# Patient Record
Sex: FEMALE | Race: WHITE | Employment: UNEMPLOYED | URBAN - METROPOLITAN AREA
[De-identification: names, ages, dates, MRNs, and addresses within clinical notes are randomized per-mention and may not be internally consistent; named-entity substitution may affect disease eponyms.]

---

## 2018-06-01 ENCOUNTER — HOSPITAL ENCOUNTER (OUTPATIENT)
Dept: LAB | Age: 68
Discharge: HOME | End: 2018-06-01
Payer: MEDICARE

## 2018-06-01 DIAGNOSIS — D75.1 POLYCYTHEMIA: ICD-10-CM

## 2018-06-01 LAB
ALBUMIN SERPL-MCNC: 4.2 G/DL (ref 3.2–4.6)
ALBUMIN/GLOB SERPL: 1.1 {RATIO} (ref 1.2–3.5)
ALP SERPL-CCNC: 89 U/L (ref 50–136)
ALT SERPL-CCNC: 17 U/L (ref 12–65)
ANION GAP SERPL CALC-SCNC: 4 MMOL/L (ref 7–16)
APPEARANCE UR: CLEAR
AST SERPL-CCNC: 12 U/L (ref 15–37)
BACTERIA URNS QL MICRO: NORMAL /HPF
BASOPHILS # BLD: 0.1 K/UL (ref 0–0.2)
BASOPHILS NFR BLD: 1 % (ref 0–2)
BILIRUB SERPL-MCNC: 0.4 MG/DL (ref 0.2–1.1)
BILIRUB UR QL: NEGATIVE
BUN SERPL-MCNC: 9 MG/DL (ref 8–23)
CALCIUM SERPL-MCNC: 9.5 MG/DL (ref 8.3–10.4)
CASTS URNS QL MICRO: 0 /LPF
CHLORIDE SERPL-SCNC: 96 MMOL/L (ref 98–107)
CO2 SERPL-SCNC: 32 MMOL/L (ref 21–32)
COLOR UR: YELLOW
CREAT SERPL-MCNC: 0.8 MG/DL (ref 0.6–1)
CRYSTALS URNS QL MICRO: 0 /LPF
DIFFERENTIAL METHOD BLD: ABNORMAL
EOSINOPHIL # BLD: 0.1 K/UL (ref 0–0.8)
EOSINOPHIL NFR BLD: 1 % (ref 0.5–7.8)
EPI CELLS #/AREA URNS HPF: NORMAL /HPF
ERYTHROCYTE [DISTWIDTH] IN BLOOD BY AUTOMATED COUNT: 13.7 % (ref 11.9–14.6)
GLOBULIN SER CALC-MCNC: 3.8 G/DL (ref 2.3–3.5)
GLUCOSE SERPL-MCNC: 102 MG/DL (ref 65–100)
GLUCOSE UR STRIP.AUTO-MCNC: NEGATIVE MG/DL
HCT VFR BLD AUTO: 50.2 % (ref 35.8–46.3)
HGB BLD-MCNC: 16.9 G/DL (ref 11.7–15.4)
HGB UR QL STRIP: ABNORMAL
KETONES UR QL STRIP.AUTO: NEGATIVE MG/DL
LEUKOCYTE ESTERASE UR QL STRIP.AUTO: NEGATIVE
LYMPHOCYTES # BLD: 2 K/UL (ref 0.5–4.6)
LYMPHOCYTES NFR BLD: 26 % (ref 13–44)
Lab: NORMAL
MCH RBC QN AUTO: 29.9 PG (ref 26.1–32.9)
MCHC RBC AUTO-ENTMCNC: 33.7 G/DL (ref 31.4–35)
MCV RBC AUTO: 88.7 FL (ref 79.6–97.8)
MONOCYTES # BLD: 0.8 K/UL (ref 0.1–1.3)
MONOCYTES NFR BLD: 10 % (ref 4–12)
MUCOUS THREADS URNS QL MICRO: 0 /LPF
NEUTS SEG # BLD: 4.8 K/UL (ref 1.7–8.2)
NEUTS SEG NFR BLD: 63 % (ref 43–78)
NITRITE UR QL STRIP.AUTO: NEGATIVE
NRBC # BLD: 0 K/UL (ref 0–0.2)
NRBC BLD-RTO: 0 PER 100 WBC (ref 0–2)
PH UR STRIP: 6 [PH] (ref 5–9)
PLATELET # BLD AUTO: 282 K/UL (ref 150–450)
PMV BLD AUTO: 8.8 FL (ref 10.8–14.1)
POTASSIUM SERPL-SCNC: 3.4 MMOL/L (ref 3.5–5.1)
PROT SERPL-MCNC: 8 G/DL (ref 6.3–8.2)
PROT UR STRIP-MCNC: NEGATIVE MG/DL
RBC # BLD AUTO: 5.66 M/UL (ref 4.05–5.25)
RBC #/AREA URNS HPF: NORMAL /HPF
REFERENCE LAB,REFLB: NORMAL
SODIUM SERPL-SCNC: 132 MMOL/L (ref 136–145)
SP GR UR REFRACTOMETRY: 1.01 (ref 1–1.02)
TEST DESCRIPTION:,ATST: NORMAL
UROBILINOGEN UR QL STRIP.AUTO: 0.2 EU/DL (ref 0.2–1)
WBC # BLD AUTO: 7.6 K/UL (ref 4.3–11.1)
WBC URNS QL MICRO: NORMAL /HPF

## 2018-06-01 PROCEDURE — 82375 ASSAY CARBOXYHB QUANT: CPT | Performed by: INTERNAL MEDICINE

## 2018-06-01 PROCEDURE — 81003 URINALYSIS AUTO W/O SCOPE: CPT | Performed by: INTERNAL MEDICINE

## 2018-06-01 PROCEDURE — 82668 ASSAY OF ERYTHROPOIETIN: CPT | Performed by: INTERNAL MEDICINE

## 2018-06-01 PROCEDURE — 80053 COMPREHEN METABOLIC PANEL: CPT | Performed by: INTERNAL MEDICINE

## 2018-06-01 PROCEDURE — 85025 COMPLETE CBC W/AUTO DIFF WBC: CPT | Performed by: INTERNAL MEDICINE

## 2018-06-01 PROCEDURE — 36415 COLL VENOUS BLD VENIPUNCTURE: CPT | Performed by: INTERNAL MEDICINE

## 2018-06-01 PROCEDURE — 81015 MICROSCOPIC EXAM OF URINE: CPT | Performed by: INTERNAL MEDICINE

## 2018-06-02 LAB — EPO SERPL-ACNC: 4.3 MIU/ML (ref 2.6–18.5)

## 2018-06-04 LAB — COHGB MFR BLD: 10.5 % (ref 0–3.6)

## 2018-06-06 LAB — PATH REV BLD -IMP: NORMAL

## 2018-06-10 PROBLEM — I10 ESSENTIAL HYPERTENSION: Status: ACTIVE | Noted: 2018-06-10

## 2018-06-10 PROBLEM — R53.82 CHRONIC FATIGUE: Status: ACTIVE | Noted: 2018-06-10

## 2018-06-10 PROBLEM — F17.200 CURRENT SMOKER: Status: ACTIVE | Noted: 2018-06-10

## 2018-06-10 PROBLEM — K21.9 GERD (GASTROESOPHAGEAL REFLUX DISEASE): Status: ACTIVE | Noted: 2018-06-10

## 2018-06-10 PROBLEM — J44.9 COPD (CHRONIC OBSTRUCTIVE PULMONARY DISEASE) WITH CHRONIC BRONCHITIS (HCC): Status: ACTIVE | Noted: 2018-06-10

## 2018-06-10 PROBLEM — D75.1 POLYCYTHEMIA: Status: ACTIVE | Noted: 2018-06-10

## 2018-06-10 PROBLEM — F32.9 MAJOR DEPRESSIVE DISORDER: Status: ACTIVE | Noted: 2018-06-10

## 2018-06-10 PROBLEM — E66.09 CLASS 1 OBESITY DUE TO EXCESS CALORIES IN ADULT: Status: ACTIVE | Noted: 2018-06-10

## 2018-06-10 PROBLEM — Z80.3 FAMILY HISTORY OF BREAST CANCER: Status: ACTIVE | Noted: 2018-06-10

## 2018-07-02 ENCOUNTER — HOSPITAL ENCOUNTER (OUTPATIENT)
Dept: LAB | Age: 68
Discharge: HOME | End: 2018-07-02
Payer: MEDICARE

## 2018-07-02 DIAGNOSIS — D75.1 POLYCYTHEMIA: ICD-10-CM

## 2018-07-02 LAB
ALBUMIN SERPL-MCNC: 3.9 G/DL (ref 3.2–4.6)
ALBUMIN/GLOB SERPL: 1.1 {RATIO} (ref 1.2–3.5)
ALP SERPL-CCNC: 71 U/L (ref 50–136)
ALT SERPL-CCNC: 16 U/L (ref 12–65)
ANION GAP SERPL CALC-SCNC: 6 MMOL/L (ref 7–16)
AST SERPL-CCNC: 13 U/L (ref 15–37)
BASOPHILS # BLD: 0 K/UL (ref 0–0.2)
BASOPHILS NFR BLD: 1 % (ref 0–2)
BILIRUB SERPL-MCNC: 0.3 MG/DL (ref 0.2–1.1)
BUN SERPL-MCNC: 8 MG/DL (ref 8–23)
CALCIUM SERPL-MCNC: 9.4 MG/DL (ref 8.3–10.4)
CHLORIDE SERPL-SCNC: 103 MMOL/L (ref 98–107)
CO2 SERPL-SCNC: 27 MMOL/L (ref 21–32)
CREAT SERPL-MCNC: 0.8 MG/DL (ref 0.6–1)
DIFFERENTIAL METHOD BLD: ABNORMAL
EOSINOPHIL # BLD: 0.1 K/UL (ref 0–0.8)
EOSINOPHIL NFR BLD: 1 % (ref 0.5–7.8)
ERYTHROCYTE [DISTWIDTH] IN BLOOD BY AUTOMATED COUNT: 13.3 % (ref 11.9–14.6)
GLOBULIN SER CALC-MCNC: 3.4 G/DL (ref 2.3–3.5)
GLUCOSE SERPL-MCNC: 95 MG/DL (ref 65–100)
HCT VFR BLD AUTO: 47 % (ref 35.8–46.3)
HGB BLD-MCNC: 15.9 G/DL (ref 11.7–15.4)
LYMPHOCYTES # BLD: 1.7 K/UL (ref 0.5–4.6)
LYMPHOCYTES NFR BLD: 27 % (ref 13–44)
MCH RBC QN AUTO: 29.9 PG (ref 26.1–32.9)
MCHC RBC AUTO-ENTMCNC: 33.8 G/DL (ref 31.4–35)
MCV RBC AUTO: 88.3 FL (ref 79.6–97.8)
MONOCYTES # BLD: 0.8 K/UL (ref 0.1–1.3)
MONOCYTES NFR BLD: 13 % (ref 4–12)
NEUTS SEG # BLD: 3.7 K/UL (ref 1.7–8.2)
NEUTS SEG NFR BLD: 58 % (ref 43–78)
NRBC # BLD: 0 K/UL (ref 0–0.2)
PLATELET # BLD AUTO: 270 K/UL (ref 150–450)
PMV BLD AUTO: 8.7 FL (ref 10.8–14.1)
POTASSIUM SERPL-SCNC: 4.1 MMOL/L (ref 3.5–5.1)
PROT SERPL-MCNC: 7.3 G/DL (ref 6.3–8.2)
RBC # BLD AUTO: 5.32 M/UL (ref 4.05–5.25)
SODIUM SERPL-SCNC: 136 MMOL/L (ref 136–145)
WBC # BLD AUTO: 6.3 K/UL (ref 4.3–11.1)

## 2018-07-02 PROCEDURE — 36415 COLL VENOUS BLD VENIPUNCTURE: CPT | Performed by: INTERNAL MEDICINE

## 2018-07-02 PROCEDURE — 80053 COMPREHEN METABOLIC PANEL: CPT | Performed by: INTERNAL MEDICINE

## 2018-07-02 PROCEDURE — 85025 COMPLETE CBC W/AUTO DIFF WBC: CPT | Performed by: INTERNAL MEDICINE

## 2018-08-06 ENCOUNTER — HOSPITAL ENCOUNTER (OUTPATIENT)
Dept: LAB | Age: 68
Discharge: HOME | End: 2018-08-06
Payer: MEDICARE

## 2018-08-06 DIAGNOSIS — D75.1 POLYCYTHEMIA: ICD-10-CM

## 2018-08-06 LAB
ALBUMIN SERPL-MCNC: 3.9 G/DL (ref 3.2–4.6)
ALBUMIN/GLOB SERPL: 1.2 {RATIO} (ref 1.2–3.5)
ALP SERPL-CCNC: 74 U/L (ref 50–136)
ALT SERPL-CCNC: 18 U/L (ref 12–65)
ANION GAP SERPL CALC-SCNC: 6 MMOL/L (ref 7–16)
AST SERPL-CCNC: 9 U/L (ref 15–37)
BASOPHILS # BLD: 0 K/UL (ref 0–0.2)
BASOPHILS NFR BLD: 0 % (ref 0–2)
BILIRUB SERPL-MCNC: 0.7 MG/DL (ref 0.2–1.1)
BUN SERPL-MCNC: 10 MG/DL (ref 8–23)
CALCIUM SERPL-MCNC: 9.1 MG/DL (ref 8.3–10.4)
CHLORIDE SERPL-SCNC: 101 MMOL/L (ref 98–107)
CO2 SERPL-SCNC: 28 MMOL/L (ref 21–32)
CREAT SERPL-MCNC: 0.86 MG/DL (ref 0.6–1)
DIFFERENTIAL METHOD BLD: ABNORMAL
EOSINOPHIL # BLD: 0.1 K/UL (ref 0–0.8)
EOSINOPHIL NFR BLD: 1 % (ref 0.5–7.8)
ERYTHROCYTE [DISTWIDTH] IN BLOOD BY AUTOMATED COUNT: 13.6 % (ref 11.9–14.6)
GLOBULIN SER CALC-MCNC: 3.3 G/DL (ref 2.3–3.5)
GLUCOSE SERPL-MCNC: 91 MG/DL (ref 65–100)
HCT VFR BLD AUTO: 46.8 % (ref 35.8–46.3)
HGB BLD-MCNC: 15.5 G/DL (ref 11.7–15.4)
LYMPHOCYTES # BLD: 1.8 K/UL (ref 0.5–4.6)
LYMPHOCYTES NFR BLD: 27 % (ref 13–44)
MCH RBC QN AUTO: 29 PG (ref 26.1–32.9)
MCHC RBC AUTO-ENTMCNC: 33.1 G/DL (ref 31.4–35)
MCV RBC AUTO: 87.6 FL (ref 79.6–97.8)
MONOCYTES # BLD: 0.9 K/UL (ref 0.1–1.3)
MONOCYTES NFR BLD: 13 % (ref 4–12)
NEUTS SEG # BLD: 4.1 K/UL (ref 1.7–8.2)
NEUTS SEG NFR BLD: 59 % (ref 43–78)
NRBC # BLD: 0 K/UL (ref 0–0.2)
PLATELET # BLD AUTO: 278 K/UL (ref 150–450)
PMV BLD AUTO: 8.8 FL (ref 9.4–12.3)
POTASSIUM SERPL-SCNC: 4.2 MMOL/L (ref 3.5–5.1)
PROT SERPL-MCNC: 7.2 G/DL (ref 6.3–8.2)
RBC # BLD AUTO: 5.34 M/UL (ref 4.05–5.25)
SODIUM SERPL-SCNC: 135 MMOL/L (ref 136–145)
WBC # BLD AUTO: 6.9 K/UL (ref 4.3–11.1)

## 2018-08-06 PROCEDURE — 80053 COMPREHEN METABOLIC PANEL: CPT

## 2018-08-06 PROCEDURE — 36415 COLL VENOUS BLD VENIPUNCTURE: CPT

## 2018-08-06 PROCEDURE — 85025 COMPLETE CBC W/AUTO DIFF WBC: CPT

## 2018-09-10 ENCOUNTER — HOSPITAL ENCOUNTER (OUTPATIENT)
Dept: LAB | Age: 68
Discharge: HOME | End: 2018-09-10
Payer: MEDICARE

## 2018-09-10 DIAGNOSIS — D75.1 POLYCYTHEMIA: ICD-10-CM

## 2018-09-10 LAB
ALBUMIN SERPL-MCNC: 3.8 G/DL (ref 3.2–4.6)
ALBUMIN/GLOB SERPL: 1 {RATIO} (ref 1.2–3.5)
ALP SERPL-CCNC: 77 U/L (ref 50–136)
ALT SERPL-CCNC: 18 U/L (ref 12–65)
ANION GAP SERPL CALC-SCNC: 5 MMOL/L (ref 7–16)
AST SERPL-CCNC: 13 U/L (ref 15–37)
BASOPHILS # BLD: 0 K/UL (ref 0–0.2)
BASOPHILS NFR BLD: 1 % (ref 0–2)
BILIRUB SERPL-MCNC: 0.4 MG/DL (ref 0.2–1.1)
BUN SERPL-MCNC: 11 MG/DL (ref 8–23)
CALCIUM SERPL-MCNC: 9.4 MG/DL (ref 8.3–10.4)
CHLORIDE SERPL-SCNC: 102 MMOL/L (ref 98–107)
CO2 SERPL-SCNC: 31 MMOL/L (ref 21–32)
CREAT SERPL-MCNC: 0.78 MG/DL (ref 0.6–1)
DIFFERENTIAL METHOD BLD: ABNORMAL
EOSINOPHIL # BLD: 0.1 K/UL (ref 0–0.8)
EOSINOPHIL NFR BLD: 1 % (ref 0.5–7.8)
ERYTHROCYTE [DISTWIDTH] IN BLOOD BY AUTOMATED COUNT: 13.8 % (ref 11.9–14.6)
GLOBULIN SER CALC-MCNC: 3.9 G/DL (ref 2.3–3.5)
GLUCOSE SERPL-MCNC: 81 MG/DL (ref 65–100)
HCT VFR BLD AUTO: 48.9 % (ref 35.8–46.3)
HGB BLD-MCNC: 15.9 G/DL (ref 11.7–15.4)
IMM GRANULOCYTES # BLD: 0 K/UL (ref 0–0.5)
IMM GRANULOCYTES NFR BLD AUTO: 0 % (ref 0–5)
LYMPHOCYTES # BLD: 1.9 K/UL (ref 0.5–4.6)
LYMPHOCYTES NFR BLD: 27 % (ref 13–44)
MCH RBC QN AUTO: 28.8 PG (ref 26.1–32.9)
MCHC RBC AUTO-ENTMCNC: 32.5 G/DL (ref 31.4–35)
MCV RBC AUTO: 88.6 FL (ref 79.6–97.8)
MONOCYTES # BLD: 0.9 K/UL (ref 0.1–1.3)
MONOCYTES NFR BLD: 12 % (ref 4–12)
NEUTS SEG # BLD: 4.2 K/UL (ref 1.7–8.2)
NEUTS SEG NFR BLD: 59 % (ref 43–78)
NRBC # BLD: 0 K/UL (ref 0–0.2)
PLATELET # BLD AUTO: 309 K/UL (ref 150–450)
PMV BLD AUTO: 8.7 FL (ref 9.4–12.3)
POTASSIUM SERPL-SCNC: 4.1 MMOL/L (ref 3.5–5.1)
PROT SERPL-MCNC: 7.7 G/DL (ref 6.3–8.2)
RBC # BLD AUTO: 5.52 M/UL (ref 4.05–5.25)
SODIUM SERPL-SCNC: 138 MMOL/L (ref 136–145)
WBC # BLD AUTO: 7.2 K/UL (ref 4.3–11.1)

## 2018-09-10 PROCEDURE — 85025 COMPLETE CBC W/AUTO DIFF WBC: CPT

## 2018-09-10 PROCEDURE — 80053 COMPREHEN METABOLIC PANEL: CPT

## 2018-09-10 PROCEDURE — 36415 COLL VENOUS BLD VENIPUNCTURE: CPT

## 2018-11-26 ENCOUNTER — HOSPITAL ENCOUNTER (OUTPATIENT)
Dept: LAB | Age: 68
Discharge: HOME | End: 2018-11-26
Payer: MEDICARE

## 2018-11-26 DIAGNOSIS — D58.2 ELEVATED HEMOGLOBIN (HCC): ICD-10-CM

## 2018-11-26 LAB
ALBUMIN SERPL-MCNC: 4 G/DL (ref 3.2–4.6)
ALBUMIN/GLOB SERPL: 1 {RATIO} (ref 1.2–3.5)
ALP SERPL-CCNC: 76 U/L (ref 50–136)
ALT SERPL-CCNC: 23 U/L (ref 12–65)
ANION GAP SERPL CALC-SCNC: 3 MMOL/L (ref 7–16)
AST SERPL-CCNC: 17 U/L (ref 15–37)
BASOPHILS # BLD: 0 K/UL (ref 0–0.2)
BASOPHILS NFR BLD: 1 % (ref 0–2)
BILIRUB SERPL-MCNC: 0.3 MG/DL (ref 0.2–1.1)
BUN SERPL-MCNC: 11 MG/DL (ref 8–23)
CALCIUM SERPL-MCNC: 9.5 MG/DL (ref 8.3–10.4)
CHLORIDE SERPL-SCNC: 102 MMOL/L (ref 98–107)
CO2 SERPL-SCNC: 32 MMOL/L (ref 21–32)
CREAT SERPL-MCNC: 0.91 MG/DL (ref 0.6–1)
DIFFERENTIAL METHOD BLD: ABNORMAL
EOSINOPHIL # BLD: 0.1 K/UL (ref 0–0.8)
EOSINOPHIL NFR BLD: 2 % (ref 0.5–7.8)
ERYTHROCYTE [DISTWIDTH] IN BLOOD BY AUTOMATED COUNT: 13.4 % (ref 11.9–14.6)
GLOBULIN SER CALC-MCNC: 3.9 G/DL (ref 2.3–3.5)
GLUCOSE SERPL-MCNC: 98 MG/DL (ref 65–100)
HCT VFR BLD AUTO: 49.4 % (ref 35.8–46.3)
HGB BLD-MCNC: 15.8 G/DL (ref 11.7–15.4)
IMM GRANULOCYTES # BLD: 0 K/UL (ref 0–0.5)
IMM GRANULOCYTES NFR BLD AUTO: 0 % (ref 0–5)
LYMPHOCYTES # BLD: 2 K/UL (ref 0.5–4.6)
LYMPHOCYTES NFR BLD: 32 % (ref 13–44)
MCH RBC QN AUTO: 28.8 PG (ref 26.1–32.9)
MCHC RBC AUTO-ENTMCNC: 32 G/DL (ref 31.4–35)
MCV RBC AUTO: 90 FL (ref 79.6–97.8)
MONOCYTES # BLD: 0.7 K/UL (ref 0.1–1.3)
MONOCYTES NFR BLD: 11 % (ref 4–12)
NEUTS SEG # BLD: 3.4 K/UL (ref 1.7–8.2)
NEUTS SEG NFR BLD: 55 % (ref 43–78)
NRBC # BLD: 0 K/UL (ref 0–0.2)
PLATELET # BLD AUTO: 303 K/UL (ref 150–450)
PMV BLD AUTO: 8.7 FL (ref 9.4–12.3)
POTASSIUM SERPL-SCNC: 4.5 MMOL/L (ref 3.5–5.1)
PROT SERPL-MCNC: 7.9 G/DL (ref 6.3–8.2)
RBC # BLD AUTO: 5.49 M/UL (ref 4.05–5.25)
SODIUM SERPL-SCNC: 137 MMOL/L (ref 136–145)
WBC # BLD AUTO: 6.3 K/UL (ref 4.3–11.1)

## 2018-11-26 PROCEDURE — 36415 COLL VENOUS BLD VENIPUNCTURE: CPT

## 2018-11-26 PROCEDURE — 80053 COMPREHEN METABOLIC PANEL: CPT

## 2018-11-26 PROCEDURE — 85025 COMPLETE CBC W/AUTO DIFF WBC: CPT

## 2019-03-04 ENCOUNTER — HOSPITAL ENCOUNTER (OUTPATIENT)
Dept: LAB | Age: 69
Discharge: HOME | End: 2019-03-04
Payer: MEDICARE

## 2019-03-04 DIAGNOSIS — D75.1 POLYCYTHEMIA: ICD-10-CM

## 2019-03-04 LAB
ALBUMIN SERPL-MCNC: 3.8 G/DL (ref 3.2–4.6)
ALBUMIN/GLOB SERPL: 1 {RATIO} (ref 1.2–3.5)
ALP SERPL-CCNC: 80 U/L (ref 50–136)
ALT SERPL-CCNC: 20 U/L (ref 12–65)
ANION GAP SERPL CALC-SCNC: 2 MMOL/L (ref 7–16)
AST SERPL-CCNC: 12 U/L (ref 15–37)
BASOPHILS # BLD: 0 K/UL (ref 0–0.2)
BASOPHILS NFR BLD: 1 % (ref 0–2)
BILIRUB SERPL-MCNC: 0.3 MG/DL (ref 0.2–1.1)
BUN SERPL-MCNC: 13 MG/DL (ref 8–23)
CALCIUM SERPL-MCNC: 9.1 MG/DL (ref 8.3–10.4)
CHLORIDE SERPL-SCNC: 102 MMOL/L (ref 98–107)
CO2 SERPL-SCNC: 33 MMOL/L (ref 21–32)
CREAT SERPL-MCNC: 1 MG/DL (ref 0.6–1)
DIFFERENTIAL METHOD BLD: ABNORMAL
EOSINOPHIL # BLD: 0.1 K/UL (ref 0–0.8)
EOSINOPHIL NFR BLD: 2 % (ref 0.5–7.8)
ERYTHROCYTE [DISTWIDTH] IN BLOOD BY AUTOMATED COUNT: 13.3 % (ref 11.9–14.6)
GLOBULIN SER CALC-MCNC: 4 G/DL (ref 2.3–3.5)
GLUCOSE SERPL-MCNC: 94 MG/DL (ref 65–100)
HCT VFR BLD AUTO: 45.2 % (ref 35.8–46.3)
HGB BLD-MCNC: 14.9 G/DL (ref 11.7–15.4)
IMM GRANULOCYTES # BLD AUTO: 0.1 K/UL (ref 0–0.5)
IMM GRANULOCYTES NFR BLD AUTO: 1 % (ref 0–5)
LYMPHOCYTES # BLD: 1.8 K/UL (ref 0.5–4.6)
LYMPHOCYTES NFR BLD: 23 % (ref 13–44)
MCH RBC QN AUTO: 29.2 PG (ref 26.1–32.9)
MCHC RBC AUTO-ENTMCNC: 33 G/DL (ref 31.4–35)
MCV RBC AUTO: 88.5 FL (ref 79.6–97.8)
MONOCYTES # BLD: 0.9 K/UL (ref 0.1–1.3)
MONOCYTES NFR BLD: 12 % (ref 4–12)
NEUTS SEG # BLD: 4.6 K/UL (ref 1.7–8.2)
NEUTS SEG NFR BLD: 62 % (ref 43–78)
NRBC # BLD: 0 K/UL (ref 0–0.2)
PLATELET # BLD AUTO: 295 K/UL (ref 150–450)
PMV BLD AUTO: 9 FL (ref 9.4–12.3)
POTASSIUM SERPL-SCNC: 3.8 MMOL/L (ref 3.5–5.1)
PROT SERPL-MCNC: 7.8 G/DL (ref 6.3–8.2)
RBC # BLD AUTO: 5.11 M/UL (ref 4.05–5.25)
SODIUM SERPL-SCNC: 137 MMOL/L (ref 136–145)
WBC # BLD AUTO: 7.5 K/UL (ref 4.3–11.1)

## 2019-03-04 PROCEDURE — 80053 COMPREHEN METABOLIC PANEL: CPT

## 2019-03-04 PROCEDURE — 36415 COLL VENOUS BLD VENIPUNCTURE: CPT

## 2019-03-04 PROCEDURE — 85025 COMPLETE CBC W/AUTO DIFF WBC: CPT

## 2019-04-09 ENCOUNTER — HOSPITAL ENCOUNTER (OUTPATIENT)
Dept: ULTRASOUND IMAGING | Age: 69
Discharge: HOME | End: 2019-04-09
Attending: NURSE PRACTITIONER
Payer: MEDICARE

## 2019-04-09 DIAGNOSIS — R10.9 LEFT SIDED ABDOMINAL PAIN OF UNKNOWN CAUSE: ICD-10-CM

## 2019-04-09 PROCEDURE — 76705 ECHO EXAM OF ABDOMEN: CPT

## 2019-05-28 ENCOUNTER — HOSPITAL ENCOUNTER (OUTPATIENT)
Dept: LAB | Age: 69
Discharge: HOME | End: 2019-05-28

## 2019-05-28 PROCEDURE — 88312 SPECIAL STAINS GROUP 1: CPT

## 2019-05-28 PROCEDURE — 88305 TISSUE EXAM BY PATHOLOGIST: CPT

## 2019-06-30 ENCOUNTER — HOSPITAL ENCOUNTER (INPATIENT)
Age: 69
LOS: 3 days | Discharge: HOME | DRG: 189 | End: 2019-07-03
Attending: EMERGENCY MEDICINE | Admitting: INTERNAL MEDICINE
Payer: MEDICARE

## 2019-06-30 ENCOUNTER — APPOINTMENT (OUTPATIENT)
Dept: GENERAL RADIOLOGY | Age: 69
DRG: 189 | End: 2019-06-30
Attending: EMERGENCY MEDICINE
Payer: MEDICARE

## 2019-06-30 DIAGNOSIS — J96.01 ACUTE RESPIRATORY FAILURE WITH HYPOXIA (HCC): ICD-10-CM

## 2019-06-30 DIAGNOSIS — D75.1 POLYCYTHEMIA: ICD-10-CM

## 2019-06-30 DIAGNOSIS — E66.9 OBESITY, UNSPECIFIED CLASSIFICATION, UNSPECIFIED OBESITY TYPE, UNSPECIFIED WHETHER SERIOUS COMORBIDITY PRESENT: Chronic | ICD-10-CM

## 2019-06-30 DIAGNOSIS — J44.1 COPD EXACERBATION (HCC): ICD-10-CM

## 2019-06-30 DIAGNOSIS — Z72.0 TOBACCO USE: Chronic | ICD-10-CM

## 2019-06-30 LAB
ALBUMIN SERPL-MCNC: 3.6 G/DL (ref 3.2–4.6)
ALBUMIN/GLOB SERPL: 1 {RATIO} (ref 1.2–3.5)
ALP SERPL-CCNC: 64 U/L (ref 50–136)
ALT SERPL-CCNC: 20 U/L (ref 12–65)
ANION GAP SERPL CALC-SCNC: 6 MMOL/L (ref 7–16)
ARTERIAL PATENCY WRIST A: YES
AST SERPL-CCNC: 14 U/L (ref 15–37)
ATRIAL RATE: 83 BPM
BASE EXCESS BLD CALC-SCNC: 0 MMOL/L
BASOPHILS # BLD: 0 K/UL (ref 0–0.2)
BASOPHILS NFR BLD: 0 % (ref 0–2)
BDY SITE: ABNORMAL
BILIRUB SERPL-MCNC: 0.4 MG/DL (ref 0.2–1.1)
BODY TEMPERATURE: 98.6
BUN SERPL-MCNC: 6 MG/DL (ref 8–23)
CALCIUM SERPL-MCNC: 9.1 MG/DL (ref 8.3–10.4)
CALCULATED P AXIS, ECG09: 84 DEGREES
CALCULATED R AXIS, ECG10: 89 DEGREES
CALCULATED T AXIS, ECG11: 70 DEGREES
CHLORIDE SERPL-SCNC: 102 MMOL/L (ref 98–107)
CO2 BLD-SCNC: 28 MMOL/L
CO2 SERPL-SCNC: 30 MMOL/L (ref 21–32)
COLLECT TIME,HTIME: 1132
CREAT SERPL-MCNC: 0.71 MG/DL (ref 0.6–1)
DIAGNOSIS, 93000: NORMAL
DIFFERENTIAL METHOD BLD: ABNORMAL
EOSINOPHIL # BLD: 0 K/UL (ref 0–0.8)
EOSINOPHIL NFR BLD: 0 % (ref 0.5–7.8)
ERYTHROCYTE [DISTWIDTH] IN BLOOD BY AUTOMATED COUNT: 13.2 % (ref 11.9–14.6)
FLOW RATE ISTAT,IFRATE: 4 L/MIN
GAS FLOW.O2 O2 DELIVERY SYS: ABNORMAL L/MIN
GLOBULIN SER CALC-MCNC: 3.6 G/DL (ref 2.3–3.5)
GLUCOSE SERPL-MCNC: 104 MG/DL (ref 65–100)
HCO3 BLD-SCNC: 26.9 MMOL/L (ref 22–26)
HCT VFR BLD AUTO: 45 % (ref 35.8–46.3)
HGB BLD-MCNC: 14.5 G/DL (ref 11.7–15.4)
IMM GRANULOCYTES # BLD AUTO: 0 K/UL (ref 0–0.5)
IMM GRANULOCYTES NFR BLD AUTO: 0 % (ref 0–5)
LIPASE SERPL-CCNC: 139 U/L (ref 73–393)
LYMPHOCYTES # BLD: 1.1 K/UL (ref 0.5–4.6)
LYMPHOCYTES NFR BLD: 18 % (ref 13–44)
MCH RBC QN AUTO: 28.9 PG (ref 26.1–32.9)
MCHC RBC AUTO-ENTMCNC: 32.2 G/DL (ref 31.4–35)
MCV RBC AUTO: 89.6 FL (ref 79.6–97.8)
MONOCYTES # BLD: 0.7 K/UL (ref 0.1–1.3)
MONOCYTES NFR BLD: 11 % (ref 4–12)
NEUTS SEG # BLD: 4.4 K/UL (ref 1.7–8.2)
NEUTS SEG NFR BLD: 71 % (ref 43–78)
NRBC # BLD: 0 K/UL (ref 0–0.2)
P-R INTERVAL, ECG05: 164 MS
PCO2 BLD: 48.7 MMHG (ref 35–45)
PH BLD: 7.35 [PH] (ref 7.35–7.45)
PLATELET # BLD AUTO: 246 K/UL (ref 150–450)
PMV BLD AUTO: 9.1 FL (ref 9.4–12.3)
PO2 BLD: 100 MMHG (ref 75–100)
POTASSIUM SERPL-SCNC: 4.2 MMOL/L (ref 3.5–5.1)
PROT SERPL-MCNC: 7.2 G/DL (ref 6.3–8.2)
Q-T INTERVAL, ECG07: 352 MS
QRS DURATION, ECG06: 60 MS
QTC CALCULATION (BEZET), ECG08: 413 MS
RBC # BLD AUTO: 5.02 M/UL (ref 4.05–5.2)
SAO2 % BLD: 97 % (ref 95–98)
SERVICE CMNT-IMP: ABNORMAL
SODIUM SERPL-SCNC: 138 MMOL/L (ref 136–145)
SPECIMEN TYPE: ABNORMAL
VENTRICULAR RATE, ECG03: 83 BPM
WBC # BLD AUTO: 6.2 K/UL (ref 4.3–11.1)

## 2019-06-30 PROCEDURE — 74011250636 HC RX REV CODE- 250/636: Performed by: INTERNAL MEDICINE

## 2019-06-30 PROCEDURE — 71046 X-RAY EXAM CHEST 2 VIEWS: CPT

## 2019-06-30 PROCEDURE — 36600 WITHDRAWAL OF ARTERIAL BLOOD: CPT

## 2019-06-30 PROCEDURE — 74011250636 HC RX REV CODE- 250/636: Performed by: EMERGENCY MEDICINE

## 2019-06-30 PROCEDURE — 77010033678 HC OXYGEN DAILY

## 2019-06-30 PROCEDURE — 96375 TX/PRO/DX INJ NEW DRUG ADDON: CPT | Performed by: EMERGENCY MEDICINE

## 2019-06-30 PROCEDURE — 94760 N-INVAS EAR/PLS OXIMETRY 1: CPT

## 2019-06-30 PROCEDURE — 96374 THER/PROPH/DIAG INJ IV PUSH: CPT | Performed by: EMERGENCY MEDICINE

## 2019-06-30 PROCEDURE — 77030013140 HC MSK NEB VYRM -A

## 2019-06-30 PROCEDURE — 99285 EMERGENCY DEPT VISIT HI MDM: CPT | Performed by: EMERGENCY MEDICINE

## 2019-06-30 PROCEDURE — 74011250637 HC RX REV CODE- 250/637: Performed by: INTERNAL MEDICINE

## 2019-06-30 PROCEDURE — 80053 COMPREHEN METABOLIC PANEL: CPT

## 2019-06-30 PROCEDURE — 65270000029 HC RM PRIVATE

## 2019-06-30 PROCEDURE — 82803 BLOOD GASES ANY COMBINATION: CPT

## 2019-06-30 PROCEDURE — 83690 ASSAY OF LIPASE: CPT

## 2019-06-30 PROCEDURE — 94640 AIRWAY INHALATION TREATMENT: CPT

## 2019-06-30 PROCEDURE — 93005 ELECTROCARDIOGRAM TRACING: CPT | Performed by: EMERGENCY MEDICINE

## 2019-06-30 PROCEDURE — 85025 COMPLETE CBC W/AUTO DIFF WBC: CPT

## 2019-06-30 PROCEDURE — 74011000250 HC RX REV CODE- 250: Performed by: INTERNAL MEDICINE

## 2019-06-30 PROCEDURE — 74011000250 HC RX REV CODE- 250: Performed by: EMERGENCY MEDICINE

## 2019-06-30 PROCEDURE — 94644 CONT INHLJ TX 1ST HOUR: CPT

## 2019-06-30 RX ORDER — LAMOTRIGINE 100 MG/1
100 TABLET ORAL 2 TIMES DAILY
Status: DISCONTINUED | OUTPATIENT
Start: 2019-06-30 | End: 2019-06-30

## 2019-06-30 RX ORDER — CITALOPRAM 20 MG/1
10 TABLET, FILM COATED ORAL DAILY
Status: DISCONTINUED | OUTPATIENT
Start: 2019-06-30 | End: 2019-07-01

## 2019-06-30 RX ORDER — HEPARIN SODIUM 5000 [USP'U]/ML
5000 INJECTION, SOLUTION INTRAVENOUS; SUBCUTANEOUS EVERY 8 HOURS
Status: DISCONTINUED | OUTPATIENT
Start: 2019-06-30 | End: 2019-07-03 | Stop reason: HOSPADM

## 2019-06-30 RX ORDER — ACETAMINOPHEN 325 MG/1
650 TABLET ORAL
Status: DISCONTINUED | OUTPATIENT
Start: 2019-06-30 | End: 2019-07-03 | Stop reason: HOSPADM

## 2019-06-30 RX ORDER — BUDESONIDE 0.5 MG/2ML
500 INHALANT ORAL
Status: DISCONTINUED | OUTPATIENT
Start: 2019-06-30 | End: 2019-07-03 | Stop reason: HOSPADM

## 2019-06-30 RX ORDER — ALBUTEROL SULFATE 0.83 MG/ML
10 SOLUTION RESPIRATORY (INHALATION)
Status: COMPLETED | OUTPATIENT
Start: 2019-06-30 | End: 2019-06-30

## 2019-06-30 RX ORDER — FAMOTIDINE 20 MG/1
20 TABLET, FILM COATED ORAL 2 TIMES DAILY
Status: DISCONTINUED | OUTPATIENT
Start: 2019-06-30 | End: 2019-07-03 | Stop reason: HOSPADM

## 2019-06-30 RX ORDER — BUPROPION HYDROCHLORIDE 150 MG/1
150 TABLET, EXTENDED RELEASE ORAL 2 TIMES DAILY
Status: DISCONTINUED | OUTPATIENT
Start: 2019-06-30 | End: 2019-06-30

## 2019-06-30 RX ORDER — SODIUM CHLORIDE 0.9 % (FLUSH) 0.9 %
5-40 SYRINGE (ML) INJECTION AS NEEDED
Status: DISCONTINUED | OUTPATIENT
Start: 2019-06-30 | End: 2019-07-03 | Stop reason: HOSPADM

## 2019-06-30 RX ORDER — SODIUM CHLORIDE 0.9 % (FLUSH) 0.9 %
5-40 SYRINGE (ML) INJECTION EVERY 8 HOURS
Status: DISCONTINUED | OUTPATIENT
Start: 2019-06-30 | End: 2019-07-03 | Stop reason: HOSPADM

## 2019-06-30 RX ORDER — HYDROCHLOROTHIAZIDE 25 MG/1
25 TABLET ORAL DAILY
Status: DISCONTINUED | OUTPATIENT
Start: 2019-07-01 | End: 2019-06-30

## 2019-06-30 RX ORDER — ALBUTEROL SULFATE 0.83 MG/ML
2.5 SOLUTION RESPIRATORY (INHALATION)
Status: DISCONTINUED | OUTPATIENT
Start: 2019-06-30 | End: 2019-07-01

## 2019-06-30 RX ORDER — ASPIRIN 81 MG/1
81 TABLET ORAL DAILY
Status: DISCONTINUED | OUTPATIENT
Start: 2019-07-01 | End: 2019-07-03 | Stop reason: HOSPADM

## 2019-06-30 RX ORDER — ONDANSETRON 2 MG/ML
4 INJECTION INTRAMUSCULAR; INTRAVENOUS
Status: DISCONTINUED | OUTPATIENT
Start: 2019-06-30 | End: 2019-07-03 | Stop reason: HOSPADM

## 2019-06-30 RX ORDER — HYDROCODONE BITARTRATE AND ACETAMINOPHEN 5; 325 MG/1; MG/1
1 TABLET ORAL
Status: DISCONTINUED | OUTPATIENT
Start: 2019-06-30 | End: 2019-07-03 | Stop reason: HOSPADM

## 2019-06-30 RX ORDER — CITALOPRAM 20 MG/1
10 TABLET, FILM COATED ORAL DAILY
Status: DISCONTINUED | OUTPATIENT
Start: 2019-07-01 | End: 2019-06-30 | Stop reason: SDUPTHER

## 2019-06-30 RX ORDER — ONDANSETRON 2 MG/ML
4 INJECTION INTRAMUSCULAR; INTRAVENOUS
Status: COMPLETED | OUTPATIENT
Start: 2019-06-30 | End: 2019-06-30

## 2019-06-30 RX ADMIN — METHYLPREDNISOLONE SODIUM SUCCINATE 60 MG: 125 INJECTION, POWDER, FOR SOLUTION INTRAMUSCULAR; INTRAVENOUS at 17:33

## 2019-06-30 RX ADMIN — Medication 10 ML: at 15:33

## 2019-06-30 RX ADMIN — BUDESONIDE 500 MCG: 0.5 INHALANT RESPIRATORY (INHALATION) at 16:25

## 2019-06-30 RX ADMIN — HEPARIN SODIUM 5000 UNITS: 5000 INJECTION INTRAVENOUS; SUBCUTANEOUS at 15:42

## 2019-06-30 RX ADMIN — HEPARIN SODIUM 5000 UNITS: 5000 INJECTION INTRAVENOUS; SUBCUTANEOUS at 22:22

## 2019-06-30 RX ADMIN — BUDESONIDE 500 MCG: 0.5 INHALANT RESPIRATORY (INHALATION) at 19:06

## 2019-06-30 RX ADMIN — FAMOTIDINE 20 MG: 20 TABLET ORAL at 17:33

## 2019-06-30 RX ADMIN — ALBUTEROL SULFATE 2.5 MG: 2.5 SOLUTION RESPIRATORY (INHALATION) at 16:25

## 2019-06-30 RX ADMIN — CITALOPRAM HYDROBROMIDE 10 MG: 20 TABLET ORAL at 23:12

## 2019-06-30 RX ADMIN — METHYLPREDNISOLONE SODIUM SUCCINATE 60 MG: 125 INJECTION, POWDER, FOR SOLUTION INTRAMUSCULAR; INTRAVENOUS at 23:13

## 2019-06-30 RX ADMIN — ALBUTEROL SULFATE 2.5 MG: 2.5 SOLUTION RESPIRATORY (INHALATION) at 19:06

## 2019-06-30 RX ADMIN — ALBUTEROL SULFATE 10 MG: 2.5 SOLUTION RESPIRATORY (INHALATION) at 11:25

## 2019-06-30 RX ADMIN — Medication 5 ML: at 22:22

## 2019-06-30 RX ADMIN — METHYLPREDNISOLONE SODIUM SUCCINATE 125 MG: 125 INJECTION, POWDER, FOR SOLUTION INTRAMUSCULAR; INTRAVENOUS at 11:31

## 2019-06-30 RX ADMIN — ONDANSETRON 4 MG: 2 INJECTION INTRAMUSCULAR; INTRAVENOUS at 11:30

## 2019-06-30 NOTE — ED PROVIDER NOTES
28-year-old lady presents with concerns about wheezing and shortness of breath that progressed over about 3 weeks. She said about one month ago she was given some steroids and antibiotics for presumed respiratory infection. She says that she has not gotten any better. She has tried some breathing treatments at home this morning without any improvement. She is not normally on oxygen. She denies any chest pressure but says that it is tight when she tries to breathe. She also says that she has had some nausea and she is concerned because she was diagnosed with some gallstones. Elements of this note were created using speech recognition software. As such, errors of speech recognition may be present. Past Medical History:   Diagnosis Date    COPD     Emphysema     Essential hypertension 6/10/2018    GERD (gastroesophageal reflux disease) 6/10/2018    Psychiatric disorder     anxiety, depression       History reviewed. No pertinent surgical history.       Family History:   Problem Relation Age of Onset    Breast Cancer Mother 61    Breast Cancer Sister [facility]    Breast Cancer Maternal Aunt        Social History     Socioeconomic History    Marital status:      Spouse name: Not on file    Number of children: Not on file    Years of education: Not on file    Highest education level: Not on file   Occupational History    Not on file   Social Needs    Financial resource strain: Not on file    Food insecurity:     Worry: Not on file     Inability: Not on file    Transportation needs:     Medical: Not on file     Non-medical: Not on file   Tobacco Use    Smoking status: Former Smoker     Packs/day: 0.25    Smokeless tobacco: Never Used   Substance and Sexual Activity    Alcohol use: No    Drug use: No    Sexual activity: Not on file   Lifestyle    Physical activity:     Days per week: Not on file     Minutes per session: Not on file    Stress: Not on file   Relationships    Social connections:     Talks on phone: Not on file     Gets together: Not on file     Attends Islam service: Not on file     Active member of club or organization: Not on file     Attends meetings of clubs or organizations: Not on file     Relationship status: Not on file    Intimate partner violence:     Fear of current or ex partner: Not on file     Emotionally abused: Not on file     Physically abused: Not on file     Forced sexual activity: Not on file   Other Topics Concern    Not on file   Social History Narrative    Not on file         ALLERGIES: Aspirin; Codeine; Iodine; Nuts [tree nut]; Sodium benzoate; and Sulfa (sulfonamide antibiotics)    Review of Systems   Constitutional: Negative for chills, diaphoresis and fever. HENT: Negative for congestion, rhinorrhea and sore throat. Eyes: Negative for redness and visual disturbance. Respiratory: Positive for cough and shortness of breath. Negative for chest tightness and wheezing. Cardiovascular: Negative for chest pain and palpitations. Gastrointestinal: Positive for nausea. Negative for abdominal pain, blood in stool, diarrhea and vomiting. Endocrine: Negative for polydipsia and polyuria. Genitourinary: Negative for dysuria and hematuria. Musculoskeletal: Negative for arthralgias, myalgias and neck stiffness. Skin: Negative for rash. Allergic/Immunologic: Negative for environmental allergies and food allergies. Neurological: Negative for dizziness, weakness and headaches. Hematological: Negative for adenopathy. Does not bruise/bleed easily. Psychiatric/Behavioral: Negative for confusion and sleep disturbance. The patient is not nervous/anxious. Vitals:    06/30/19 1034   BP: 134/65   Pulse: 96   Resp: 22   Temp: 98.4 °F (36.9 °C)   SpO2: (!) 82%   Weight: 92.5 kg (204 lb)   Height: 5' 4\" (1.626 m)            Physical Exam   Constitutional: She is oriented to person, place, and time.  She appears well-developed and well-nourished. HENT:   Head: Normocephalic and atraumatic. Eyes: Pupils are equal, round, and reactive to light. Conjunctivae are normal.   Neck: Normal range of motion. Neck supple. Cardiovascular: Normal rate, regular rhythm and normal heart sounds. Pulmonary/Chest: No respiratory distress. She has wheezes. She exhibits no tenderness. Tachypnea    Fairly severe wheezing bilaterally with poor air movement   Abdominal: Soft. Bowel sounds are normal.   Musculoskeletal: Normal range of motion. Neurological: She is alert and oriented to person, place, and time. Skin: Skin is warm and dry. Nursing note and vitals reviewed. MDM  Number of Diagnoses or Management Options  Diagnosis management comments: Patient's room air sat was 82. I will get a chest x-ray and give her an hour-long albuterol and steroids. Given her nausea and her gallbladder concerns I will also check a CMP and lipase. 12:05 PM  Patient with slight improvement of her air movement after the breathing treatment. Still with fairly severe diffuse wheezing though. Her ABG had a PO2 of only 100 despite being on 4 L of oxygen. 12:31 PM  After her treatment patient's oxygen level dropped down to 87% on room air. Therefore I will discuss the case with the hospitalist for admission. 12:35 PM  I spoke with the hospitalist who kindly agreed to see the patient.          Procedures

## 2019-06-30 NOTE — PROGRESS NOTES
TRANSFER - IN REPORT:    Verbal report received from SANTIAGO Sandoval on [facility]  being received from ED for routine progression of care      Report consisted of patients Situation, Background, Assessment and   Recommendations(SBAR). Information from the following report(s) SBAR, Kardex, ED Summary, Intake/Output, MAR, Accordion, Recent Results and Med Rec Status was reviewed with the receiving nurse. Opportunity for questions and clarification was provided. Assessment completed upon patients arrival to unit and care assumed.

## 2019-06-30 NOTE — H&P
Hospitalist H&P Note     Admit Date:  2019 10:30 AM   Name:  Joanna Rodriguez   Age:  81 y.o.  :  1950   MRN:  [id number]   PCP:  Ayala Davila MD  Treatment Team: Attending Provider: Hattie Guan MD; Primary Nurse: Eleanor Segura RN    HPI:     CC: shortness of breath       Ms. Roman Pallas is a 82 yo female with PMH of COPD, tobacco use, polycythemia, obesity, depression who is evaluated with refractory dyspnea,cough, wheezing. CXR no acute issues, she failed to improve with nebulizers/steroids and is hypoxic 81 RA. She does not follow with pulmonary but has home nebulizers. 10 systems reviewed and negative except as noted in HPI. - has anorexia, arthritis, skin changes, sweats and paresthesia         Past Medical History:   Diagnosis Date    COPD     Emphysema     Essential hypertension 6/10/2018    GERD (gastroesophageal reflux disease) 6/10/2018    Psychiatric disorder     anxiety, depression      History reviewed. No pertinent surgical history. Allergies   Allergen Reactions    Aspirin Itching    Codeine Nausea Only    Iodine Hives    Nuts [Tree Nut] Swelling    Sodium Benzoate Itching    Sulfa (Sulfonamide Antibiotics) Itching      Social History     Tobacco Use    Smoking status: Former Smoker     Packs/day: 0.25    Smokeless tobacco: Never Used   Substance Use Topics    Alcohol use: No      Family History   Problem Relation Age of Onset    Breast Cancer Mother 61    Breast Cancer Sister 39    Breast Cancer Maternal Aunt         There is no immunization history on file for this patient. PTA Medications:  Prior to Admission Medications   Prescriptions Last Dose Informant Patient Reported? Taking? MULTIVITAMINS W-MINERALS/LUT (CENTRUM SILVER PO)   Yes No   Sig: Take  by mouth.    albuterol (VENTOLIN HFA) 90 mcg/actuation inhaler   Yes No   Sig: Ventolin HFA 90 mcg/actuation aerosol inhaler   aspirin delayed-release 81 mg tablet   Yes No   Sig: Take  by mouth daily.   buPROPion SR (WELLBUTRIN SR) 150 mg SR tablet   Yes No   Sig: Take 150 mg by mouth two (2) times a day. citalopram (CELEXA) 10 mg tablet   Yes No   Sig: Take  by mouth daily. fluticasone-salmeterol (ADVAIR DISKUS) 250-50 mcg/dose diskus inhaler   Yes No   Sig: Take 1 Puff by inhalation every twelve (12) hours. hydrochlorothiazide (HYDRODIURIL) 25 mg tablet   Yes No   Sig: Take 25 mg by mouth daily. lamoTRIgine (LAMICTAL) 100 mg tablet   Yes No   Sig: Take  by mouth two (2) times a day. raNITIdine (ZANTAC 75) 75 mg tablet   Yes No   Sig: Take 75 mg by mouth two (2) times a day. Facility-Administered Medications: None       Objective:     Patient Vitals for the past 24 hrs:   Temp Pulse Resp BP SpO2   06/30/19 1235  (!) 103 30  93 %   06/30/19 1234  94 27  93 %   06/30/19 1231  (!) 103 22  (!) 87 %   06/30/19 1229  96 21  (!) 88 %   06/30/19 1226  98 23  93 %   06/30/19 1223  95 25  93 %   06/30/19 1216  (!) 102 27  91 %   06/30/19 1205  98 25  95 %   06/30/19 1154  87 25  97 %   06/30/19 1141  82 23  97 %   06/30/19 1139  88  112/62 96 %   06/30/19 1125     95 %   06/30/19 1037  88   92 %   06/30/19 1035  92   (!) 84 %   06/30/19 1034 98.4 °F (36.9 °C) 96 22 134/65 (!) 81 %     Oxygen Therapy  O2 Sat (%): 93 % (06/30/19 1235)  Pulse via Oximetry: 102 beats per minute (06/30/19 1235)  O2 Device: Room air (06/30/19 1034)  No intake or output data in the 24 hours ending 06/30/19 1306    Physical Exam:  General:    Alert. Seems short of breath, speaks in short sentences, diffuse wheezing  Eyes:   Normal sclera. Extraocular movements intact. PERRLA  ENT:  Normocephalic, atraumatic. Dry  mucous membranes  CV:   RRR. No m/r/g. No edema  Lungs:  Diffuse expiratory wheezing  Abdomen: Soft, nontender, nondistended. Present BS, obese  Extremities: Warm and dry. .  Neurologic:  grossly intact. Skin:     No rashes or jaundice.   Normal coloration  Psych:  Normal mood and affect. I reviewed the labs, imaging, EKGs, telemetry, and other studies done this admission. Data Review:   Recent Results (from the past 24 hour(s))   CBC WITH AUTOMATED DIFF    Collection Time: 06/30/19 10:42 AM   Result Value Ref Range    WBC 6.2 4.3 - 11.1 K/uL    RBC 5.02 4.05 - 5.2 M/uL    HGB 14.5 11.7 - 15.4 g/dL    HCT 45.0 35.8 - 46.3 %    MCV 89.6 79.6 - 97.8 FL    MCH 28.9 26.1 - 32.9 PG    MCHC 32.2 31.4 - 35.0 g/dL    RDW 13.2 11.9 - 14.6 %    PLATELET 019 497 - 016 K/uL    MPV 9.1 (L) 9.4 - 12.3 FL    ABSOLUTE NRBC 0.00 0.0 - 0.2 K/uL    DF AUTOMATED      NEUTROPHILS 71 43 - 78 %    LYMPHOCYTES 18 13 - 44 %    MONOCYTES 11 4.0 - 12.0 %    EOSINOPHILS 0 (L) 0.5 - 7.8 %    BASOPHILS 0 0.0 - 2.0 %    IMMATURE GRANULOCYTES 0 0.0 - 5.0 %    ABS. NEUTROPHILS 4.4 1.7 - 8.2 K/UL    ABS. LYMPHOCYTES 1.1 0.5 - 4.6 K/UL    ABS. MONOCYTES 0.7 0.1 - 1.3 K/UL    ABS. EOSINOPHILS 0.0 0.0 - 0.8 K/UL    ABS. BASOPHILS 0.0 0.0 - 0.2 K/UL    ABS. IMM. GRANS. 0.0 0.0 - 0.5 K/UL   METABOLIC PANEL, COMPREHENSIVE    Collection Time: 06/30/19 10:42 AM   Result Value Ref Range    Sodium 138 136 - 145 mmol/L    Potassium 4.2 3.5 - 5.1 mmol/L    Chloride 102 98 - 107 mmol/L    CO2 30 21 - 32 mmol/L    Anion gap 6 (L) 7 - 16 mmol/L    Glucose 104 (H) 65 - 100 mg/dL    BUN 6 (L) 8 - 23 MG/DL    Creatinine 0.71 0.6 - 1.0 MG/DL    GFR est AA >60 >60 ml/min/1.73m2    GFR est non-AA >60 >60 ml/min/1.73m2    Calcium 9.1 8.3 - 10.4 MG/DL    Bilirubin, total 0.4 0.2 - 1.1 MG/DL    ALT (SGPT) 20 12 - 65 U/L    AST (SGOT) 14 (L) 15 - 37 U/L    Alk.  phosphatase 64 50 - 136 U/L    Protein, total 7.2 6.3 - 8.2 g/dL    Albumin 3.6 3.2 - 4.6 g/dL    Globulin 3.6 (H) 2.3 - 3.5 g/dL    A-G Ratio 1.0 (L) 1.2 - 3.5     LIPASE    Collection Time: 06/30/19 10:42 AM   Result Value Ref Range    Lipase 139 73 - 393 U/L   POC G3    Collection Time: 06/30/19 11:36 AM   Result Value Ref Range    Device: NASAL CANNULA      pH (POC) 7.350 7.35 - 7.45      pCO2 (POC) 48.7 (H) 35 - 45 MMHG    pO2 (POC) 100 75 - 100 MMHG    HCO3 (POC) 26.9 (H) 22 - 26 MMOL/L    sO2 (POC) 97 95 - 98 %    Base excess (POC) 0 mmol/L    Allens test (POC) YES      Site LEFT RADIAL      Patient temp. 98.6      Specimen type (POC) ARTERIAL      Performed by Jarod     CO2, POC 28 MMOL/L    Flow rate (POC) 4.000 L/min    COLLECT TIME 1,132         All Micro Results     None          Other Studies:  Xr Chest Pa Lat    Result Date: 6/30/2019  CHEST X-RAY, 2 views. HISTORY:  Cough. TECHNIQUE: PA and lateral views. COMPARISON: None. FINDINGS: The lungs are hyperinflated and clear. The heart size is normal. The costophrenic angles are sharp. The pulmonary vasculature is unremarkable. Included portion of the upper abdomen is unremarkable. Mild degenerative changes thoracic spine. IMPRESSION: Negative for acute abnormality.        Assessment and Plan:     Hospital Problems as of 6/30/2019 Date Reviewed: 3/6/2019          Codes Class Noted - Resolved POA    * (Principal) Acute respiratory failure with hypoxia ([facility] ICD-10-CM: J96.01  ICD-9-CM: 518.81  6/30/2019 - Present Yes        Tobacco use (Chronic) ICD-10-CM: Z72.0  ICD-9-CM: 305.1  6/30/2019 - Present Yes        COPD exacerbation ([facility] ICD-10-CM: J44.1  ICD-9-CM: 491.21  6/30/2019 - Present Yes        Obesity (Chronic) ICD-10-CM: E66.9  ICD-9-CM: 278.00  6/30/2019 - Present Yes        Major depressive disorder ICD-10-CM: F32.9  ICD-9-CM: 296.20  6/10/2018 - Present Yes        Polycythemia ICD-10-CM: D75.1  ICD-9-CM: 238.4  6/10/2018 - Present Yes              · Acute hypoxic respiratory failure with COPD exacerbation: admit to medical bed, schedule pulmicort/ albuterol O2 to wean as tolerant, add scheduled solumedrol, will need to have pulmonary outpatient for longterm followup, has stopped smoking 6 months ago  · Polycythemia: resolved, stable CBC   · Depression: continue home meds     Discharge planning:  PPD, pending   DVT ppx: heparin  Code status:  Full  Estimated LOS:  Greater than 2 midnights  Risk:  high  Care plan: sister Victorino Reed [phone]  Signed:  Maria Del Carmen Tate MD

## 2019-06-30 NOTE — ROUTINE PROCESS
TRANSFER - OUT REPORT: 
 
Verbal report given to Isha Crawford on [facility]  being transferred to room [phone] for routine progression of care Report consisted of patients Situation, Background, Assessment and  
Recommendations(SBAR). Information from the following report(s) ED Summary was reviewed with the receiving nurse. Lines:  
Peripheral IV 06/30/19 Right Forearm (Active) Site Assessment Clean, dry, & intact 6/30/2019 11:51 AM  
Phlebitis Assessment 0 6/30/2019 11:51 AM  
Infiltration Assessment 0 6/30/2019 11:51 AM  
Dressing Status Clean, dry, & intact 6/30/2019 11:51 AM  
Dressing Type Transparent;Tape 6/30/2019 11:51 AM  
Hub Color/Line Status Pink;Patent; Flushed 6/30/2019 11:51 AM  
  
 
Opportunity for questions and clarification was provided. Patient transported with: 
 O2 @ 4 liters

## 2019-06-30 NOTE — PROGRESS NOTES
Dual skin assessment completed with Tammi Baeza RN indicated the following: generalized areas of dry flaky skin. Generalized ecchymosis.

## 2019-06-30 NOTE — ED TRIAGE NOTES
Reports SOB and cough x 3 weeks.  has already taken a round of prednisone and antibiotics with no relief.  has been using her nebulizer at home with no relief.

## 2019-06-30 NOTE — PROGRESS NOTES
Patient arrived to room 801, via stretcher; accompanied by transport. Assessment completed and documented, see docflow. Patient sitting upright in stretcher, A/Ox3. Ambulatory to bed, with standby assist. NAD noted at present. Respirations even and non labored on 4LNC. VSS. Encouraged patient to call for needs. Call light within reach. Will continue to monitor.

## 2019-06-30 NOTE — PROGRESS NOTES
Problem: Falls - Risk of  Goal: *Absence of Falls  Description  Document Lew Tapia Fall Risk and appropriate interventions in the flowsheet.   Outcome: Progressing Towards Goal     Problem: Patient Education: Go to Patient Education Activity  Goal: Patient/Family Education  Outcome: Progressing Towards Goal     Problem: Breathing Pattern - Ineffective  Goal: *Absence of hypoxia  Outcome: Progressing Towards Goal  Goal: *Use of effective breathing techniques  Outcome: Progressing Towards Goal  Goal: *PALLIATIVE CARE:  Alleviation of Dyspnea  Outcome: Progressing Towards Goal

## 2019-07-01 LAB
ANION GAP SERPL CALC-SCNC: 8 MMOL/L (ref 7–16)
BASOPHILS # BLD: 0 K/UL (ref 0–0.2)
BASOPHILS NFR BLD: 0 % (ref 0–2)
BUN SERPL-MCNC: 11 MG/DL (ref 8–23)
CALCIUM SERPL-MCNC: 8.9 MG/DL (ref 8.3–10.4)
CHLORIDE SERPL-SCNC: 97 MMOL/L (ref 98–107)
CO2 SERPL-SCNC: 29 MMOL/L (ref 21–32)
CREAT SERPL-MCNC: 0.84 MG/DL (ref 0.6–1)
DIFFERENTIAL METHOD BLD: ABNORMAL
EOSINOPHIL # BLD: 0 K/UL (ref 0–0.8)
EOSINOPHIL NFR BLD: 0 % (ref 0.5–7.8)
ERYTHROCYTE [DISTWIDTH] IN BLOOD BY AUTOMATED COUNT: 12.7 % (ref 11.9–14.6)
GLUCOSE SERPL-MCNC: 164 MG/DL (ref 65–100)
HCT VFR BLD AUTO: 41.5 % (ref 35.8–46.3)
HGB BLD-MCNC: 13.4 G/DL (ref 11.7–15.4)
IMM GRANULOCYTES # BLD AUTO: 0 K/UL (ref 0–0.5)
IMM GRANULOCYTES NFR BLD AUTO: 1 % (ref 0–5)
LYMPHOCYTES # BLD: 0.8 K/UL (ref 0.5–4.6)
LYMPHOCYTES NFR BLD: 18 % (ref 13–44)
MCH RBC QN AUTO: 28.3 PG (ref 26.1–32.9)
MCHC RBC AUTO-ENTMCNC: 32.3 G/DL (ref 31.4–35)
MCV RBC AUTO: 87.7 FL (ref 79.6–97.8)
MONOCYTES # BLD: 0.1 K/UL (ref 0.1–1.3)
MONOCYTES NFR BLD: 3 % (ref 4–12)
NEUTS SEG # BLD: 3.7 K/UL (ref 1.7–8.2)
NEUTS SEG NFR BLD: 79 % (ref 43–78)
NRBC # BLD: 0 K/UL (ref 0–0.2)
PLATELET # BLD AUTO: 238 K/UL (ref 150–450)
PMV BLD AUTO: 9.3 FL (ref 9.4–12.3)
POTASSIUM SERPL-SCNC: 4.1 MMOL/L (ref 3.5–5.1)
RBC # BLD AUTO: 4.73 M/UL (ref 4.05–5.2)
SODIUM SERPL-SCNC: 134 MMOL/L (ref 136–145)
WBC # BLD AUTO: 4.6 K/UL (ref 4.3–11.1)

## 2019-07-01 PROCEDURE — 74011000250 HC RX REV CODE- 250: Performed by: INTERNAL MEDICINE

## 2019-07-01 PROCEDURE — 74011250636 HC RX REV CODE- 250/636: Performed by: INTERNAL MEDICINE

## 2019-07-01 PROCEDURE — 94760 N-INVAS EAR/PLS OXIMETRY 1: CPT

## 2019-07-01 PROCEDURE — 65270000029 HC RM PRIVATE

## 2019-07-01 PROCEDURE — 74011000250 HC RX REV CODE- 250: Performed by: HOSPITALIST

## 2019-07-01 PROCEDURE — 36415 COLL VENOUS BLD VENIPUNCTURE: CPT

## 2019-07-01 PROCEDURE — 80048 BASIC METABOLIC PNL TOTAL CA: CPT

## 2019-07-01 PROCEDURE — 74011250637 HC RX REV CODE- 250/637: Performed by: INTERNAL MEDICINE

## 2019-07-01 PROCEDURE — 77010033678 HC OXYGEN DAILY

## 2019-07-01 PROCEDURE — 94640 AIRWAY INHALATION TREATMENT: CPT

## 2019-07-01 PROCEDURE — 99223 1ST HOSP IP/OBS HIGH 75: CPT | Performed by: INTERNAL MEDICINE

## 2019-07-01 PROCEDURE — 85025 COMPLETE CBC W/AUTO DIFF WBC: CPT

## 2019-07-01 PROCEDURE — 74011250637 HC RX REV CODE- 250/637: Performed by: HOSPITALIST

## 2019-07-01 RX ORDER — CITALOPRAM 20 MG/1
20 TABLET, FILM COATED ORAL 2 TIMES DAILY
Status: DISCONTINUED | OUTPATIENT
Start: 2019-07-01 | End: 2019-07-03 | Stop reason: HOSPADM

## 2019-07-01 RX ORDER — IPRATROPIUM BROMIDE AND ALBUTEROL SULFATE 2.5; .5 MG/3ML; MG/3ML
3 SOLUTION RESPIRATORY (INHALATION)
Status: DISCONTINUED | OUTPATIENT
Start: 2019-07-01 | End: 2019-07-03 | Stop reason: HOSPADM

## 2019-07-01 RX ORDER — PREDNISONE 10 MG/1
40 TABLET ORAL
Status: DISCONTINUED | OUTPATIENT
Start: 2019-07-02 | End: 2019-07-03 | Stop reason: HOSPADM

## 2019-07-01 RX ORDER — LEVALBUTEROL INHALATION SOLUTION 0.63 MG/3ML
0.63 SOLUTION RESPIRATORY (INHALATION)
Status: DISCONTINUED | OUTPATIENT
Start: 2019-07-01 | End: 2019-07-01

## 2019-07-01 RX ORDER — GUAIFENESIN 600 MG/1
1200 TABLET, EXTENDED RELEASE ORAL EVERY 12 HOURS
Status: DISCONTINUED | OUTPATIENT
Start: 2019-07-01 | End: 2019-07-03 | Stop reason: HOSPADM

## 2019-07-01 RX ADMIN — LEVOFLOXACIN 750 MG: 500 TABLET, FILM COATED ORAL at 11:28

## 2019-07-01 RX ADMIN — ALBUTEROL SULFATE 2.5 MG: 2.5 SOLUTION RESPIRATORY (INHALATION) at 02:22

## 2019-07-01 RX ADMIN — HEPARIN SODIUM 5000 UNITS: 5000 INJECTION INTRAVENOUS; SUBCUTANEOUS at 21:25

## 2019-07-01 RX ADMIN — CITALOPRAM HYDROBROMIDE 20 MG: 20 TABLET ORAL at 17:00

## 2019-07-01 RX ADMIN — LEVALBUTEROL HYDROCHLORIDE 0.63 MG: 0.63 SOLUTION RESPIRATORY (INHALATION) at 11:09

## 2019-07-01 RX ADMIN — METHYLPREDNISOLONE SODIUM SUCCINATE 40 MG: 125 INJECTION, POWDER, FOR SOLUTION INTRAMUSCULAR; INTRAVENOUS at 21:26

## 2019-07-01 RX ADMIN — IPRATROPIUM BROMIDE AND ALBUTEROL SULFATE 3 ML: .5; 3 SOLUTION RESPIRATORY (INHALATION) at 19:00

## 2019-07-01 RX ADMIN — METHYLPREDNISOLONE SODIUM SUCCINATE 60 MG: 125 INJECTION, POWDER, FOR SOLUTION INTRAMUSCULAR; INTRAVENOUS at 05:43

## 2019-07-01 RX ADMIN — HEPARIN SODIUM 5000 UNITS: 5000 INJECTION INTRAVENOUS; SUBCUTANEOUS at 05:49

## 2019-07-01 RX ADMIN — FAMOTIDINE 20 MG: 20 TABLET ORAL at 09:04

## 2019-07-01 RX ADMIN — GUAIFENESIN 1200 MG: 600 TABLET, EXTENDED RELEASE ORAL at 09:05

## 2019-07-01 RX ADMIN — BUDESONIDE 500 MCG: 0.5 INHALANT RESPIRATORY (INHALATION) at 07:08

## 2019-07-01 RX ADMIN — ALBUTEROL SULFATE 2.5 MG: 2.5 SOLUTION RESPIRATORY (INHALATION) at 07:08

## 2019-07-01 RX ADMIN — Medication 10 ML: at 05:48

## 2019-07-01 RX ADMIN — BUDESONIDE 500 MCG: 0.5 INHALANT RESPIRATORY (INHALATION) at 19:00

## 2019-07-01 RX ADMIN — METHYLPREDNISOLONE SODIUM SUCCINATE 60 MG: 125 INJECTION, POWDER, FOR SOLUTION INTRAMUSCULAR; INTRAVENOUS at 11:28

## 2019-07-01 RX ADMIN — GUAIFENESIN 1200 MG: 600 TABLET, EXTENDED RELEASE ORAL at 21:24

## 2019-07-01 RX ADMIN — HEPARIN SODIUM 5000 UNITS: 5000 INJECTION INTRAVENOUS; SUBCUTANEOUS at 15:29

## 2019-07-01 RX ADMIN — Medication 10 ML: at 21:27

## 2019-07-01 RX ADMIN — Medication 10 ML: at 13:13

## 2019-07-01 RX ADMIN — IPRATROPIUM BROMIDE AND ALBUTEROL SULFATE 3 ML: .5; 3 SOLUTION RESPIRATORY (INHALATION) at 15:15

## 2019-07-01 RX ADMIN — FAMOTIDINE 20 MG: 20 TABLET ORAL at 17:00

## 2019-07-01 RX ADMIN — ASPIRIN 81 MG: 81 TABLET, COATED ORAL at 09:03

## 2019-07-01 NOTE — PROGRESS NOTES
Spoke with Dr. Roge Christy about pt's home dose of citalopram. MD ordered to change medication to home dosage.

## 2019-07-01 NOTE — PROGRESS NOTES
LMSW met with pt this am for initial assessment. Pt lives in her home and is normally independent with ADL's. Her Mother resides with her and she has a supportive sister. Will follow plan of care and assist as needed. Care Management Interventions  PCP Verified by CM: (Dr Svitlana Treviño)  Mode of Transport at Discharge: (family)  Transition of Care Consult (CM Consult): Discharge Planning(Pt is insured by [facility] [email or web address] with pharmacy benefits. She reports that she has applied for [facility] financial assistance for copays with the financial counselor at the [employer].)  Discharge Durable Medical Equipment: No  Physical Therapy Consult: No  Occupational Therapy Consult: No  Speech Therapy Consult: No  Current Support Network: Own Home, Family Lives Nearby(Pt lives in her home and her Mother lives with her.   Supportive sister at bedside.  )  Confirm Follow Up Transport: Family  Plan discussed with Pt/Family/Caregiver: Yes  Freedom of Choice Offered: Yes  [address] Provided?: No  Discharge Location  Discharge Placement: Home

## 2019-07-01 NOTE — PROGRESS NOTES
Bedside report received from  Payne, [facility]. Assessment completed. Bed is in low and locked position, with floor free of clutter. Respirations even and unlabored Breath sounds coarse with wheezing. Alert and Oriented x4. NC at 3 L O2. Call light within reach.

## 2019-07-01 NOTE — PROGRESS NOTES
Pt in bed resting. Awake. Respirations present. Bed low and locked. Call light within reach with safety measures in place. No s/sx of distress.

## 2019-07-01 NOTE — PROGRESS NOTES
Pt is sitting on the side of bed getting a breathing treatment. Respirations even and unlabored. No s/sx of distress. NC at 3L. . [facility]

## 2019-07-01 NOTE — CONSULTS
CONSULT NOTE    Vannessa Sears    7/1/2019    Date of Admission:  6/30/2019    The patient's chart is reviewed and the patient is discussed with the staff. Subjective:     Patient is a 81 y.o.  female seen and evaluated at the request of Dr. Mela Mcnamara. She is a 80 y/o female with history of COPD, tobacco abuse, polycythemia, obesity, depression, admitted yesterday with AECOPD and acute hypoxemic respiratory failure. In ED she had a negative CXR, failed to resolve with nebulizers, steroids and was hypoxemic to 81% on RA and was admitted by hospitalist service. She has not previously seen pulmonary anywhere for these issues. She reports improved symptoms since yesterday, but still has some wheezing, cough and shortness of breath (improving). We were consulted for these findings and lack of prior pulmonary evaluation. Reports has been sick for about a month, got steroids and antibiotics about 2 weeks ago from [facility] and felt a little better, but then worsened over the past week and couldn't breath yesterday. States has not had PFTs for years, but did see pulmonary perhapse 20 years ago and has done PFTs though [facility] COPD research program, but doesn't remember any of these results. She stopped smoking in Feb 2019 and hasn't smoked a cigarette since. She was switched to [facility] a few months ago after switching Advair to Symbicort and having upper airway irritation. She has never had difficulty with albuterol or had arrhythmias with it. She does not remember ever taking Spiriva or Incruse and doesn't know what it is. She reports normally she can walk on flat ground without significant limiting dyspnea, but is limited with walking up 1 flight of stairs for many years. She was on O2 in the distant past, but slowly weaned off and hasn't used for nearly 20 years. She thinks she has had 3-4 courses of steroids/antibiotics in 2019, perhaps 3 in 2018, but hasn't been hospitalized for many years. Polycythemia has been evaluated by hematology, felt secondary to smoking, carbonmonoxide level was 10.5% and Jak2 was negative. Polycythemia resolved during re-evaluation after stopping smoking, however she has restarted smoking. Review of Systems  A comprehensive review of systems was negative except for that written in the HPI. Patient Active Problem List   Diagnosis Code    COPD (chronic obstructive pulmonary disease) with chronic bronchitis (HCC) J44.9    Major depressive disorder F32.9    Family history of breast cancer Z80.3    Current smoker F17.200    Class 1 obesity due to excess calories in adult E66.09    Essential hypertension I10    GERD (gastroesophageal reflux disease) K21.9    Chronic fatigue R53.82    Polycythemia D75.1    Acute respiratory failure with hypoxia ([facility]) J96.01    Tobacco use Z72.0    COPD exacerbation ([facility]) J44.1    Obesity E66.9     Prior to Admission Medications   Prescriptions Last Dose Informant Patient Reported? Taking? MULTIVITAMINS W-MINERALS/LUT (CENTRUM SILVER PO) Not Taking at Unknown time  Yes No   Sig: Take  by mouth. albuterol (VENTOLIN HFA) 90 mcg/actuation inhaler 6/30/2019 at Unknown time  Yes Yes   Sig: Ventolin HFA 90 mcg/actuation aerosol inhaler   aspirin delayed-release 81 mg tablet 6/29/2019 at Unknown time  Yes Yes   Sig: Take  by mouth daily. buPROPion SR (WELLBUTRIN SR) 150 mg SR tablet Not Taking at Unknown time  Yes No   Sig: Take 150 mg by mouth two (2) times a day. citalopram (CELEXA) 10 mg tablet   Yes No   Sig: Take  by mouth daily. fluticasone-salmeterol (ADVAIR DISKUS) 250-50 mcg/dose diskus inhaler Not Taking at Unknown time  Yes No   Sig: Take 1 Puff by inhalation every twelve (12) hours. hydrochlorothiazide (HYDRODIURIL) 25 mg tablet Not Taking at Unknown time  Yes No   Sig: Take 25 mg by mouth daily. lamoTRIgine (LAMICTAL) 100 mg tablet Not Taking at Unknown time  Yes No   Sig: Take  by mouth two (2) times a day. raNITIdine (ZANTAC 75) 75 mg tablet Not Taking at Unknown time  Yes No   Sig: Take 75 mg by mouth two (2) times a day. Facility-Administered Medications: None     Past Medical History:   Diagnosis Date    COPD     Emphysema     Essential hypertension 6/10/2018    GERD (gastroesophageal reflux disease) 6/10/2018    Psychiatric disorder     anxiety, depression     History reviewed. No pertinent surgical history.   Social History     Socioeconomic History    Marital status:      Spouse name: Not on file    Number of children: Not on file    Years of education: Not on file    Highest education level: Not on file   Occupational History    Not on file   Social Needs    Financial resource strain: Not on file    Food insecurity:     Worry: Not on file     Inability: Not on file    Transportation needs:     Medical: Not on file     Non-medical: Not on file   Tobacco Use    Smoking status: Former Smoker     Packs/day: 0.25    Smokeless tobacco: Never Used   Substance and Sexual Activity    Alcohol use: No    Drug use: No    Sexual activity: Not on file   Lifestyle    Physical activity:     Days per week: Not on file     Minutes per session: Not on file    Stress: Not on file   Relationships    Social connections:     Talks on phone: Not on file     Gets together: Not on file     Attends Taoism service: Not on file     Active member of club or organization: Not on file     Attends meetings of clubs or organizations: Not on file     Relationship status: Not on file    Intimate partner violence:     Fear of current or ex partner: Not on file     Emotionally abused: Not on file     Physically abused: Not on file     Forced sexual activity: Not on file   Other Topics Concern    Not on file   Social History Narrative    Not on file     Family History   Problem Relation Age of Onset    Breast Cancer Mother 61    Breast Cancer Sister 39    Breast Cancer Maternal Aunt      Allergies Allergen Reactions    Aspirin Itching    Codeine Nausea Only    Iodine Hives    Nuts [Tree Nut] Swelling    Sodium Benzoate Itching    Sulfa (Sulfonamide Antibiotics) Itching     Current Facility-Administered Medications   Medication Dose Route Frequency    guaiFENesin ER (MUCINEX) tablet 1,200 mg  1,200 mg Oral Q12H    levalbuterol (XOPENEX) nebulizer soln 0.63 mg/3 mL  0.63 mg Nebulization Q4H RT    citalopram (CELEXA) tablet 20 mg  20 mg Oral BID    levoFLOXacin (LEVAQUIN) tablet 750 mg  750 mg Oral Q24H    aspirin delayed-release tablet 81 mg  81 mg Oral DAILY    sodium chloride (NS) flush 5-40 mL  5-40 mL IntraVENous Q8H    sodium chloride (NS) flush 5-40 mL  5-40 mL IntraVENous PRN    acetaminophen (TYLENOL) tablet 650 mg  650 mg Oral Q6H PRN    HYDROcodone-acetaminophen (NORCO) 5-325 mg per tablet 1 Tab  1 Tab Oral Q4H PRN    ondansetron (ZOFRAN) injection 4 mg  4 mg IntraVENous Q4H PRN    heparin (porcine) injection 5,000 Units  5,000 Units SubCUTAneous Q8H    methylPREDNISolone (PF) (SOLU-MEDROL) injection 60 mg  60 mg IntraVENous Q6H    budesonide (PULMICORT) 500 mcg/2 ml nebulizer suspension  500 mcg Nebulization BID RT    famotidine (PEPCID) tablet 20 mg  20 mg Oral BID     Objective:     Vitals:    07/01/19 0321 07/01/19 0708 07/01/19 0734 07/01/19 1109   BP: 101/64  109/61    Pulse: 88  82    Resp: 20  22    Temp: 98 °F (36.7 °C)  98 °F (36.7 °C)    SpO2: 90% 93% 90% 93%   Weight: 210 lb 8.6 oz (95.5 kg)      Height:         PHYSICAL EXAM     Constitutional:  the patient is well developed and in no acute distress  EENMT:  Sclera clear, pupils equal, oral mucosa moist  Respiratory: expiratory wheeze, 2L NC  Cardiovascular:  RRR without M,G,R  Gastrointestinal: soft and non-tender; with positive bowel sounds. Musculoskeletal: warm without cyanosis. There is no lower extremity edema.   Skin:  no jaundice or rashes, no wounds   Neurologic: no gross neuro deficits     Psychiatric: alert and oriented x 4    CXR:  No acute findings or infiltrate      Recent Labs     07/01/19  0554 06/30/19  1042   WBC 4.6 6.2   HGB 13.4 14.5   HCT 41.5 45.0    246     Recent Labs     07/01/19  0554 06/30/19  1042   * 138   K 4.1 4.2   CL 97* 102   * 104*   CO2 29 30   BUN 11 6*   CREA 0.84 0.71   CA 8.9 9.1   ALB  --  3.6   TBILI  --  0.4   ALT  --  20   SGOT  --  14*     Recent Labs     06/30/19  1136   PHI 7.350   PCO2I 48.7*   PO2I 100   HCO3I 26.9*     No results for input(s): LCAD, LAC in the last 72 hours.     Assessment:  (Medical Decision Making)     Hospital Problems  Date Reviewed: 3/6/2019          Codes Class Noted POA    * (Principal) Acute respiratory failure with hypoxia ([facility] ICD-10-CM: J96.01  ICD-9-CM: 518.81  6/30/2019 Yes        Tobacco use (Chronic) ICD-10-CM: Z72.0  ICD-9-CM: 305.1  6/30/2019 Yes        COPD exacerbation ([facility] ICD-10-CM: J44.1  ICD-9-CM: 491.21  6/30/2019 Yes        Obesity (Chronic) ICD-10-CM: E66.9  ICD-9-CM: 278.00  6/30/2019 Yes        Major depressive disorder ICD-10-CM: F32.9  ICD-9-CM: 296.20  6/10/2018 Yes        Polycythemia ICD-10-CM: D75.1  ICD-9-CM: 238.4  6/10/2018 Yes              Plan:  (Medical Decision Making)     --agree with evaluation and management by hospitalist service  --agree with IV steroids for now, but wean slightly given high dose and change to PO tomorrow  --agree with levaquin given mildly purulent sputum reported  --agree with pulmicort, change Xopenex to Duoneb  --when discharged would change Advair to Trelegy (add LAMA) and continue albuterol PRN  --most important intervention is smoking cessation  --she should follow up in 2-4 weeks for spirometry and outpatient evaluation of presumed COPD  --wean O2 as tolerated to RA, she was not requiring O2 prior to admission and suspect she won't at discharge    More than 50% of the time documented was spent in face-to-face contact with the patient and in the care of the patient on the floor/unit where the patient is located. Thank you very much for this referral.  We appreciate the opportunity to participate in this patient's care. Will follow along with above stated plan.     Ajith Centeno MD

## 2019-07-01 NOTE — PROGRESS NOTES
Problem: Breathing Pattern - Ineffective  Goal: *Absence of hypoxia  Outcome: Progressing Towards Goal     Problem: Chronic Obstructive Pulmonary Disease (COPD)  Goal: *Oxygen saturation during activity within specified parameters  Outcome: Progressing Towards Goal  Goal: *Able to remain out of bed as prescribed  Outcome: Progressing Towards Goal  Goal: *Absence of hypoxia  Outcome: Progressing Towards Goal  Goal: *Optimize nutritional status  Outcome: Progressing Towards Goal

## 2019-07-01 NOTE — PROGRESS NOTES
[facility]. Pt stable. Assessment complete. Pt is sitting up on side of bed. Resp even, unlabored. Pt is alert, orient X 4. Pt appears in no acute distress at this time. Pt is on 2L nasal cannula 02. Pt complains or shortness of breath and productive cough. Pt denies pain. Pt encouraged to call for assistance, call light in reach. Safety measures in place.  Will continue to monitor

## 2019-07-01 NOTE — PROGRESS NOTES
SBAR end of shift report given to oncoming [facility]NSanta. Pt voices no complaints or concerns at this time.

## 2019-07-01 NOTE — PROGRESS NOTES
Hospitalist progress note     Admit Date:  2019 10:30 AM   Name:  Cassie Zamudio   Age:  81 y.o.  :  1950   MRN:  [id number]   PCP:  Aurora Chew MD  Treatment Team: Attending Provider: Amanda Webber MD    Subjective:     Ms. Ky Hdez is a 82 yo female with PMH of COPD, tobacco use, polycythemia, obesity, depression admitted on  for acute COPD exacerbation leading to acute hypoxic respiratory failure. CXR no acute issues, she failed to improve with nebulizers/steroids and is hypoxic 81 RA. She does not follow with pulmonary but has home nebulizers. :  Pt comfortably sitting in bedside recliner, on 2 [address]  She reports feeling much better compared to yesterday  She denies chest pain. Still reports of wheezing. Cough and SOB improving. No other overnight events. 10 point ROS negative except what mentioned above.           Objective:     Patient Vitals for the past 24 hrs:   Temp Pulse Resp BP SpO2   19 0708     93 %   19 0321 98 °F (36.7 °C) 88 20 101/64 90 %   19 0222     95 %   19 2339 97.4 °F (36.3 °C) 76 20 102/64 92 %   19 1913 97.7 °F (36.5 °C) 88 20 111/66 93 %   19 1906     95 %   19 1625     94 %   19 1439 98.3 °F (36.8 °C) 87 20 115/71 91 %   19 1400  95  102/57 95 %   19 1300  93 22 109/59 94 %   19 1235  (!) 103 30  93 %   19 1234  94 27  93 %   19 1231  (!) 103 22  (!) 87 %   19 1229  96 21  (!) 88 %   19 1226  98 23  93 %   19 1223  95 25  93 %   19 1216  (!) 102 27  91 %   19 1205  98 25  95 %   19 1154  87 25  97 %   19 1141  82 23  97 %   19 1139  88  112/62 96 %   19 1125     95 %   19 1037  88   92 %   19 1035  92   (!) 84 %   19 1034 98.4 °F (36.9 °C) 96 22 134/65 (!) 81 %     Oxygen Therapy  O2 Sat (%): 93 % (19 0708)  Pulse via Oximetry: 91 beats per minute (07/01/19 0708)  O2 Device: Nasal cannula (07/01/19 0734)  O2 Flow Rate (L/min): 2 l/min (07/01/19 0734)    Intake/Output Summary (Last 24 hours) at 7/1/2019 0742  Last data filed at 7/1/2019 0530  Gross per 24 hour   Intake 840 ml   Output 1500 ml   Net -660 ml       Physical Exam:  General:    Alert, awake, NAD, elderly  Eyes:   Normal sclera. Extraocular movements intact. PERRLA  ENT:  Normocephalic, atraumatic. Dry  mucous membranes  CV:   RRR. No m/r/g. No edema  Lungs:  Bilateral exp wheezing with ronchi  Abdomen: Soft, nontender, nondistended. Present BS, obese  Extremities: Warm and dry. .  Neurologic: gcs 15, no focal deficits  Skin:     No rashes or jaundice. Normal coloration  Psych:  Normal mood and affect. I reviewed the labs, imaging, EKGs, telemetry, and other studies done this admission.           Data Review:   Recent Results (from the past 24 hour(s))   EKG, 12 LEAD, INITIAL    Collection Time: 06/30/19 10:39 AM   Result Value Ref Range    Ventricular Rate 83 BPM    Atrial Rate 83 BPM    P-R Interval 164 ms    QRS Duration 60 ms    Q-T Interval 352 ms    QTC Calculation (Bezet) 413 ms    Calculated P Axis 84 degrees    Calculated R Axis 89 degrees    Calculated T Axis 70 degrees    Diagnosis       Sinus rhythm with Premature supraventricular complexes  Low voltage QRS  Abnormal ECG  When compared with ECG of 10-FEB-2007 18:13,  Premature supraventricular complexes are now Present  Nonspecific T wave abnormality has replaced inverted T waves in Anterior   leads  Confirmed by Balbir Holloway ([id number]) on 6/30/2019 2:37:34 PM     CBC WITH AUTOMATED DIFF    Collection Time: 06/30/19 10:42 AM   Result Value Ref Range    WBC 6.2 4.3 - 11.1 K/uL    RBC 5.02 4.05 - 5.2 M/uL    HGB 14.5 11.7 - 15.4 g/dL    HCT 45.0 35.8 - 46.3 %    MCV 89.6 79.6 - 97.8 FL    MCH 28.9 26.1 - 32.9 PG    MCHC 32.2 31.4 - 35.0 g/dL    RDW 13.2 11.9 - 14.6 %    PLATELET 635 070 - 602 K/uL    MPV 9.1 (L) 9.4 - 12.3 FL    ABSOLUTE NRBC 0.00 0.0 - 0.2 K/uL    DF AUTOMATED      NEUTROPHILS 71 43 - 78 %    LYMPHOCYTES 18 13 - 44 %    MONOCYTES 11 4.0 - 12.0 %    EOSINOPHILS 0 (L) 0.5 - 7.8 %    BASOPHILS 0 0.0 - 2.0 %    IMMATURE GRANULOCYTES 0 0.0 - 5.0 %    ABS. NEUTROPHILS 4.4 1.7 - 8.2 K/UL    ABS. LYMPHOCYTES 1.1 0.5 - 4.6 K/UL    ABS. MONOCYTES 0.7 0.1 - 1.3 K/UL    ABS. EOSINOPHILS 0.0 0.0 - 0.8 K/UL    ABS. BASOPHILS 0.0 0.0 - 0.2 K/UL    ABS. IMM. GRANS. 0.0 0.0 - 0.5 K/UL   METABOLIC PANEL, COMPREHENSIVE    Collection Time: 06/30/19 10:42 AM   Result Value Ref Range    Sodium 138 136 - 145 mmol/L    Potassium 4.2 3.5 - 5.1 mmol/L    Chloride 102 98 - 107 mmol/L    CO2 30 21 - 32 mmol/L    Anion gap 6 (L) 7 - 16 mmol/L    Glucose 104 (H) 65 - 100 mg/dL    BUN 6 (L) 8 - 23 MG/DL    Creatinine 0.71 0.6 - 1.0 MG/DL    GFR est AA >60 >60 ml/min/1.73m2    GFR est non-AA >60 >60 ml/min/1.73m2    Calcium 9.1 8.3 - 10.4 MG/DL    Bilirubin, total 0.4 0.2 - 1.1 MG/DL    ALT (SGPT) 20 12 - 65 U/L    AST (SGOT) 14 (L) 15 - 37 U/L    Alk. phosphatase 64 50 - 136 U/L    Protein, total 7.2 6.3 - 8.2 g/dL    Albumin 3.6 3.2 - 4.6 g/dL    Globulin 3.6 (H) 2.3 - 3.5 g/dL    A-G Ratio 1.0 (L) 1.2 - 3.5     LIPASE    Collection Time: 06/30/19 10:42 AM   Result Value Ref Range    Lipase 139 73 - 393 U/L   POC G3    Collection Time: 06/30/19 11:36 AM   Result Value Ref Range    Device: NASAL CANNULA      pH (POC) 7.350 7.35 - 7.45      pCO2 (POC) 48.7 (H) 35 - 45 MMHG    pO2 (POC) 100 75 - 100 MMHG    HCO3 (POC) 26.9 (H) 22 - 26 MMOL/L    sO2 (POC) 97 95 - 98 %    Base excess (POC) 0 mmol/L    Allens test (POC) YES      Site LEFT RADIAL      Patient temp.  98.6      Specimen type (POC) ARTERIAL      Performed by Jarod     CO2, POC 28 MMOL/L    Flow rate (POC) 4.000 L/min    COLLECT TIME 9,383     METABOLIC PANEL, BASIC    Collection Time: 07/01/19  5:54 AM   Result Value Ref Range    Sodium 134 (L) 136 - 145 mmol/L Potassium 4.1 3.5 - 5.1 mmol/L    Chloride 97 (L) 98 - 107 mmol/L    CO2 29 21 - 32 mmol/L    Anion gap 8 7 - 16 mmol/L    Glucose 164 (H) 65 - 100 mg/dL    BUN 11 8 - 23 MG/DL    Creatinine 0.84 0.6 - 1.0 MG/DL    GFR est AA >60 >60 ml/min/1.73m2    GFR est non-AA >60 >60 ml/min/1.73m2    Calcium 8.9 8.3 - 10.4 MG/DL   CBC WITH AUTOMATED DIFF    Collection Time: 07/01/19  5:54 AM   Result Value Ref Range    WBC 4.6 4.3 - 11.1 K/uL    RBC 4.73 4.05 - 5.2 M/uL    HGB 13.4 11.7 - 15.4 g/dL    HCT 41.5 35.8 - 46.3 %    MCV 87.7 79.6 - 97.8 FL    MCH 28.3 26.1 - 32.9 PG    MCHC 32.3 31.4 - 35.0 g/dL    RDW 12.7 11.9 - 14.6 %    PLATELET 346 319 - 201 K/uL    MPV 9.3 (L) 9.4 - 12.3 FL    ABSOLUTE NRBC 0.00 0.0 - 0.2 K/uL    DF AUTOMATED      NEUTROPHILS 79 (H) 43 - 78 %    LYMPHOCYTES 18 13 - 44 %    MONOCYTES 3 (L) 4.0 - 12.0 %    EOSINOPHILS 0 (L) 0.5 - 7.8 %    BASOPHILS 0 0.0 - 2.0 %    IMMATURE GRANULOCYTES 1 0.0 - 5.0 %    ABS. NEUTROPHILS 3.7 1.7 - 8.2 K/UL    ABS. LYMPHOCYTES 0.8 0.5 - 4.6 K/UL    ABS. MONOCYTES 0.1 0.1 - 1.3 K/UL    ABS. EOSINOPHILS 0.0 0.0 - 0.8 K/UL    ABS. BASOPHILS 0.0 0.0 - 0.2 K/UL    ABS. IMM. GRANS. 0.0 0.0 - 0.5 K/UL       All Micro Results     None          Other Studies:  Xr Chest Pa Lat    Result Date: 6/30/2019  CHEST X-RAY, 2 views. HISTORY:  Cough. TECHNIQUE: PA and lateral views. COMPARISON: None. FINDINGS: The lungs are hyperinflated and clear. The heart size is normal. The costophrenic angles are sharp. The pulmonary vasculature is unremarkable. Included portion of the upper abdomen is unremarkable. Mild degenerative changes thoracic spine. IMPRESSION: Negative for acute abnormality.        Assessment and Plan:     Hospital Problems as of 7/1/2019 Date Reviewed: 3/6/2019          Codes Class Noted - Resolved POA    * (Principal) Acute respiratory failure with hypoxia ([facility] ICD-10-CM: J96.01  ICD-9-CM: 518.81  6/30/2019 - Present Yes        Tobacco use (Chronic) ICD-10-CM: Z72.0  ICD-9-CM: 305.1  6/30/2019 - Present Yes        COPD exacerbation ([facility] ICD-10-CM: J44.1  ICD-9-CM: 491.21  6/30/2019 - Present Yes        Obesity (Chronic) ICD-10-CM: E66.9  ICD-9-CM: 278.00  6/30/2019 - Present Yes        Major depressive disorder ICD-10-CM: F32.9  ICD-9-CM: 296.20  6/10/2018 - Present Yes        Polycythemia ICD-10-CM: D75.1  ICD-9-CM: 238.4  6/10/2018 - Present Yes              · Acute hypoxic respiratory failure with COPD exacerbation: clinically improving, still needing 2 ltr NC. Continue solumedrol IV q6h. Added mucinex, switched to xopenex. Continue pulmicort. Will start on oral levaquin. Pt has never been evaluated by Pulmonology. Will consult Pulm today.  EKG tomorrow AM to keep a close eye on QTc  · Polycythemia: resolved, stable CBC   · Depression: continue home meds     Discharge planning:  PPD, pending   DVT ppx: heparin  Code status:  Full  Risk:  high  Care plan: sister Gigi Ahuja [phone]  Signed:  Lennie Chaney MD

## 2019-07-02 LAB
ANION GAP SERPL CALC-SCNC: 7 MMOL/L (ref 7–16)
ATRIAL RATE: 64 BPM
BASOPHILS # BLD: 0 K/UL (ref 0–0.2)
BASOPHILS NFR BLD: 0 % (ref 0–2)
BUN SERPL-MCNC: 14 MG/DL (ref 8–23)
CALCIUM SERPL-MCNC: 9.5 MG/DL (ref 8.3–10.4)
CALCULATED P AXIS, ECG09: 75 DEGREES
CALCULATED R AXIS, ECG10: 79 DEGREES
CALCULATED T AXIS, ECG11: 67 DEGREES
CHLORIDE SERPL-SCNC: 100 MMOL/L (ref 98–107)
CO2 SERPL-SCNC: 29 MMOL/L (ref 21–32)
CREAT SERPL-MCNC: 0.84 MG/DL (ref 0.6–1)
DIAGNOSIS, 93000: NORMAL
DIFFERENTIAL METHOD BLD: ABNORMAL
EOSINOPHIL # BLD: 0 K/UL (ref 0–0.8)
EOSINOPHIL NFR BLD: 0 % (ref 0.5–7.8)
ERYTHROCYTE [DISTWIDTH] IN BLOOD BY AUTOMATED COUNT: 12.9 % (ref 11.9–14.6)
GLUCOSE SERPL-MCNC: 125 MG/DL (ref 65–100)
HCT VFR BLD AUTO: 45.8 % (ref 35.8–46.3)
HGB BLD-MCNC: 15 G/DL (ref 11.7–15.4)
IMM GRANULOCYTES # BLD AUTO: 0.1 K/UL (ref 0–0.5)
IMM GRANULOCYTES NFR BLD AUTO: 1 % (ref 0–5)
LYMPHOCYTES # BLD: 1.1 K/UL (ref 0.5–4.6)
LYMPHOCYTES NFR BLD: 7 % (ref 13–44)
MCH RBC QN AUTO: 28.8 PG (ref 26.1–32.9)
MCHC RBC AUTO-ENTMCNC: 32.8 G/DL (ref 31.4–35)
MCV RBC AUTO: 88.1 FL (ref 79.6–97.8)
MONOCYTES # BLD: 0.9 K/UL (ref 0.1–1.3)
MONOCYTES NFR BLD: 6 % (ref 4–12)
NEUTS SEG # BLD: 13.8 K/UL (ref 1.7–8.2)
NEUTS SEG NFR BLD: 86 % (ref 43–78)
NRBC # BLD: 0 K/UL (ref 0–0.2)
P-R INTERVAL, ECG05: 164 MS
PLATELET # BLD AUTO: 302 K/UL (ref 150–450)
PMV BLD AUTO: 9.5 FL (ref 9.4–12.3)
POTASSIUM SERPL-SCNC: 4.5 MMOL/L (ref 3.5–5.1)
Q-T INTERVAL, ECG07: 392 MS
QRS DURATION, ECG06: 72 MS
QTC CALCULATION (BEZET), ECG08: 404 MS
RBC # BLD AUTO: 5.2 M/UL (ref 4.05–5.2)
SODIUM SERPL-SCNC: 136 MMOL/L (ref 136–145)
VENTRICULAR RATE, ECG03: 64 BPM
WBC # BLD AUTO: 16 K/UL (ref 4.3–11.1)

## 2019-07-02 PROCEDURE — 77010033678 HC OXYGEN DAILY

## 2019-07-02 PROCEDURE — 74011250636 HC RX REV CODE- 250/636: Performed by: INTERNAL MEDICINE

## 2019-07-02 PROCEDURE — 74011250637 HC RX REV CODE- 250/637: Performed by: INTERNAL MEDICINE

## 2019-07-02 PROCEDURE — 74011000250 HC RX REV CODE- 250: Performed by: INTERNAL MEDICINE

## 2019-07-02 PROCEDURE — 74011636637 HC RX REV CODE- 636/637: Performed by: INTERNAL MEDICINE

## 2019-07-02 PROCEDURE — 99232 SBSQ HOSP IP/OBS MODERATE 35: CPT | Performed by: INTERNAL MEDICINE

## 2019-07-02 PROCEDURE — 85025 COMPLETE CBC W/AUTO DIFF WBC: CPT

## 2019-07-02 PROCEDURE — 77030012341 HC CHMB SPCR OPTC MDI VYRM -A

## 2019-07-02 PROCEDURE — 74011250637 HC RX REV CODE- 250/637: Performed by: HOSPITALIST

## 2019-07-02 PROCEDURE — 93005 ELECTROCARDIOGRAM TRACING: CPT | Performed by: HOSPITALIST

## 2019-07-02 PROCEDURE — 94760 N-INVAS EAR/PLS OXIMETRY 1: CPT

## 2019-07-02 PROCEDURE — 94761 N-INVAS EAR/PLS OXIMETRY MLT: CPT

## 2019-07-02 PROCEDURE — 94640 AIRWAY INHALATION TREATMENT: CPT

## 2019-07-02 PROCEDURE — 36415 COLL VENOUS BLD VENIPUNCTURE: CPT

## 2019-07-02 PROCEDURE — 65270000029 HC RM PRIVATE

## 2019-07-02 PROCEDURE — 80048 BASIC METABOLIC PNL TOTAL CA: CPT

## 2019-07-02 RX ADMIN — HEPARIN SODIUM 5000 UNITS: 5000 INJECTION INTRAVENOUS; SUBCUTANEOUS at 13:58

## 2019-07-02 RX ADMIN — IPRATROPIUM BROMIDE AND ALBUTEROL SULFATE 3 ML: .5; 3 SOLUTION RESPIRATORY (INHALATION) at 12:05

## 2019-07-02 RX ADMIN — IPRATROPIUM BROMIDE AND ALBUTEROL SULFATE 3 ML: .5; 3 SOLUTION RESPIRATORY (INHALATION) at 19:56

## 2019-07-02 RX ADMIN — HEPARIN SODIUM 5000 UNITS: 5000 INJECTION INTRAVENOUS; SUBCUTANEOUS at 06:47

## 2019-07-02 RX ADMIN — FAMOTIDINE 20 MG: 20 TABLET ORAL at 09:49

## 2019-07-02 RX ADMIN — CITALOPRAM HYDROBROMIDE 20 MG: 20 TABLET ORAL at 09:49

## 2019-07-02 RX ADMIN — BUDESONIDE 500 MCG: 0.5 INHALANT RESPIRATORY (INHALATION) at 08:16

## 2019-07-02 RX ADMIN — BUDESONIDE 500 MCG: 0.5 INHALANT RESPIRATORY (INHALATION) at 19:56

## 2019-07-02 RX ADMIN — CITALOPRAM HYDROBROMIDE 20 MG: 20 TABLET ORAL at 17:39

## 2019-07-02 RX ADMIN — GUAIFENESIN 1200 MG: 600 TABLET, EXTENDED RELEASE ORAL at 09:48

## 2019-07-02 RX ADMIN — Medication 10 ML: at 13:30

## 2019-07-02 RX ADMIN — LEVOFLOXACIN 750 MG: 500 TABLET, FILM COATED ORAL at 11:09

## 2019-07-02 RX ADMIN — Medication 10 ML: at 05:08

## 2019-07-02 RX ADMIN — ASPIRIN 81 MG: 81 TABLET, COATED ORAL at 09:49

## 2019-07-02 RX ADMIN — PREDNISONE 40 MG: 10 TABLET ORAL at 09:49

## 2019-07-02 RX ADMIN — IPRATROPIUM BROMIDE AND ALBUTEROL SULFATE 3 ML: .5; 3 SOLUTION RESPIRATORY (INHALATION) at 15:33

## 2019-07-02 RX ADMIN — FAMOTIDINE 20 MG: 20 TABLET ORAL at 17:39

## 2019-07-02 RX ADMIN — Medication 5 ML: at 21:46

## 2019-07-02 RX ADMIN — IPRATROPIUM BROMIDE AND ALBUTEROL SULFATE 3 ML: .5; 3 SOLUTION RESPIRATORY (INHALATION) at 08:16

## 2019-07-02 RX ADMIN — GUAIFENESIN 1200 MG: 600 TABLET, EXTENDED RELEASE ORAL at 21:45

## 2019-07-02 NOTE — PROGRESS NOTES
Oximetry with Exercise    RA Restin%  RA Ambulated:            84%  1L Ambulated:             87%  2L Ambulated:             88%  3L Ambulated:             90%    Patient required 3L while ambulated and nothing while resting to maintain a SAT at or above 90% SpO2.

## 2019-07-02 NOTE — INTERDISCIPLINARY ROUNDS
Interdisciplinary team rounds were held 7/2/2019 with the following team members:Care Management, Physical Therapy, Physician and Clinical Coordinator and the patient. Plan of care discussed. See clinical pathway and/or care plan for interventions and desired outcomes.

## 2019-07-02 NOTE — PROGRESS NOTES
Follow up with pt this afternoon about referral received for home O2. Called/faxed referral to [facility] and a portable tank has been delivered to pt in anticipation of her discharge home tomorrow. No other needs known at this time.

## 2019-07-02 NOTE — PROGRESS NOTES
Patient continues to rest quietly, eyes closed, oxygen at 2 liters, with no s/s of distress. Respirations easy and unlabored. Call light in reach.

## 2019-07-02 NOTE — PROGRESS NOTES
Patient resting quietly in bed, continues on 2 liters oxygen. No c/o discomfort, call light in reach, will monitor.

## 2019-07-02 NOTE — PROGRESS NOTES
Georgina Cervantes  Admission Date: 6/30/2019             Daily Progress Note: 7/2/2019    The patient's chart is reviewed and the patient is discussed with the staff. Patient is a 81 y.o.  female seen and evaluated at the request of Dr. Sanaz Hill. She is a 82 y/o female with history of COPD, tobacco abuse, polycythemia, obesity, depression, admitted yesterday with AECOPD and acute hypoxemic respiratory failure. In ED she had a negative CXR, failed to resolve with nebulizers, steroids and was hypoxemic to 81% on RA and was admitted by hospitalist service. She has not previously seen pulmonary anywhere for these issues. She reports improved symptoms since yesterday, but still has some wheezing, cough and shortness of breath (improving). We were consulted for these findings and lack of prior pulmonary evaluation.     Reports has been sick for about a month, got steroids and antibiotics about 2 weeks ago from [facility] and felt a little better, but then worsened over the past week and couldn't breath yesterday. States has not had PFTs for years, but did see pulmonary perhapse 20 years ago and has done PFTs though [facility] COPD research program, but doesn't remember any of these results. She stopped smoking in Feb 2019 and hasn't smoked a cigarette since. She was switched to [facility] a few months ago after switching Advair to Symbicort and having upper airway irritation. She has never had difficulty with albuterol or had arrhythmias with it. She does not remember ever taking Spiriva or Incruse and doesn't know what it is. She reports normally she can walk on flat ground without significant limiting dyspnea, but is limited with walking up 1 flight of stairs for many years. She was on O2 in the distant past, but slowly weaned off and hasn't used for nearly 20 years. She thinks she has had 3-4 courses of steroids/antibiotics in 2019, perhaps 3 in 2018, but hasn't been hospitalized for many years.    Polycythemia has been evaluated by hematology, felt secondary to smoking, carbonmonoxide level was 10.5% and Jak2 was negative. Polycythemia resolved during re-evaluation after stopping smoking, however she has restarted smoking. Subjective:     Feels better. Has been able to walk around room. Wants to go home. Feels less tight, easier to breath, coughing is improved and less productive. No fevers. Wheezing is still present, but less. Feels she can get around well enough to take care of herself and wants to get home to take care of her mother and dogs.     Current Facility-Administered Medications   Medication Dose Route Frequency    guaiFENesin ER (MUCINEX) tablet 1,200 mg  1,200 mg Oral Q12H    citalopram (CELEXA) tablet 20 mg  20 mg Oral BID    levoFLOXacin (LEVAQUIN) tablet 750 mg  750 mg Oral Q24H    albuterol-ipratropium (DUO-NEB) 2.5 MG-0.5 MG/3 ML  3 mL Nebulization QID RT    predniSONE (DELTASONE) tablet 40 mg  40 mg Oral DAILY WITH BREAKFAST    aspirin delayed-release tablet 81 mg  81 mg Oral DAILY    sodium chloride (NS) flush 5-40 mL  5-40 mL IntraVENous Q8H    sodium chloride (NS) flush 5-40 mL  5-40 mL IntraVENous PRN    acetaminophen (TYLENOL) tablet 650 mg  650 mg Oral Q6H PRN    HYDROcodone-acetaminophen (NORCO) 5-325 mg per tablet 1 Tab  1 Tab Oral Q4H PRN    ondansetron (ZOFRAN) injection 4 mg  4 mg IntraVENous Q4H PRN    heparin (porcine) injection 5,000 Units  5,000 Units SubCUTAneous Q8H    budesonide (PULMICORT) 500 mcg/2 ml nebulizer suspension  500 mcg Nebulization BID RT    famotidine (PEPCID) tablet 20 mg  20 mg Oral BID     Review of Systems  Constitutional: negative for fever, chills, sweats  Cardiovascular: negative for chest pain, palpitations, syncope, edema  Gastrointestinal:  negative for dysphagia, reflux, vomiting, diarrhea, abdominal pain, or melena  Neurologic:  negative for focal weakness, numbness, headache    Objective:     Vitals:    07/01/19 1915 07/01/19 2301 07/02/19 0312 07/02/19 0738   BP: 118/75 115/68 119/73 124/68   Pulse: 86 71 65 92   Resp: 22 20 18 18   Temp: 97.8 °F (36.6 °C) 97.4 °F (36.3 °C) 97.3 °F (36.3 °C) 97.6 °F (36.4 °C)   SpO2: 90% 92% 94% 90%   Weight:       Height:         Intake and Output:   06/30 1901 - 07/02 0700  In: 2560 [P.O.:2560]  Out: 5600 [Urine:5600]  No intake/output data recorded. Physical Exam:   Constitution:  the patient is well developed and in no acute distress  EENMT:  Sclera clear, pupils equal, oral mucosa moist  Respiratory: expiratory wheezing, but less, good air movement, speaking in full sentences  Cardiovascular:  RRR without M,G,R  Gastrointestinal: soft and non-tender; with positive bowel sounds. Musculoskeletal: warm without cyanosis. There is no lower extremity edema. Skin:  no jaundice or rashes, no wounds   Neurologic: no gross neuro deficits     Psychiatric:  alert and oriented x 4    CXR: none new    LAB  No results for input(s): GLUCPOC in the last 72 hours. No lab exists for component: [place]   Recent Labs     07/01/19  0554 06/30/19  1042   WBC 4.6 6.2   HGB 13.4 14.5   HCT 41.5 45.0    246     Recent Labs     07/01/19  0554 06/30/19  1042   * 138   K 4.1 4.2   CL 97* 102   CO2 29 30   * 104*   BUN 11 6*   CREA 0.84 0.71   CA 8.9 9.1   ALB  --  3.6   TBILI  --  0.4   ALT  --  20   SGOT  --  14*     Recent Labs     06/30/19  1136   PHI 7.350   PCO2I 48.7*   PO2I 100   HCO3I 26.9*     No results for input(s): LCAD, LAC in the last 72 hours.     Assessment:  (Medical Decision Making)     Hospital Problems  Date Reviewed: 3/6/2019          Codes Class Noted POA    * (Principal) Acute respiratory failure with hypoxia ([facility] [address] ICD-10-CM: J96.01  ICD-9-CM: 518.81  6/30/2019 Yes        Tobacco use (Chronic) ICD-10-CM: Z72.0  ICD-9-CM: 305.1  6/30/2019 Yes        COPD exacerbation ([facility] ICD-10-CM: J44.1  ICD-9-CM: 491.21  6/30/2019 Yes        Obesity (Chronic) ICD-10-CM: E66.9  ICD-9-CM: 278.00  6/30/2019 Yes        Major depressive disorder ICD-10-CM: F32.9  ICD-9-CM: 296.20  6/10/2018 Yes        Polycythemia ICD-10-CM: D75.1  ICD-9-CM: 238.4  6/10/2018 Yes              Plan:  (Medical Decision Making)     --Could discharge from my perspective if needs are met (O2)  --will request RT to ambulate and may need short term oxygen at home  --would discharge with prednisone 40mg orally x 7 days, then 20mg x 7 days  --would discharge with po levaquin (total 5 day course)  --when discharged would change Breo to Trelegy (add LAMA) and continue albuterol PRN  --she should follow up in 2-4 weeks for spirometry and outpatient evaluation of presumed COPD  --continue smoking cessation  --will evaluate with C PFTs and ambulatory oxygen re-evaluation as outpatient in 4-8 weeks after recovers from acute exacerbation    More than 50% of the time documented was spent in face-to-face contact with the patient and in the care of the patient on the floor/unit where the patient is located.     Ifrah Engle MD

## 2019-07-02 NOTE — PROGRESS NOTES
SBAR shift report received from Friday [facility]. Pt stable. Assessment complete. Pt is sitting up on side of bed. Resp even, unlabored. Pt is alert, orient X 4. Pt appears in no acute distress at this time. Pt is on 2L nasal cannula 02. Pt voices no complaints or concerns at this time. Pt still having shortness of breath with exertion and dry, hacking cough. Pt encouraged to call for assistance, call light in reach. Safety measures in place.  Will continue to monitor

## 2019-07-02 NOTE — PROGRESS NOTES
Pt sitting up on side of bed, watching tv. Pt voices no complaints or concerns at this time. Pt appears in no acute distress on 2L nasal cannula. SBAR end of shift report given to oncoming RN.

## 2019-07-02 NOTE — PROGRESS NOTES
Patient sitting on side of her bed, using cell phone. No c/o discomfort at this time, noted wheezing respirations. States she is much better today and wanting to go home. Will monitor.

## 2019-07-02 NOTE — PROGRESS NOTES
Hospitalist Progress Note    Patient: Nicole Krueger MRN: [id number]  SSN: [id number]    YOB: 1950  Age: 81 y.o. Sex: female      Admit Date: 6/30/2019    LOS: 2 days     Subjective:     81year old CF admitted for COPD exacerbation    7/2 - She feels better today. Still wheezing. Denies SOB but had some LOPEZ when she went to the rest room. Review of systems negative except stated above. Objective:     Visit Vitals  /70   Pulse 72   Temp 97.6 °F (36.4 °C)   Resp 18   Ht 5' 4\" (1.626 m)   Wt 95.5 kg (210 lb 8.6 oz)   SpO2 93%   BMI 36.14 kg/m²      Oxygen Therapy  O2 Sat (%): 93 % (07/02/19 1206)  Pulse via Oximetry: 69 beats per minute (07/02/19 1206)  O2 Device: Nasal cannula (07/02/19 1206)  O2 Flow Rate (L/min): 2 l/min (07/02/19 1206)  FIO2 (%): 24 % (07/01/19 1900)      Intake and Output:     Intake/Output Summary (Last 24 hours) at 7/2/2019 1422  Last data filed at 7/2/2019 1102  Gross per 24 hour   Intake 1480 ml   Output 3300 ml   Net -1820 ml         Physical Exam:   GENERAL: alert, cooperative, no distress, appears stated age  EYE: conjunctivae/corneas clear. PERRL. THROAT & NECK: normal and no erythema or exudates noted. LUNG: Diffuse exp wheeze  HEART: regular rate and rhythm, S1S2, no murmur, no JVD  ABDOMEN: soft, non-tender, non-distended. Bowel sounds normal.   EXTREMITIES:  No edema, 2+ pedal/radial pulses bilaterally  SKIN: no rash or abnormalities  NEUROLOGIC: A&Ox3. Cranial nerves 2-12 grossly intact.     Lab/Data Review:  Recent Results (from the past 24 hour(s))   EKG, 12 LEAD, INITIAL    Collection Time: 07/02/19  6:19 AM   Result Value Ref Range    Ventricular Rate 64 BPM    Atrial Rate 64 BPM    P-R Interval 164 ms    QRS Duration 72 ms    Q-T Interval 392 ms    QTC Calculation (Bezet) 404 ms    Calculated P Axis 75 degrees    Calculated R Axis 79 degrees    Calculated T Axis 67 degrees    Diagnosis       Sinus rhythm with marked sinus arrhythmia  Otherwise normal ECG  When compared with ECG of 30-JUN-2019 10:39,  Premature supraventricular complexes are no longer Present  Confirmed by [facility]  MD ()TRAN ([id number]) on 7/2/2019 6:65:48 AM     METABOLIC PANEL, BASIC    Collection Time: 07/02/19  8:11 AM   Result Value Ref Range    Sodium 136 136 - 145 mmol/L    Potassium 4.5 3.5 - 5.1 mmol/L    Chloride 100 98 - 107 mmol/L    CO2 29 21 - 32 mmol/L    Anion gap 7 7 - 16 mmol/L    Glucose 125 (H) 65 - 100 mg/dL    BUN 14 8 - 23 MG/DL    Creatinine 0.84 0.6 - 1.0 MG/DL    GFR est AA >60 >60 ml/min/1.73m2    GFR est non-AA >60 >60 ml/min/1.73m2    Calcium 9.5 8.3 - 10.4 MG/DL   CBC WITH AUTOMATED DIFF    Collection Time: 07/02/19  8:11 AM   Result Value Ref Range    WBC 16.0 (H) 4.3 - 11.1 K/uL    RBC 5.20 4.05 - 5.2 M/uL    HGB 15.0 11.7 - 15.4 g/dL    HCT 45.8 35.8 - 46.3 %    MCV 88.1 79.6 - 97.8 FL    MCH 28.8 26.1 - 32.9 PG    MCHC 32.8 31.4 - 35.0 g/dL    RDW 12.9 11.9 - 14.6 %    PLATELET 504 318 - 474 K/uL    MPV 9.5 9.4 - 12.3 FL    ABSOLUTE NRBC 0.00 0.0 - 0.2 K/uL    DF AUTOMATED      NEUTROPHILS 86 (H) 43 - 78 %    LYMPHOCYTES 7 (L) 13 - 44 %    MONOCYTES 6 4.0 - 12.0 %    EOSINOPHILS 0 (L) 0.5 - 7.8 %    BASOPHILS 0 0.0 - 2.0 %    IMMATURE GRANULOCYTES 1 0.0 - 5.0 %    ABS. NEUTROPHILS 13.8 (H) 1.7 - 8.2 K/UL    ABS. LYMPHOCYTES 1.1 0.5 - 4.6 K/UL    ABS. MONOCYTES 0.9 0.1 - 1.3 K/UL    ABS. EOSINOPHILS 0.0 0.0 - 0.8 K/UL    ABS. BASOPHILS 0.0 0.0 - 0.2 K/UL    ABS. IMM. GRANS. 0.1 0.0 - 0.5 K/UL       Imaging:  Xr Chest Pa Lat    Result Date: 6/30/2019  CHEST X-RAY, 2 views. HISTORY:  Cough. TECHNIQUE: PA and lateral views. COMPARISON: None. FINDINGS: The lungs are hyperinflated and clear. The heart size is normal. The costophrenic angles are sharp. The pulmonary vasculature is unremarkable. Included portion of the upper abdomen is unremarkable. Mild degenerative changes thoracic spine. IMPRESSION: Negative for acute abnormality. No results found for this visit on 06/30/19. Cultures:   All Micro Results     None          Assessment/Plan:     Principal Problem:    Acute respiratory failure with hypoxia ([address] (6/30/2019)  - Due to COPD Exacerbation  - Will need home oxygen  - Continue steroids  - Continue aerosols    Active Problems:    COPD exacerbation ([address] (6/30/2019)  - Still with wheeze, but improved  - Continue aerosols  - Continue Predinsone  - Continue Levaquin      Polycythemia (6/10/2018)  - Will need OP follow up      Essential hypertension (6/10/2018)  - Stable      Tobacco use (6/30/2019)      Major depressive disorder (6/10/2018)  - Continue Citalopram      GERD (gastroesophageal reflux disease) (6/10/2018)  - Continue Pepcid      Obesity (6/30/2019)    Dispo - Discharge tomorrow AM with oxygen    DIET CARDIAC Regular    DVT Prophylaxis: Heparin      Signed By: Nessa Dowling DO     July 2, 2019

## 2019-07-02 NOTE — PROGRESS NOTES
Patient awake, up to side of bed and coughing with harsh, dry, non productive cough. No c/o pain at this time. Ambulation steady, dyspneic on exertion. Will monitor.

## 2019-07-03 VITALS
OXYGEN SATURATION: 91 % | WEIGHT: 210.54 LBS | BODY MASS INDEX: 35.94 KG/M2 | HEIGHT: 64 IN | RESPIRATION RATE: 18 BRPM | HEART RATE: 75 BPM | SYSTOLIC BLOOD PRESSURE: 121 MMHG | DIASTOLIC BLOOD PRESSURE: 77 MMHG | TEMPERATURE: 97.5 F

## 2019-07-03 LAB
ANION GAP SERPL CALC-SCNC: 6 MMOL/L (ref 7–16)
BASOPHILS # BLD: 0 K/UL (ref 0–0.2)
BASOPHILS NFR BLD: 0 % (ref 0–2)
BUN SERPL-MCNC: 17 MG/DL (ref 8–23)
CALCIUM SERPL-MCNC: 9.4 MG/DL (ref 8.3–10.4)
CHLORIDE SERPL-SCNC: 99 MMOL/L (ref 98–107)
CO2 SERPL-SCNC: 33 MMOL/L (ref 21–32)
CREAT SERPL-MCNC: 0.9 MG/DL (ref 0.6–1)
DIFFERENTIAL METHOD BLD: ABNORMAL
EOSINOPHIL # BLD: 0 K/UL (ref 0–0.8)
EOSINOPHIL NFR BLD: 0 % (ref 0.5–7.8)
ERYTHROCYTE [DISTWIDTH] IN BLOOD BY AUTOMATED COUNT: 13.3 % (ref 11.9–14.6)
GLUCOSE SERPL-MCNC: 84 MG/DL (ref 65–100)
HCT VFR BLD AUTO: 48.4 % (ref 35.8–46.3)
HGB BLD-MCNC: 15.8 G/DL (ref 11.7–15.4)
IMM GRANULOCYTES # BLD AUTO: 0.1 K/UL (ref 0–0.5)
IMM GRANULOCYTES NFR BLD AUTO: 1 % (ref 0–5)
LYMPHOCYTES # BLD: 2.6 K/UL (ref 0.5–4.6)
LYMPHOCYTES NFR BLD: 20 % (ref 13–44)
MCH RBC QN AUTO: 28.9 PG (ref 26.1–32.9)
MCHC RBC AUTO-ENTMCNC: 32.6 G/DL (ref 31.4–35)
MCV RBC AUTO: 88.6 FL (ref 79.6–97.8)
MONOCYTES # BLD: 0.9 K/UL (ref 0.1–1.3)
MONOCYTES NFR BLD: 7 % (ref 4–12)
NEUTS SEG # BLD: 9 K/UL (ref 1.7–8.2)
NEUTS SEG NFR BLD: 71 % (ref 43–78)
NRBC # BLD: 0 K/UL (ref 0–0.2)
PLATELET # BLD AUTO: 338 K/UL (ref 150–450)
PMV BLD AUTO: 9.1 FL (ref 9.4–12.3)
POTASSIUM SERPL-SCNC: 4.2 MMOL/L (ref 3.5–5.1)
RBC # BLD AUTO: 5.46 M/UL (ref 4.05–5.2)
SODIUM SERPL-SCNC: 138 MMOL/L (ref 136–145)
WBC # BLD AUTO: 12.6 K/UL (ref 4.3–11.1)

## 2019-07-03 PROCEDURE — 80048 BASIC METABOLIC PNL TOTAL CA: CPT

## 2019-07-03 PROCEDURE — 94760 N-INVAS EAR/PLS OXIMETRY 1: CPT

## 2019-07-03 PROCEDURE — 74011250637 HC RX REV CODE- 250/637: Performed by: HOSPITALIST

## 2019-07-03 PROCEDURE — 85025 COMPLETE CBC W/AUTO DIFF WBC: CPT

## 2019-07-03 PROCEDURE — 74011636637 HC RX REV CODE- 636/637: Performed by: INTERNAL MEDICINE

## 2019-07-03 PROCEDURE — 99232 SBSQ HOSP IP/OBS MODERATE 35: CPT | Performed by: INTERNAL MEDICINE

## 2019-07-03 PROCEDURE — 74011250637 HC RX REV CODE- 250/637: Performed by: INTERNAL MEDICINE

## 2019-07-03 PROCEDURE — 77010033678 HC OXYGEN DAILY

## 2019-07-03 PROCEDURE — 36415 COLL VENOUS BLD VENIPUNCTURE: CPT

## 2019-07-03 PROCEDURE — 74011000250 HC RX REV CODE- 250: Performed by: INTERNAL MEDICINE

## 2019-07-03 PROCEDURE — 94640 AIRWAY INHALATION TREATMENT: CPT

## 2019-07-03 RX ORDER — LEVOFLOXACIN 750 MG/1
750 TABLET ORAL EVERY 24 HOURS
Qty: 4 TAB | Refills: 0 | Status: SHIPPED | OUTPATIENT
Start: 2019-07-03 | End: 2019-07-07

## 2019-07-03 RX ORDER — PREDNISONE 10 MG/1
40 TABLET ORAL
Qty: 27 TAB | Refills: 0 | Status: SHIPPED | OUTPATIENT
Start: 2019-07-03 | End: 2019-07-16

## 2019-07-03 RX ADMIN — GUAIFENESIN 1200 MG: 600 TABLET, EXTENDED RELEASE ORAL at 08:31

## 2019-07-03 RX ADMIN — ASPIRIN 81 MG: 81 TABLET, COATED ORAL at 08:31

## 2019-07-03 RX ADMIN — Medication 5 ML: at 06:24

## 2019-07-03 RX ADMIN — BUDESONIDE 500 MCG: 0.5 INHALANT RESPIRATORY (INHALATION) at 07:18

## 2019-07-03 RX ADMIN — LEVOFLOXACIN 750 MG: 500 TABLET, FILM COATED ORAL at 10:16

## 2019-07-03 RX ADMIN — IPRATROPIUM BROMIDE AND ALBUTEROL SULFATE 3 ML: .5; 3 SOLUTION RESPIRATORY (INHALATION) at 07:18

## 2019-07-03 RX ADMIN — CITALOPRAM HYDROBROMIDE 20 MG: 20 TABLET ORAL at 08:31

## 2019-07-03 RX ADMIN — PREDNISONE 40 MG: 10 TABLET ORAL at 08:31

## 2019-07-03 RX ADMIN — FAMOTIDINE 20 MG: 20 TABLET ORAL at 08:31

## 2019-07-03 NOTE — DISCHARGE INSTRUCTIONS
Patient Education        Chronic Obstructive Pulmonary Disease (COPD): Care Instructions  Your Care Instructions    Chronic obstructive pulmonary disease (COPD) is a general term for a group of lung diseases, including emphysema and chronic bronchitis. People with COPD have decreased airflow in and out of the lungs, which makes it hard to breathe. The airways also can get clogged with thick mucus. Cigarette smoking is a major cause of COPD. Although there is no cure for COPD, you can slow its progress. Following your treatment plan and taking care of yourself can help you feel better and live longer. Follow-up care is a key part of your treatment and safety. Be sure to make and go to all appointments, and call your doctor if you are having problems. It's also a good idea to know your test results and keep a list of the medicines you take. How can you care for yourself at home?   Staying healthy    · Do not smoke. This is the most important step you can take to prevent more damage to your lungs. If you need help quitting, talk to your doctor about stop-smoking programs and medicines. These can increase your chances of quitting for good.     · Avoid colds and flu. Get a pneumococcal vaccine shot. If you have had one before, ask your doctor whether you need a second dose. Get the flu vaccine every fall. If you must be around people with colds or the flu, wash your hands often.     · Avoid secondhand smoke, air pollution, and high altitudes. Also avoid cold, dry air and hot, humid air. Stay at home with your windows closed when air pollution is bad.    Medicines and oxygen therapy    · Take your medicines exactly as prescribed. Call your doctor if you think you are having a problem with your medicine.     · You may be taking medicines such as:  ? Bronchodilators. These help open your airways and make breathing easier. Bronchodilators are either short-acting (work for 6 to 9 hours) or long-acting (work for 24 hours). You inhale most bronchodilators, so they start to act quickly. Always carry your quick-relief inhaler with you in case you need it while you are away from home. ? Corticosteroids (prednisone, budesonide). These reduce airway inflammation. They come in pill or inhaled form. You must take these medicines every day for them to work well.     · A spacer may help you get more inhaled medicine to your lungs. Ask your doctor or pharmacist if a spacer is right for you. If it is, ask how to use it properly.     · Do not take any vitamins, over-the-counter medicine, or herbal products without talking to your doctor first.     · If your doctor prescribed antibiotics, take them as directed. Do not stop taking them just because you feel better. You need to take the full course of antibiotics.     · Oxygen therapy boosts the amount of oxygen in your blood and helps you breathe easier. Use the flow rate your doctor has recommended, and do not change it without talking to your doctor first.   Activity    · Get regular exercise. Walking is an easy way to get exercise. Start out slowly, and walk a little more each day.     · Pay attention to your breathing. You are exercising too hard if you cannot talk while you are exercising.     · Take short rest breaks when doing household chores and other activities.     · Learn breathing methods--such as breathing through pursed lips--to help you become less short of breath.     · If your doctor has not set you up with a pulmonary rehabilitation program, talk to him or her about whether rehab is right for you. Rehab includes exercise programs, education about your disease and how to manage it, help with diet and other changes, and emotional support. Diet    · Eat regular, healthy meals. Use bronchodilators about 1 hour before you eat to make it easier to eat. Eat several small meals instead of three large ones.  Drink beverages at the end of the meal. Avoid foods that are hard to chew.     · Eat foods that contain protein so that you do not lose muscle mass.     · Talk with your doctor if you gain too much weight or if you lose weight without trying.    Mental health    · Talk to your family, friends, or a therapist about your feelings. It is normal to feel frightened, angry, hopeless, helpless, and even guilty. Talking openly about bad feelings can help you cope. If these feelings last, talk to your doctor. When should you call for help? Call 911 anytime you think you may need emergency care. For example, call if:    · You have severe trouble breathing.    Call your doctor now or seek immediate medical care if:    · You have new or worse trouble breathing.     · You cough up blood.     · You have a fever.    Watch closely for changes in your health, and be sure to contact your doctor if:    · You cough more deeply or more often, especially if you notice more mucus or a change in the color of your mucus.     · You have new or worse swelling in your legs or belly.     · You are not getting better as expected. Where can you learn more? Go to http://phi-dev.info/. Estrada Parks in the search box to learn more about \"Chronic Obstructive Pulmonary Disease (COPD): Care Instructions. \"  Current as of: September 5, 2018  Content Version: 11.9  © 2793-7758 [employer]. Care instructions adapted under license by [employer] (which disclaims liability or warranty for this information). If you have questions about a medical condition or this instruction, always ask your healthcare professional. [place] [address] any warranty or liability for your use of this information.   Patient Education        Oxygen Therapy: Care Instructions  Your Care Instructions    Oxygen therapy helps you get more oxygen into your lungs and bloodstream. You may use it if you have a disease that makes it hard to breathe, such as COPD, pulmonary fibrosis (scarring of the lungs), or heart failure. Oxygen therapy can make it easier for you to breathe and can reduce your heart's workload. Some people need extra oxygen all the time. Others need it from time to time throughout the day or overnight. A doctor will prescribe how much oxygen you need and how often to use it. To breathe the oxygen, most people use a nasal cannula (say \"CRYS-yuh-casey\"). This is a thin tube with two prongs that fit just inside your nose. People who need a lot of oxygen may need to use a mask that fits over the nose and mouth. Follow-up care is a key part of your treatment and safety. Be sure to make and go to all appointments, and call your doctor if you are having problems. It's also a good idea to know your test results and keep a list of the medicines you take. How can you care for yourself at home? To help yourself  · Using oxygen may dry out your nose or lips. Use water-based lubricants on your lips or nostrils. Do not use an oil-based product like petroleum jelly. · If you use a nasal cannula, the tubing may rub under your nostrils and around your ears. To keep your skin from getting sore, tuck some gauze under the tubing. Use a water-based lotion on rubbed areas. · Do not use alcohol or take drugs that relax you, because they will slow your breathing rate. · Keep track of how much oxygen is in the tank, and reorder before it runs out. If a holiday is coming up or you expect bad weather, order in advance or make your regular order larger. · You may need extra oxygen when you travel to high altitudes or travel by plane. Ask your doctor about this. · If you are getting oxygen directly to your windpipe through an opening in your neck, your doctor will teach you how to care for the equipment. To make sure oxygen is flowing  · Check the flow by holding your mask or cannula up to your ear and listening for the \"hiss\" of airflow. · If you have a nasal cannula, dip the prongs in a glass of water.  If you see bubbles, oxygen is coming through. · Check your pressure gauge or contents indicator. · If you use an oxygen concentrator, make sure it is turned on and plugged in. If you use a cylinder, make sure the valve is open. · Look for kinks, blockages, or water in the tubing. Be sure the tubing is connected to the oxygen source. · Do not change your oxygen flow rate. Your doctor sets this at the correct level. Higher flow rates usually do not help and can increase the risk of harmful carbon dioxide buildup in the blood. To be safe  · Do not leave cords or tubing running across an area where you or someone else may trip on it. · Do not let oxygen containers get hot. Store them in a cool place where there is airflow. Do not leave them in a car trunk or a hot vehicle. · Keep oxygen containers upright. Make sure they do not fall over and get damaged. Try securing the tanks in a sturdy container or securing them with a rope or a chain. · Watch for signs of oxygen leaks. If you hear a loud hissing from your container or if it empties too fast, stay away from the container. Open windows right away and call the company that brought the oxygen system to your home. · Do not use oxygen around anything that could spark or easily cause a fire. ? Do not smoke or let others smoke while you are using oxygen. Put up \"no smoking\" signs in your home. ? Do not use oxygen near open flames, such as candles, fireplaces, gas stoves, or hot water heaters. Do not use it near electric razors, hair dryers, heating pads, or anything that may spark. ? Keep a working fire extinguisher in your home where it is easy to get to.  ? If a fire starts, turn off the oxygen right away and leave the house. ? If you have an oxygen concentrator, do not use it if the cord looks damaged. Do not use an extension cord to plug it in. Do not plug it into an outlet that has other appliances plugged into it.   To care for the equipment  · Follow the directions that come with the equipment for using and caring for it. · Wash your cannula or mask with a liquid soap and warm water 1 or 2 times a week. Replace them every 2 to 4 weeks. · If you have a cold, change the nasal prongs when your cold symptoms are done. · If you have an oxygen concentrator, unplug the unit and wipe down the cabinet with a damp cloth daily. Clean the air filter at least 2 times a week. Where can you learn more? Go to http://phi-dev.info/. Enter E117 in the search box to learn more about \"Oxygen Therapy: Care Instructions. \"  Current as of: September 5, 2018  Content Version: 11.9  © 9099-3965 [employer]. Care instructions adapted under license by [employer] (which disclaims liability or warranty for this information). If you have questions about a medical condition or this instruction, always ask your healthcare professional. [place] [address] any warranty or liability for your use of this information. DISCHARGE SUMMARY from Nurse    PATIENT INSTRUCTIONS:    After general anesthesia or intravenous sedation, for 24 hours or while taking prescription Narcotics:  · Limit your activities  · Do not drive and operate hazardous machinery  · Do not make important personal or business decisions  · Do  not drink alcoholic beverages  · If you have not urinated within 8 hours after discharge, please contact your surgeon on call.     Report the following to your surgeon:  · Excessive pain, swelling, redness or odor of or around the surgical area  · Temperature over 100.5  · Nausea and vomiting lasting longer than 4 hours or if unable to take medications  · Any signs of decreased circulation or nerve impairment to extremity: change in color, persistent  numbness, tingling, coldness or increase pain  · Any questions    What to do at Home:    *  Please give a list of your current medications to your Primary Care Provider. *  Please update this list whenever your medications are discontinued, doses are      changed, or new medications (including over-the-counter products) are added. *  Please carry medication information at all times in case of emergency situations. These are general instructions for a healthy lifestyle:    No smoking/ No tobacco products/ Avoid exposure to second hand smoke  Surgeon General's Warning:  Quitting smoking now greatly reduces serious risk to your health. Obesity, smoking, and sedentary lifestyle greatly increases your risk for illness    A healthy diet, regular physical exercise & weight monitoring are important for maintaining a healthy lifestyle    You may be retaining fluid if you have a history of heart failure or if you experience any of the following symptoms:  Weight gain of 3 pounds or more overnight or 5 pounds in a week, increased swelling in our hands or feet or shortness of breath while lying flat in bed. Please call your doctor as soon as you notice any of these symptoms; do not wait until your next office visit. The discharge information has been reviewed with the patient. The patient verbalized understanding. Discharge medications reviewed with the patient and appropriate educational materials and side effects teaching were provided.   ___________________________________________________________________________________________________________________________________

## 2019-07-03 NOTE — PROGRESS NOTES
[facility] Pt [facility]. Assessment complete. Pt is sitting up on side of bed. Resp even, unlabored. Pt is alert, orient X 4. Pt appears in no acute distress at this time. Pt is on 2L nasal cannula 02. Pt voices complaints of shortness of breath due to oxygen being weaned through the night. Pt encouraged to call for assistance, call light in reach. Safety measures in place.  Will continue to monitor

## 2019-07-03 NOTE — PROGRESS NOTES
Received bedside shift report from off going nurse, Denita. Patient sitting up on the side of the bed, watching tv. Respirations even and unlabored on 2 L via nasal cannula. Bed low and locked. Bedside table, personal belongings, and call light all within reach. Denies needs at this time.

## 2019-07-03 NOTE — PROGRESS NOTES
Hui Mendez  Admission Date: 6/30/2019             Daily Progress Note: 7/3/2019    The patient's chart is reviewed and the patient is discussed with the staff. Patient is a 81 y.o.  female seen and evaluated at the request of Dr. Keturah Mancia. She is a 82 y/o female with history of COPD, tobacco abuse, polycythemia, obesity, depression, admitted yesterday with AECOPD and acute hypoxemic respiratory failure. In ED she had a negative CXR, failed to resolve with nebulizers, steroids and was hypoxemic to 81% on RA and was admitted by hospitalist service. She has not previously seen pulmonary anywhere for these issues. She reports improved symptoms since yesterday, but still has some wheezing, cough and shortness of breath (improving). We were consulted for these findings and lack of prior pulmonary evaluation.     Reports has been sick for about a month, got steroids and antibiotics about 2 weeks ago from [facility] and felt a little better, but then worsened over the past week and couldn't breath yesterday. States has not had PFTs for years, but did see pulmonary perhapse 20 years ago and has done PFTs though [facility] COPD research program, but doesn't remember any of these results. She stopped smoking in Feb 2019 and hasn't smoked a cigarette since. She was switched to [facility] a few months ago after switching Advair to Symbicort and having upper airway irritation. She has never had difficulty with albuterol or had arrhythmias with it. She does not remember ever taking Spiriva or Incruse and doesn't know what it is. She reports normally she can walk on flat ground without significant limiting dyspnea, but is limited with walking up 1 flight of stairs for many years. She was on O2 in the distant past, but slowly weaned off and hasn't used for nearly 20 years. She thinks she has had 3-4 courses of steroids/antibiotics in 2019, perhaps 3 in 2018, but hasn't been hospitalized for many years.    Polycythemia has been evaluated by hematology, felt secondary to smoking, carbonmonoxide level was 10.5% and Jak2 was negative. Polycythemia resolved during re-evaluation after stopping smoking, however she has restarted smoking. Subjective:     Patient seen sitting on side of bed today, feels \"good\" and states being ready to go home. Discussed discharge plan with patient who is agreeable.       Current Facility-Administered Medications   Medication Dose Route Frequency    guaiFENesin ER (MUCINEX) tablet 1,200 mg  1,200 mg Oral Q12H    citalopram (CELEXA) tablet 20 mg  20 mg Oral BID    levoFLOXacin (LEVAQUIN) tablet 750 mg  750 mg Oral Q24H    albuterol-ipratropium (DUO-NEB) 2.5 MG-0.5 MG/3 ML  3 mL Nebulization QID RT    predniSONE (DELTASONE) tablet 40 mg  40 mg Oral DAILY WITH BREAKFAST    aspirin delayed-release tablet 81 mg  81 mg Oral DAILY    sodium chloride (NS) flush 5-40 mL  5-40 mL IntraVENous Q8H    sodium chloride (NS) flush 5-40 mL  5-40 mL IntraVENous PRN    acetaminophen (TYLENOL) tablet 650 mg  650 mg Oral Q6H PRN    HYDROcodone-acetaminophen (NORCO) 5-325 mg per tablet 1 Tab  1 Tab Oral Q4H PRN    ondansetron (ZOFRAN) injection 4 mg  4 mg IntraVENous Q4H PRN    heparin (porcine) injection 5,000 Units  5,000 Units SubCUTAneous Q8H    budesonide (PULMICORT) 500 mcg/2 ml nebulizer suspension  500 mcg Nebulization BID RT    famotidine (PEPCID) tablet 20 mg  20 mg Oral BID     Review of Systems  Constitutional: negative for fever, chills, sweats  Cardiovascular: negative for chest pain, palpitations, syncope, edema  Gastrointestinal:  negative for dysphagia, reflux, vomiting, diarrhea, abdominal pain, or melena  Neurologic:  negative for focal weakness, numbness, headache    Objective:     Vitals:    07/02/19 2255 07/03/19 0308 07/03/19 0718 07/03/19 0723   BP: 103/67 104/59  121/77   Pulse: 74 66  75   Resp: 18 18  18   Temp: 97.6 °F (36.4 °C) 97.8 °F (36.6 °C)  97.5 °F (36.4 °C) SpO2: 94% 94% 97% 91%   Weight:       Height:         Intake and Output:   07/01 1901 - 07/03 0700  In: 6895 [P.O.:1560]  Out: 4000 [Urine:4000]  No intake/output data recorded. Physical Exam:   Constitution:  the patient is well developed and in no acute distress  EENMT:  Sclera clear, pupils equal, oral mucosa moist  Respiratory: expiratory wheezing throughout  Cardiovascular:  RRR without M,G,R  Gastrointestinal: soft and non-tender; with positive bowel sounds. Musculoskeletal: warm without cyanosis. There is no lower extremity edema. Skin:  no jaundice or rashes, no wounds   Neurologic: no gross neuro deficits     Psychiatric:  alert and oriented x 4, following commands    CXR: None today    LAB  No results for input(s): GLUCPOC in the last 72 hours. No lab exists for component: [place]   Recent Labs     07/02/19  0811 07/01/19  0554 06/30/19  1042   WBC 16.0* 4.6 6.2   HGB 15.0 13.4 14.5   HCT 45.8 41.5 45.0    238 246     Recent Labs     07/02/19  0811 07/01/19  0554 06/30/19  1042    134* 138   K 4.5 4.1 4.2    97* 102   CO2 29 29 30   * 164* 104*   BUN 14 11 6*   CREA 0.84 0.84 0.71   CA 9.5 8.9 9.1   ALB  --   --  3.6   TBILI  --   --  0.4   ALT  --   --  20   SGOT  --   --  14*     Recent Labs     06/30/19  1136   PHI 7.350   PCO2I 48.7*   PO2I 100   HCO3I 26.9*     No results for input(s): LCAD, LAC in the last 72 hours.     Assessment:  (Medical Decision Making)     Hospital Problems  Date Reviewed: 3/6/2019          Codes Class Noted POA    * (Principal) Acute respiratory failure with hypoxia [facility]) ICD-10-CM: J96.01  ICD-9-CM: 518.81  6/30/2019 Yes        Tobacco use (Chronic) ICD-10-CM: Z72.0  ICD-9-CM: 305.1  6/30/2019 Yes        COPD exacerbation ([facility] [address] ICD-10-CM: J44.1  ICD-9-CM: 491.21  6/30/2019 Yes        Obesity (Chronic) ICD-10-CM: E66.9  ICD-9-CM: 278.00  6/30/2019 Yes        Major depressive disorder ICD-10-CM: F32.9  ICD-9-CM: 296.20  6/10/2018 Yes Essential hypertension ICD-10-CM: I10  ICD-9-CM: 401.9  6/10/2018 Yes        GERD (gastroesophageal reflux disease) ICD-10-CM: K21.9  ICD-9-CM: 530.81  6/10/2018 Yes        Polycythemia ICD-10-CM: D75.1  ICD-9-CM: 238.4  6/10/2018 Yes              Plan:  (Medical Decision Making)       --Plans to discharge patient home today  --Recommend discharging with prednisone 40mg orally x 7 days, then 20mg x 7 days as well as levaquin (total 5 day course)  --Also recommend changing Breo to Trelegy (add LAMA) and continue albuterol PRN  --Follow up in 2-4 weeks in our office for spirometry and outpatient evaluation of presumed COPD  --Needs to continue smoking cessation  --Discussed these recommendations with patient who is agreeable with plan    More than 50% of the time documented was spent in face-to-face contact with the patient and in the care of the patient on the floor/unit where the patient is located. Tiffanie Smith NP    Lungs:  Mild expiratory wheeze persist  Heart:  RRR with no Murmur/Rubs/Gallops    Additional Comments:  Improved AECOPD. Long steroid taper, continue no smoking, O2. Will re-eval O2 requirement and PFTs as outpatient after followup. Appears stable for discharge today. I have spoken with and examined the patient. I agree with the above assessment and plan as documented.     Rajani Little MD

## 2019-07-03 NOTE — PROGRESS NOTES
Patient resting in bed quietly. Respirations even and unlabored. Bed low and locked. Bedside table, personal belongings and call light all within reach.

## 2019-07-03 NOTE — PROGRESS NOTES
Patient will discharge home today. Home O2 has been arranged through [facility]. A portable tank has already been delivered to her room for transport home today. No other discharge planning needs identified at this time. Case Management will remain available to assist as needed. Care Management Interventions  PCP Verified by CM: (Dr Brianna Du)  Mode of Transport at Discharge: (family)  Transition of Care Consult (CM Consult): Discharge Planning(Pt is insured by [facility] [employer] with pharmacy benefits. She reports that she has applied for [facility] financial assistance for copays with the financial counselor at the [id number].)  Discharge Durable Medical Equipment: No  Physical Therapy Consult: No  Occupational Therapy Consult: No  Speech Therapy Consult: No  Current Support Network: Own Home, Family Lives Nearby(Pt lives in her home and her Mother lives with her.   Supportive sister at bedside.  )  Confirm Follow Up Transport: Family  Plan discussed with Pt/Family/Caregiver: Yes  Freedom of Choice Offered: Yes  [address] Provided?: No  Discharge Location  Discharge Placement: Home

## 2019-07-03 NOTE — PROGRESS NOTES
Pt ready to leave, sister here at this time to take her home. Pt left floor in stable condition with home O2 at 2L nasal cannula. Pt voices no complaints or concerns. Pt has prescriptions and discharge paperwork along with her belongings.

## 2019-07-03 NOTE — DISCHARGE SUMMARY
Hospitalist Discharge Summary     Patient ID:  Tamra Archibald  [id number]  63 y.o.  1950  Admit date: 6/30/2019 10:30 AM  Discharge date and time: 7/3/2019  Attending: Marshall Ortiz DO  PCP:  Mehdi Enriquez MD  Treatment Team: Attending Provider: Marshall Otriz DO; Utilization Review: Shreffler, Burleigh Cogan, RN; Care Manager: Angela Martínez LMSW; Consulting Provider: Sofi Booker MD    Principal Diagnosis Acute respiratory failure with hypoxia [facility]    Hospital Problems as of 7/3/2019 Date Reviewed: 3/6/2019          Codes Class Noted - Resolved POA    * (Principal) Acute respiratory failure with hypoxia ([facility] [address] ICD-10-CM: J96.01  ICD-9-CM: 518.81  6/30/2019 - Present Yes        COPD exacerbation ([facility] ICD-10-CM: J44.1  ICD-9-CM: 491.21  6/30/2019 - Present Yes        Polycythemia ICD-10-CM: D75.1  ICD-9-CM: 238.4  6/10/2018 - Present Yes        Tobacco use (Chronic) ICD-10-CM: Z72.0  ICD-9-CM: 305.1  6/30/2019 - Present Yes        Essential hypertension ICD-10-CM: I10  ICD-9-CM: 401.9  6/10/2018 - Present Yes        Obesity (Chronic) ICD-10-CM: E66.9  ICD-9-CM: 278.00  6/30/2019 - Present Yes        Major depressive disorder ICD-10-CM: F32.9  ICD-9-CM: 296.20  6/10/2018 - Present Yes        GERD (gastroesophageal reflux disease) ICD-10-CM: K21.9  ICD-9-CM: 530.81  6/10/2018 - Present Yes                  Hospital Course:  81year old CF with a PMH of smoking, probable COPD, HTN, and depression presented to the ER with persistent cough, SOB, and acute respiratory failure due to COPD exacerbation. She was admitted and started on IV Solumedrol, Levaquin, and nebulizers. She improved over a few days, but still required oxygen. She remained stable and was discharged home on oxygen.     Significant Diagnostic Studies:    Labs: Results:       Chemistry Recent Labs     07/02/19  0811 07/01/19  0554 06/30/19  1042   * 164* 104*    134* 138   K 4.5 4.1 4.2    97* 102   CO2 29 29 30   BUN 14 11 6*   CREA 0.84 0.84 0.71   CA 9.5 8.9 9.1   AGAP 7 8 6*   AP  --   --  64   TP  --   --  7.2   ALB  --   --  3.6   GLOB  --   --  3.6*   AGRAT  --   --  1.0*      CBC w/Diff Recent Labs     07/02/19  0811 07/01/19  0554 06/30/19  1042   WBC 16.0* 4.6 6.2   RBC 5.20 4.73 5.02   HGB 15.0 13.4 14.5   HCT 45.8 41.5 45.0    238 246   GRANS 86* 79* 71   LYMPH 7* 18 18   EOS 0* 0* 0*      Cardiac Enzymes No results for input(s): CPK, CKND1, ESTEBAN in the last 72 hours. No lab exists for component: CKRMB, TROIP   Coagulation No results for input(s): PTP, INR, APTT in the last 72 hours. No lab exists for component: INREXT    Lipid Panel No results found for: CHOL, CHOLPOCT, CHOLX, CHLST, CHOLV, 060419, HDL, LDL, LDLC, DLDLP, 844600, VLDLC, VLDL, TGLX, TRIGL, TRIGP, TGLPOCT, CHHD, CHHDX   BNP No results for input(s): BNPP in the last 72 hours. Liver Enzymes Recent Labs     06/30/19  1042   TP 7.2   ALB 3.6   AP 64   SGOT 14*      Thyroid Studies No results found for: T4, T3U, TSH, TSHEXT         Imaging:  Xr Chest Pa Lat    Result Date: 6/30/2019  IMPRESSION: Negative for acute abnormality. Microbiology/Cultures: All Micro Results     None          Discharge Exam:  Visit Vitals  /77   Pulse 75   Temp 97.5 °F (36.4 °C)   Resp 18   Ht 5' 4\" (1.626 m)   Wt 95.5 kg (210 lb 8.6 oz)   SpO2 91%   BMI 36.14 kg/m²     General appearance: alert, cooperative, no distress, appears stated age  Lungs: Diffuse coarse wheeze  Heart: regular rate and rhythm, S1, S2 normal, no murmur, click, rub or gallop  Abdomen: soft, non-tender. Bowel sounds normal. No masses,  no organomegaly  Extremities: no cyanosis or edema  Neurologic: Grossly normal    Disposition: home  Discharge Condition: stable  Patient Instructions:   Current Discharge Medication List      START taking these medications    Details   guaiFENesin ER (MUCINEX) 1,200 mg Ta12 ER tablet Take 1 Tab by mouth every twelve (12) hours.   Qty: 14 Tab, Refills: 0      levoFLOXacin (LEVAQUIN) 750 mg tablet Take 1 Tab by mouth every twenty-four (24) hours for 4 days. Qty: 4 Tab, Refills: 0      predniSONE (DELTASONE) 10 mg tablet Take 40 mg by mouth daily (with breakfast). Take 4 tabs x 3 days then 2 tabs x 5 days then 1 tab x 5 days  Qty: 27 Tab, Refills: 0         CONTINUE these medications which have NOT CHANGED    Details   aspirin delayed-release 81 mg tablet Take  by mouth daily. albuterol (VENTOLIN HFA) 90 mcg/actuation inhaler Ventolin HFA 90 mcg/actuation aerosol inhaler      raNITIdine (ZANTAC 75) 75 mg tablet Take 75 mg by mouth two (2) times a day. fluticasone-salmeterol (ADVAIR DISKUS) 250-50 mcg/dose diskus inhaler Take 1 Puff by inhalation every twelve (12) hours. lamoTRIgine (LAMICTAL) 100 mg tablet Take  by mouth two (2) times a day. MULTIVITAMINS W-MINERALS/LUT (CENTRUM SILVER PO) Take  by mouth. buPROPion SR (WELLBUTRIN SR) 150 mg SR tablet Take 150 mg by mouth two (2) times a day. citalopram (CELEXA) 10 mg tablet Take  by mouth daily. hydrochlorothiazide (HYDRODIURIL) 25 mg tablet Take 25 mg by mouth daily.              Activity: Activity as tolerated  Diet: Regular Diet  Wound Care: None needed    Follow-up  ·   Dr. Lio Lewis in one week  · [facility] in 2-3 weeks  Time spent to discharge patient 35 minutes  Signed:  Cindi Valiente DO  7/3/2019  8:08 AM

## 2019-07-16 PROBLEM — J96.01 ACUTE RESPIRATORY FAILURE WITH HYPOXIA (HCC): Status: RESOLVED | Noted: 2019-06-30 | Resolved: 2019-07-16

## 2019-07-16 PROBLEM — J44.1 COPD EXACERBATION (HCC): Status: RESOLVED | Noted: 2019-06-30 | Resolved: 2019-07-16

## 2019-07-16 PROBLEM — Z72.0 TOBACCO USE: Chronic | Status: RESOLVED | Noted: 2019-06-30 | Resolved: 2019-07-16

## 2019-07-16 PROBLEM — F17.210 CIGARETTE NICOTINE DEPENDENCE WITHOUT COMPLICATION: Status: ACTIVE | Noted: 2018-06-10

## 2019-07-17 ENCOUNTER — HOSPITAL ENCOUNTER (OUTPATIENT)
Dept: LAB | Age: 69
Discharge: HOME | End: 2019-07-17
Payer: MEDICARE

## 2019-07-17 DIAGNOSIS — D75.1 POLYCYTHEMIA: ICD-10-CM

## 2019-07-17 LAB
ALBUMIN SERPL-MCNC: 3.6 G/DL (ref 3.2–4.6)
ALBUMIN/GLOB SERPL: 1 {RATIO} (ref 1.2–3.5)
ALP SERPL-CCNC: 55 U/L (ref 50–136)
ALT SERPL-CCNC: 18 U/L (ref 12–65)
ANION GAP SERPL CALC-SCNC: 6 MMOL/L (ref 7–16)
AST SERPL-CCNC: 7 U/L (ref 15–37)
BASOPHILS # BLD: 0 K/UL (ref 0–0.2)
BASOPHILS NFR BLD: 0 % (ref 0–2)
BILIRUB SERPL-MCNC: 0.5 MG/DL (ref 0.2–1.1)
BUN SERPL-MCNC: 11 MG/DL (ref 8–23)
CALCIUM SERPL-MCNC: 9.3 MG/DL (ref 8.3–10.4)
CHLORIDE SERPL-SCNC: 99 MMOL/L (ref 98–107)
CO2 SERPL-SCNC: 31 MMOL/L (ref 21–32)
CREAT SERPL-MCNC: 0.89 MG/DL (ref 0.6–1)
DIFFERENTIAL METHOD BLD: ABNORMAL
EOSINOPHIL # BLD: 0 K/UL (ref 0–0.8)
EOSINOPHIL NFR BLD: 0 % (ref 0.5–7.8)
ERYTHROCYTE [DISTWIDTH] IN BLOOD BY AUTOMATED COUNT: 13.8 % (ref 11.9–14.6)
GLOBULIN SER CALC-MCNC: 3.5 G/DL (ref 2.3–3.5)
GLUCOSE SERPL-MCNC: 106 MG/DL (ref 65–100)
HCT VFR BLD AUTO: 43.6 % (ref 35.8–46.3)
HGB BLD-MCNC: 14 G/DL (ref 11.7–15.4)
IMM GRANULOCYTES # BLD AUTO: 0.1 K/UL (ref 0–0.5)
IMM GRANULOCYTES NFR BLD AUTO: 1 % (ref 0–5)
LYMPHOCYTES # BLD: 0.7 K/UL (ref 0.5–4.6)
LYMPHOCYTES NFR BLD: 7 % (ref 13–44)
MCH RBC QN AUTO: 28.2 PG (ref 26.1–32.9)
MCHC RBC AUTO-ENTMCNC: 32.1 G/DL (ref 31.4–35)
MCV RBC AUTO: 87.7 FL (ref 79.6–97.8)
MONOCYTES # BLD: 0.6 K/UL (ref 0.1–1.3)
MONOCYTES NFR BLD: 6 % (ref 4–12)
NEUTS SEG # BLD: 8.7 K/UL (ref 1.7–8.2)
NEUTS SEG NFR BLD: 87 % (ref 43–78)
NRBC # BLD: 0 K/UL (ref 0–0.2)
PLATELET # BLD AUTO: 279 K/UL (ref 150–450)
PMV BLD AUTO: 8.9 FL (ref 9.4–12.3)
POTASSIUM SERPL-SCNC: 4 MMOL/L (ref 3.5–5.1)
PROT SERPL-MCNC: 7.1 G/DL (ref 6.3–8.2)
RBC # BLD AUTO: 4.97 M/UL (ref 4.05–5.25)
SODIUM SERPL-SCNC: 136 MMOL/L (ref 136–145)
WBC # BLD AUTO: 10.1 K/UL (ref 4.3–11.1)

## 2019-07-17 PROCEDURE — 80053 COMPREHEN METABOLIC PANEL: CPT

## 2019-07-17 PROCEDURE — 85025 COMPLETE CBC W/AUTO DIFF WBC: CPT

## 2019-07-17 PROCEDURE — 36415 COLL VENOUS BLD VENIPUNCTURE: CPT

## 2019-08-01 ENCOUNTER — HOSPITAL ENCOUNTER (OUTPATIENT)
Dept: CT IMAGING | Age: 69
Discharge: HOME | End: 2019-08-01
Attending: NURSE PRACTITIONER
Payer: MEDICARE

## 2019-08-01 VITALS — HEIGHT: 64 IN | BODY MASS INDEX: 34.31 KG/M2 | WEIGHT: 201 LBS

## 2019-08-01 DIAGNOSIS — F17.210 CIGARETTE NICOTINE DEPENDENCE WITHOUT COMPLICATION: ICD-10-CM

## 2019-08-01 DIAGNOSIS — Z12.2 SCREENING FOR MALIGNANT NEOPLASM OF RESPIRATORY ORGAN: ICD-10-CM

## 2019-08-01 PROCEDURE — G0297 LDCT FOR LUNG CA SCREEN: HCPCS

## 2019-08-01 NOTE — PROGRESS NOTES
Please let patient know that CT reveals small nodule in RML, and mild emphysema changes. We will arrange this at next visit.

## 2019-08-06 NOTE — PROGRESS NOTES
Please let patient know that CT reveals nodule in RML, LLL, and mild emphysema changes. Needs follow up CT scan in 1 year which we will arrange at next visit. Thank you.

## 2019-08-06 NOTE — PROGRESS NOTES
CT reveals nodule in RML, LLL, and mild emphysema changes. Needs follow up CT scan in 1 year which we will arrange at next visit. Pt verbalized understanding.

## 2019-10-28 ENCOUNTER — HOSPITAL ENCOUNTER (OUTPATIENT)
Dept: LAB | Age: 69
Discharge: HOME | End: 2019-10-28
Payer: MEDICARE

## 2019-10-28 DIAGNOSIS — D75.1 POLYCYTHEMIA: ICD-10-CM

## 2019-10-28 LAB
ALBUMIN SERPL-MCNC: 3.8 G/DL (ref 3.2–4.6)
ALBUMIN/GLOB SERPL: 1.1 {RATIO} (ref 1.2–3.5)
ALP SERPL-CCNC: 78 U/L (ref 50–136)
ALT SERPL-CCNC: 22 U/L (ref 12–65)
ANION GAP SERPL CALC-SCNC: 8 MMOL/L (ref 7–16)
AST SERPL-CCNC: 10 U/L (ref 15–37)
BASOPHILS # BLD: 0 K/UL (ref 0–0.2)
BASOPHILS NFR BLD: 0 % (ref 0–2)
BILIRUB SERPL-MCNC: 0.4 MG/DL (ref 0.2–1.1)
BUN SERPL-MCNC: 21 MG/DL (ref 8–23)
CALCIUM SERPL-MCNC: 9.3 MG/DL (ref 8.3–10.4)
CHLORIDE SERPL-SCNC: 100 MMOL/L (ref 98–107)
CO2 SERPL-SCNC: 29 MMOL/L (ref 21–32)
CREAT SERPL-MCNC: 0.95 MG/DL (ref 0.6–1)
DIFFERENTIAL METHOD BLD: ABNORMAL
EOSINOPHIL # BLD: 0 K/UL (ref 0–0.8)
EOSINOPHIL NFR BLD: 0 % (ref 0.5–7.8)
ERYTHROCYTE [DISTWIDTH] IN BLOOD BY AUTOMATED COUNT: 14.4 % (ref 11.9–14.6)
GLOBULIN SER CALC-MCNC: 3.5 G/DL (ref 2.3–3.5)
GLUCOSE SERPL-MCNC: 102 MG/DL (ref 65–100)
HCT VFR BLD AUTO: 44.2 % (ref 35.8–46.3)
HGB BLD-MCNC: 13.9 G/DL (ref 11.7–15.4)
IMM GRANULOCYTES # BLD AUTO: 0.1 K/UL (ref 0–0.5)
IMM GRANULOCYTES NFR BLD AUTO: 1 % (ref 0–5)
LYMPHOCYTES # BLD: 1.1 K/UL (ref 0.5–4.6)
LYMPHOCYTES NFR BLD: 8 % (ref 13–44)
MCH RBC QN AUTO: 28.5 PG (ref 26.1–32.9)
MCHC RBC AUTO-ENTMCNC: 31.4 G/DL (ref 31.4–35)
MCV RBC AUTO: 90.6 FL (ref 79.6–97.8)
MONOCYTES # BLD: 0.9 K/UL (ref 0.1–1.3)
MONOCYTES NFR BLD: 6 % (ref 4–12)
NEUTS SEG # BLD: 11.7 K/UL (ref 1.7–8.2)
NEUTS SEG NFR BLD: 85 % (ref 43–78)
NRBC # BLD: 0 K/UL (ref 0–0.2)
PLATELET # BLD AUTO: 323 K/UL (ref 150–450)
PMV BLD AUTO: 8.5 FL (ref 9.4–12.3)
POTASSIUM SERPL-SCNC: 4 MMOL/L (ref 3.5–5.1)
PROT SERPL-MCNC: 7.3 G/DL (ref 6.3–8.2)
RBC # BLD AUTO: 4.88 M/UL (ref 4.05–5.25)
SODIUM SERPL-SCNC: 137 MMOL/L (ref 136–145)
WBC # BLD AUTO: 13.9 K/UL (ref 4.3–11.1)

## 2019-10-28 PROCEDURE — 36415 COLL VENOUS BLD VENIPUNCTURE: CPT

## 2019-10-28 PROCEDURE — 80053 COMPREHEN METABOLIC PANEL: CPT

## 2019-10-28 PROCEDURE — 85025 COMPLETE CBC W/AUTO DIFF WBC: CPT

## 2020-01-14 ENCOUNTER — HOSPITAL ENCOUNTER (OUTPATIENT)
Age: 70
Setting detail: OBSERVATION
Discharge: HOME | End: 2020-01-16
Attending: EMERGENCY MEDICINE | Admitting: SURGERY
Payer: MEDICARE

## 2020-01-14 ENCOUNTER — HOSPITAL ENCOUNTER (OUTPATIENT)
Dept: ULTRASOUND IMAGING | Age: 70
Discharge: HOME | End: 2020-01-14
Attending: NURSE PRACTITIONER
Payer: MEDICARE

## 2020-01-14 DIAGNOSIS — R10.11 RIGHT UPPER QUADRANT PAIN: ICD-10-CM

## 2020-01-14 DIAGNOSIS — K81.0 ACUTE CHOLECYSTITIS: Primary | ICD-10-CM

## 2020-01-14 DIAGNOSIS — K81.9 CHOLECYSTITIS: ICD-10-CM

## 2020-01-14 DIAGNOSIS — R10.13 EPIGASTRIC ABDOMINAL PAIN: ICD-10-CM

## 2020-01-14 DIAGNOSIS — J44.9 CHRONIC OBSTRUCTIVE PULMONARY DISEASE, UNSPECIFIED COPD TYPE (HCC): ICD-10-CM

## 2020-01-14 LAB
ABO + RH BLD: NORMAL
BLOOD GROUP ANTIBODIES SERPL: NORMAL
LIPASE SERPL-CCNC: 150 U/L (ref 73–393)
SPECIMEN EXP DATE BLD: NORMAL
TROPONIN I SERPL-MCNC: <0.02 NG/ML (ref 0.02–0.05)

## 2020-01-14 PROCEDURE — 94640 AIRWAY INHALATION TREATMENT: CPT

## 2020-01-14 PROCEDURE — 86900 BLOOD TYPING SEROLOGIC ABO: CPT

## 2020-01-14 PROCEDURE — 65390000012 HC CONDITION CODE 44 OBSERVATION

## 2020-01-14 PROCEDURE — 74011250636 HC RX REV CODE- 250/636: Performed by: EMERGENCY MEDICINE

## 2020-01-14 PROCEDURE — 96365 THER/PROPH/DIAG IV INF INIT: CPT | Performed by: EMERGENCY MEDICINE

## 2020-01-14 PROCEDURE — 99284 EMERGENCY DEPT VISIT MOD MDM: CPT | Performed by: EMERGENCY MEDICINE

## 2020-01-14 PROCEDURE — 93005 ELECTROCARDIOGRAM TRACING: CPT | Performed by: EMERGENCY MEDICINE

## 2020-01-14 PROCEDURE — 83690 ASSAY OF LIPASE: CPT

## 2020-01-14 PROCEDURE — 74011000258 HC RX REV CODE- 258: Performed by: EMERGENCY MEDICINE

## 2020-01-14 PROCEDURE — 96375 TX/PRO/DX INJ NEW DRUG ADDON: CPT | Performed by: EMERGENCY MEDICINE

## 2020-01-14 PROCEDURE — 65270000029 HC RM PRIVATE

## 2020-01-14 PROCEDURE — 77030013140 HC MSK NEB VYRM -A

## 2020-01-14 PROCEDURE — 74011250636 HC RX REV CODE- 250/636: Performed by: SURGERY

## 2020-01-14 PROCEDURE — 84484 ASSAY OF TROPONIN QUANT: CPT

## 2020-01-14 PROCEDURE — 74011000250 HC RX REV CODE- 250: Performed by: EMERGENCY MEDICINE

## 2020-01-14 PROCEDURE — 96361 HYDRATE IV INFUSION ADD-ON: CPT

## 2020-01-14 PROCEDURE — 76700 US EXAM ABDOM COMPLETE: CPT

## 2020-01-14 RX ORDER — SODIUM CHLORIDE 9 MG/ML
100 INJECTION, SOLUTION INTRAVENOUS CONTINUOUS
Status: DISCONTINUED | OUTPATIENT
Start: 2020-01-14 | End: 2020-01-16 | Stop reason: HOSPADM

## 2020-01-14 RX ORDER — HYDROMORPHONE HYDROCHLORIDE 1 MG/ML
1 INJECTION, SOLUTION INTRAMUSCULAR; INTRAVENOUS; SUBCUTANEOUS
Status: COMPLETED | OUTPATIENT
Start: 2020-01-14 | End: 2020-01-14

## 2020-01-14 RX ORDER — ONDANSETRON 2 MG/ML
4 INJECTION INTRAMUSCULAR; INTRAVENOUS
Status: DISCONTINUED | OUTPATIENT
Start: 2020-01-14 | End: 2020-01-16 | Stop reason: HOSPADM

## 2020-01-14 RX ORDER — HYDROMORPHONE HYDROCHLORIDE 1 MG/ML
1 INJECTION, SOLUTION INTRAMUSCULAR; INTRAVENOUS; SUBCUTANEOUS
Status: DISCONTINUED | OUTPATIENT
Start: 2020-01-14 | End: 2020-01-16 | Stop reason: HOSPADM

## 2020-01-14 RX ORDER — IPRATROPIUM BROMIDE AND ALBUTEROL SULFATE 2.5; .5 MG/3ML; MG/3ML
3 SOLUTION RESPIRATORY (INHALATION)
Status: COMPLETED | OUTPATIENT
Start: 2020-01-14 | End: 2020-01-14

## 2020-01-14 RX ORDER — METOCLOPRAMIDE HYDROCHLORIDE 5 MG/ML
10 INJECTION INTRAMUSCULAR; INTRAVENOUS
Status: COMPLETED | OUTPATIENT
Start: 2020-01-14 | End: 2020-01-14

## 2020-01-14 RX ADMIN — SODIUM CHLORIDE 1000 ML: 900 INJECTION, SOLUTION INTRAVENOUS at 19:41

## 2020-01-14 RX ADMIN — HYDROMORPHONE HYDROCHLORIDE 1 MG: 1 INJECTION, SOLUTION INTRAMUSCULAR; INTRAVENOUS; SUBCUTANEOUS at 19:43

## 2020-01-14 RX ADMIN — FAMOTIDINE 20 MG: 10 INJECTION, SOLUTION INTRAVENOUS at 19:46

## 2020-01-14 RX ADMIN — METOCLOPRAMIDE 10 MG: 5 INJECTION, SOLUTION INTRAMUSCULAR; INTRAVENOUS at 19:45

## 2020-01-14 RX ADMIN — IPRATROPIUM BROMIDE AND ALBUTEROL SULFATE 3 ML: .5; 3 SOLUTION RESPIRATORY (INHALATION) at 19:17

## 2020-01-14 RX ADMIN — SODIUM CHLORIDE 100 ML/HR: 900 INJECTION, SOLUTION INTRAVENOUS at 21:30

## 2020-01-14 RX ADMIN — SODIUM CHLORIDE 4.5 G: 900 INJECTION, SOLUTION INTRAVENOUS at 19:49

## 2020-01-14 NOTE — ED TRIAGE NOTES
Pt c/o right upper abdominal pain that started on Saturday and associated with vomiting. Pt has US and it \"shows a bad gallbladder. \"

## 2020-01-15 ENCOUNTER — ANESTHESIA EVENT (OUTPATIENT)
Dept: SURGERY | Age: 70
End: 2020-01-15
Payer: MEDICARE

## 2020-01-15 ENCOUNTER — ANESTHESIA (OUTPATIENT)
Dept: SURGERY | Age: 70
End: 2020-01-15
Payer: MEDICARE

## 2020-01-15 LAB
ATRIAL RATE: 81 BPM
CALCULATED P AXIS, ECG09: 74 DEGREES
CALCULATED R AXIS, ECG10: 86 DEGREES
CALCULATED T AXIS, ECG11: 65 DEGREES
DIAGNOSIS, 93000: NORMAL
P-R INTERVAL, ECG05: 166 MS
Q-T INTERVAL, ECG07: 352 MS
QRS DURATION, ECG06: 80 MS
QTC CALCULATION (BEZET), ECG08: 408 MS
VENTRICULAR RATE, ECG03: 81 BPM

## 2020-01-15 PROCEDURE — 99218 HC RM OBSERVATION: CPT

## 2020-01-15 PROCEDURE — 77030037088 HC TUBE ENDOTRACH ORAL NSL COVD-A: Performed by: ANESTHESIOLOGY

## 2020-01-15 PROCEDURE — 77030008606 HC TRCR ENDOSC KII AMR -B: Performed by: SURGERY

## 2020-01-15 PROCEDURE — 88304 TISSUE EXAM BY PATHOLOGIST: CPT

## 2020-01-15 PROCEDURE — 65390000012 HC CONDITION CODE 44 OBSERVATION

## 2020-01-15 PROCEDURE — 74011250636 HC RX REV CODE- 250/636: Performed by: NURSE ANESTHETIST, CERTIFIED REGISTERED

## 2020-01-15 PROCEDURE — 77030008522 HC TBNG INSUF LAPRO STRY -B: Performed by: SURGERY

## 2020-01-15 PROCEDURE — 77030040922 HC BLNKT HYPOTHRM STRY -A: Performed by: ANESTHESIOLOGY

## 2020-01-15 PROCEDURE — 74011000250 HC RX REV CODE- 250: Performed by: NURSE ANESTHETIST, CERTIFIED REGISTERED

## 2020-01-15 PROCEDURE — 77030018836 HC SOL IRR NACL ICUM -A: Performed by: SURGERY

## 2020-01-15 PROCEDURE — 74011000250 HC RX REV CODE- 250: Performed by: SURGERY

## 2020-01-15 PROCEDURE — 76010000162 HC OR TIME 1.5 TO 2 HR INTENSV-TIER 1: Performed by: SURGERY

## 2020-01-15 PROCEDURE — 77030021678 HC GLIDESCP STAT DISP VERT -B: Performed by: ANESTHESIOLOGY

## 2020-01-15 PROCEDURE — 76060000034 HC ANESTHESIA 1.5 TO 2 HR: Performed by: SURGERY

## 2020-01-15 PROCEDURE — 77030010507 HC ADH SKN DERMBND J&J -B: Performed by: SURGERY

## 2020-01-15 PROCEDURE — 74011250636 HC RX REV CODE- 250/636: Performed by: ANESTHESIOLOGY

## 2020-01-15 PROCEDURE — 74011250637 HC RX REV CODE- 250/637: Performed by: SURGERY

## 2020-01-15 PROCEDURE — 77030012770 HC TRCR OPT FX AMR -B: Performed by: SURGERY

## 2020-01-15 PROCEDURE — 77030000038 HC TIP SCIS LAPSCP SURI -B: Performed by: SURGERY

## 2020-01-15 PROCEDURE — 74011250636 HC RX REV CODE- 250/636: Performed by: SURGERY

## 2020-01-15 PROCEDURE — 77030012022 HC APPL CLP ENDOSC COVD -C: Performed by: SURGERY

## 2020-01-15 PROCEDURE — 96366 THER/PROPH/DIAG IV INF ADDON: CPT

## 2020-01-15 PROCEDURE — 77030021158 HC TRCR BLN GELPRT AMR -B: Performed by: SURGERY

## 2020-01-15 PROCEDURE — 76210000063 HC OR PH I REC FIRST 0.5 HR: Performed by: SURGERY

## 2020-01-15 PROCEDURE — 77030020255 HC SOL INJ LR 1000ML BG

## 2020-01-15 PROCEDURE — 77030020263 HC SOL INJ SOD CL0.9% LFCR 1000ML

## 2020-01-15 PROCEDURE — 77030020829: Performed by: SURGERY

## 2020-01-15 PROCEDURE — 77030031139 HC SUT VCRL2 J&J -A: Performed by: SURGERY

## 2020-01-15 PROCEDURE — 96361 HYDRATE IV INFUSION ADD-ON: CPT

## 2020-01-15 PROCEDURE — 74011000258 HC RX REV CODE- 258: Performed by: SURGERY

## 2020-01-15 PROCEDURE — 77030002933 HC SUT MCRYL J&J -A: Performed by: SURGERY

## 2020-01-15 RX ORDER — LIDOCAINE HYDROCHLORIDE 20 MG/ML
INJECTION, SOLUTION EPIDURAL; INFILTRATION; INTRACAUDAL; PERINEURAL AS NEEDED
Status: DISCONTINUED | OUTPATIENT
Start: 2020-01-15 | End: 2020-01-15 | Stop reason: HOSPADM

## 2020-01-15 RX ORDER — CEFAZOLIN SODIUM/WATER 2 G/20 ML
SYRINGE (ML) INTRAVENOUS AS NEEDED
Status: DISCONTINUED | OUTPATIENT
Start: 2020-01-15 | End: 2020-01-15 | Stop reason: HOSPADM

## 2020-01-15 RX ORDER — OXYCODONE AND ACETAMINOPHEN 5; 325 MG/1; MG/1
1 TABLET ORAL
Status: DISCONTINUED | OUTPATIENT
Start: 2020-01-15 | End: 2020-01-16 | Stop reason: HOSPADM

## 2020-01-15 RX ORDER — PROPOFOL 10 MG/ML
INJECTION, EMULSION INTRAVENOUS AS NEEDED
Status: DISCONTINUED | OUTPATIENT
Start: 2020-01-15 | End: 2020-01-15 | Stop reason: HOSPADM

## 2020-01-15 RX ORDER — OXYCODONE HYDROCHLORIDE 5 MG/1
5 TABLET ORAL
Status: DISCONTINUED | OUTPATIENT
Start: 2020-01-15 | End: 2020-01-15 | Stop reason: HOSPADM

## 2020-01-15 RX ORDER — SODIUM CHLORIDE, SODIUM LACTATE, POTASSIUM CHLORIDE, CALCIUM CHLORIDE 600; 310; 30; 20 MG/100ML; MG/100ML; MG/100ML; MG/100ML
75 INJECTION, SOLUTION INTRAVENOUS CONTINUOUS
Status: DISCONTINUED | OUTPATIENT
Start: 2020-01-15 | End: 2020-01-16 | Stop reason: HOSPADM

## 2020-01-15 RX ORDER — OXYCODONE AND ACETAMINOPHEN 10; 325 MG/1; MG/1
1 TABLET ORAL AS NEEDED
Status: DISCONTINUED | OUTPATIENT
Start: 2020-01-15 | End: 2020-01-15 | Stop reason: HOSPADM

## 2020-01-15 RX ORDER — NEOSTIGMINE METHYLSULFATE 1 MG/ML
INJECTION, SOLUTION INTRAVENOUS AS NEEDED
Status: DISCONTINUED | OUTPATIENT
Start: 2020-01-15 | End: 2020-01-15 | Stop reason: HOSPADM

## 2020-01-15 RX ORDER — FENTANYL CITRATE 50 UG/ML
INJECTION, SOLUTION INTRAMUSCULAR; INTRAVENOUS AS NEEDED
Status: DISCONTINUED | OUTPATIENT
Start: 2020-01-15 | End: 2020-01-15 | Stop reason: HOSPADM

## 2020-01-15 RX ORDER — BUPIVACAINE HYDROCHLORIDE 5 MG/ML
INJECTION, SOLUTION EPIDURAL; INTRACAUDAL AS NEEDED
Status: DISCONTINUED | OUTPATIENT
Start: 2020-01-15 | End: 2020-01-15 | Stop reason: HOSPADM

## 2020-01-15 RX ORDER — GLYCOPYRROLATE 0.2 MG/ML
INJECTION INTRAMUSCULAR; INTRAVENOUS AS NEEDED
Status: DISCONTINUED | OUTPATIENT
Start: 2020-01-15 | End: 2020-01-15 | Stop reason: HOSPADM

## 2020-01-15 RX ORDER — HYDROMORPHONE HYDROCHLORIDE 2 MG/ML
0.5 INJECTION, SOLUTION INTRAMUSCULAR; INTRAVENOUS; SUBCUTANEOUS
Status: DISCONTINUED | OUTPATIENT
Start: 2020-01-15 | End: 2020-01-15 | Stop reason: HOSPADM

## 2020-01-15 RX ORDER — SODIUM CHLORIDE 0.9 % (FLUSH) 0.9 %
5-40 SYRINGE (ML) INJECTION EVERY 8 HOURS
Status: DISCONTINUED | OUTPATIENT
Start: 2020-01-15 | End: 2020-01-15 | Stop reason: HOSPADM

## 2020-01-15 RX ORDER — SODIUM CHLORIDE 0.9 % (FLUSH) 0.9 %
5-40 SYRINGE (ML) INJECTION AS NEEDED
Status: DISCONTINUED | OUTPATIENT
Start: 2020-01-15 | End: 2020-01-15 | Stop reason: HOSPADM

## 2020-01-15 RX ORDER — ONDANSETRON 2 MG/ML
INJECTION INTRAMUSCULAR; INTRAVENOUS AS NEEDED
Status: DISCONTINUED | OUTPATIENT
Start: 2020-01-15 | End: 2020-01-15 | Stop reason: HOSPADM

## 2020-01-15 RX ORDER — EPHEDRINE SULFATE/0.9% NACL/PF 50 MG/5 ML
SYRINGE (ML) INTRAVENOUS AS NEEDED
Status: DISCONTINUED | OUTPATIENT
Start: 2020-01-15 | End: 2020-01-15 | Stop reason: HOSPADM

## 2020-01-15 RX ORDER — PANTOPRAZOLE SODIUM 40 MG/1
40 TABLET, DELAYED RELEASE ORAL DAILY
Status: DISCONTINUED | OUTPATIENT
Start: 2020-01-16 | End: 2020-01-16 | Stop reason: HOSPADM

## 2020-01-15 RX ORDER — DEXAMETHASONE SODIUM PHOSPHATE 4 MG/ML
INJECTION, SOLUTION INTRA-ARTICULAR; INTRALESIONAL; INTRAMUSCULAR; INTRAVENOUS; SOFT TISSUE AS NEEDED
Status: DISCONTINUED | OUTPATIENT
Start: 2020-01-15 | End: 2020-01-15 | Stop reason: HOSPADM

## 2020-01-15 RX ORDER — PROMETHAZINE HYDROCHLORIDE 25 MG/1
25 TABLET ORAL
Status: DISCONTINUED | OUTPATIENT
Start: 2020-01-15 | End: 2020-01-16 | Stop reason: HOSPADM

## 2020-01-15 RX ORDER — ONDANSETRON 2 MG/ML
4 INJECTION INTRAMUSCULAR; INTRAVENOUS
Status: DISCONTINUED | OUTPATIENT
Start: 2020-01-15 | End: 2020-01-16 | Stop reason: HOSPADM

## 2020-01-15 RX ORDER — ALBUTEROL SULFATE 0.83 MG/ML
5 SOLUTION RESPIRATORY (INHALATION)
Status: DISCONTINUED | OUTPATIENT
Start: 2020-01-15 | End: 2020-01-16

## 2020-01-15 RX ORDER — MIDAZOLAM HYDROCHLORIDE 1 MG/ML
2 INJECTION, SOLUTION INTRAMUSCULAR; INTRAVENOUS ONCE
Status: COMPLETED | OUTPATIENT
Start: 2020-01-15 | End: 2020-01-15

## 2020-01-15 RX ORDER — ROCURONIUM BROMIDE 10 MG/ML
INJECTION, SOLUTION INTRAVENOUS AS NEEDED
Status: DISCONTINUED | OUTPATIENT
Start: 2020-01-15 | End: 2020-01-15 | Stop reason: HOSPADM

## 2020-01-15 RX ORDER — SUCCINYLCHOLINE CHLORIDE 20 MG/ML
INJECTION INTRAMUSCULAR; INTRAVENOUS AS NEEDED
Status: DISCONTINUED | OUTPATIENT
Start: 2020-01-15 | End: 2020-01-15 | Stop reason: HOSPADM

## 2020-01-15 RX ADMIN — PROPOFOL 130 MG: 10 INJECTION, EMULSION INTRAVENOUS at 17:03

## 2020-01-15 RX ADMIN — LIDOCAINE HYDROCHLORIDE 60 MG: 20 INJECTION, SOLUTION EPIDURAL; INFILTRATION; INTRACAUDAL; PERINEURAL at 17:03

## 2020-01-15 RX ADMIN — FENTANYL CITRATE 50 MCG: 50 INJECTION INTRAMUSCULAR; INTRAVENOUS at 17:25

## 2020-01-15 RX ADMIN — SODIUM CHLORIDE, SODIUM LACTATE, POTASSIUM CHLORIDE, AND CALCIUM CHLORIDE: 600; 310; 30; 20 INJECTION, SOLUTION INTRAVENOUS at 17:58

## 2020-01-15 RX ADMIN — MIDAZOLAM 2 MG: 1 INJECTION INTRAMUSCULAR; INTRAVENOUS at 16:43

## 2020-01-15 RX ADMIN — PIPERACILLIN AND TAZOBACTAM 3.38 G: 3; .375 INJECTION, POWDER, LYOPHILIZED, FOR SOLUTION INTRAVENOUS at 20:07

## 2020-01-15 RX ADMIN — Medication 10 MG: at 17:11

## 2020-01-15 RX ADMIN — ROCURONIUM BROMIDE 5 MG: 10 INJECTION, SOLUTION INTRAVENOUS at 17:49

## 2020-01-15 RX ADMIN — PIPERACILLIN AND TAZOBACTAM 3.38 G: 3; .375 INJECTION, POWDER, LYOPHILIZED, FOR SOLUTION INTRAVENOUS at 06:38

## 2020-01-15 RX ADMIN — SUCCINYLCHOLINE CHLORIDE 140 MG: 20 INJECTION, SOLUTION INTRAMUSCULAR; INTRAVENOUS at 17:03

## 2020-01-15 RX ADMIN — Medication 3 MG: at 18:14

## 2020-01-15 RX ADMIN — GLYCOPYRROLATE 0.4 MG: 0.2 INJECTION, SOLUTION INTRAMUSCULAR; INTRAVENOUS at 18:14

## 2020-01-15 RX ADMIN — PIPERACILLIN AND TAZOBACTAM 3.38 G: 3; .375 INJECTION, POWDER, LYOPHILIZED, FOR SOLUTION INTRAVENOUS at 11:48

## 2020-01-15 RX ADMIN — FENTANYL CITRATE 50 MCG: 50 INJECTION INTRAMUSCULAR; INTRAVENOUS at 17:03

## 2020-01-15 RX ADMIN — HYDROMORPHONE HYDROCHLORIDE 1 MG: 1 INJECTION, SOLUTION INTRAMUSCULAR; INTRAVENOUS; SUBCUTANEOUS at 09:41

## 2020-01-15 RX ADMIN — OXYCODONE HYDROCHLORIDE AND ACETAMINOPHEN 1 TABLET: 5; 325 TABLET ORAL at 21:22

## 2020-01-15 RX ADMIN — ROCURONIUM BROMIDE 5 MG: 10 INJECTION, SOLUTION INTRAVENOUS at 17:03

## 2020-01-15 RX ADMIN — ONDANSETRON 4 MG: 2 INJECTION INTRAMUSCULAR; INTRAVENOUS at 18:06

## 2020-01-15 RX ADMIN — HYDROMORPHONE HYDROCHLORIDE 1 MG: 1 INJECTION, SOLUTION INTRAMUSCULAR; INTRAVENOUS; SUBCUTANEOUS at 20:07

## 2020-01-15 RX ADMIN — Medication 2 G: at 17:19

## 2020-01-15 RX ADMIN — ONDANSETRON 4 MG: 2 INJECTION INTRAMUSCULAR; INTRAVENOUS at 14:26

## 2020-01-15 RX ADMIN — ROCURONIUM BROMIDE 30 MG: 10 INJECTION, SOLUTION INTRAVENOUS at 17:16

## 2020-01-15 RX ADMIN — DEXAMETHASONE SODIUM PHOSPHATE 5 MG: 4 INJECTION, SOLUTION INTRAMUSCULAR; INTRAVENOUS at 17:28

## 2020-01-15 RX ADMIN — SODIUM CHLORIDE 100 ML/HR: 900 INJECTION, SOLUTION INTRAVENOUS at 09:50

## 2020-01-15 RX ADMIN — HYDROMORPHONE HYDROCHLORIDE 1 MG: 1 INJECTION, SOLUTION INTRAMUSCULAR; INTRAVENOUS; SUBCUTANEOUS at 23:53

## 2020-01-15 RX ADMIN — SODIUM CHLORIDE, SODIUM LACTATE, POTASSIUM CHLORIDE, AND CALCIUM CHLORIDE: 600; 310; 30; 20 INJECTION, SOLUTION INTRAVENOUS at 16:51

## 2020-01-15 NOTE — H&P
H&P/Consult Note/Progress Note/Office Note:   Benja Sousa  MRN: [id number]  EFR:[birth year]  Age:69 y.o.    HPI: Benja Sousa is a 81 y.o. female who has PMHx of COPD, HTN, GERD, anxiety, and depression we are asked by ED to see for cholecystitis. Pt reports a h/o 3 gallbladder attacks in the last year. She has known gallstones but did not seek surgery due to insurance issues. Pt reports most recent attack began on 1/10/2020 with abdominal pain in RUQ radiating to her back, N/V, and diarrhea. She denies jaundice or fever. WBC reportedly 13 by PCP. Ultrasound demonstrates gallstones and cholecystitis. General surgery was consulted for evaluation. Pt is on home O2- 3L NC overnight. US ABD 1/14/2020:  FINDINGS:   LIVER: 19.5 cm. Normal echogenicity. No masses. BILE DUCTS: No intrahepatic bile duct dilatation. CBD diameter = 6 mm. GALLBLADDER: The gallbladder wall is thickened and edematous, 1.5 cm. There is  cholelithiasis. PANCREAS: Normal.  SPLEEN: Normal.     RIGHT KIDNEY: 10.5 cm. No mass or hydronephrosis. LEFT KIDNEY: 10.7 cm. No mass or hydronephrosis. ABDOMINAL AORTA AND IVC: Normal in size. ASCITES: No free fluid.     IMPRESSION  IMPRESSION: Gallbladder wall thickening and cholelithiasis, likely acute  Cholecystitis. I have personally performed a face-to-face diagnostic evaluation and management  service on this patient. I have independently seen the patient. I have independently obtained the above history from the patient/family. I have independently examined the patient with above findings. I have independently reviewed data/labs for this patient and developed the above plan of care (MDM). Walter Ware MD            Past Medical History:   Diagnosis Date    COPD     Emphysema     Essential hypertension 6/10/2018    GERD (gastroesophageal reflux disease) 6/10/2018    Psychiatric disorder     anxiety, depression     History reviewed.  No pertinent surgical history. Current Facility-Administered Medications   Medication Dose Route Frequency    0.9% sodium chloride infusion  100 mL/hr IntraVENous CONTINUOUS    HYDROmorphone (PF) (DILAUDID) injection 1 mg  1 mg IntraVENous Q3H PRN    ondansetron (ZOFRAN) injection 4 mg  4 mg IntraVENous Q4H PRN    piperacillin-tazobactam (ZOSYN) 3.375 g in 0.9% sodium chloride (MBP/ADV) 100 mL  3.375 g IntraVENous Q8H     Aspirin; Codeine; Iodine; Nuts [tree nut]; Sodium benzoate; and Sulfa (sulfonamide antibiotics)  Social History     Socioeconomic History    Marital status:      Spouse name: Not on file    Number of children: Not on file    Years of education: Not on file    Highest education level: Not on file   Tobacco Use    Smoking status: Current Every Day Smoker     Packs/day: 2.00     Years: 50.00     Pack years: 100.00    Smokeless tobacco: Never Used    Tobacco comment: currently smoking <1/4 ppd    Substance and Sexual Activity    Alcohol use: No    Drug use: No   Social History Narrative     and lives alone. Retired. Previously worked as pharmacy tech. Has lived in [place], [place], and [place] Has 3 dogs. Social History     Tobacco Use   Smoking Status Current Every Day Smoker    Packs/day: 2.00    Years: 50.00    Pack years: 100.00   Smokeless Tobacco Never Used   Tobacco Comment    currently smoking <1/4 ppd      Family History   Problem Relation Age of Onset    Breast Cancer Mother 61    Breast Cancer Sister 39    Breast Cancer Maternal Aunt      ROS: The patient has no difficulty with chest pain. + COPD. No fever or chills. Comprehensive review of systems was otherwise unremarkable except as noted above.     Physical Exam:   Visit Vitals  BP 98/58 (BP 1 Location: Right arm, BP Patient Position: At rest) Comment: pt states normal for her   Pulse 72   Temp 98.5 °F (36.9 °C)   Resp 18   Ht 5' 3.5\" (1.613 m)   Wt 197 lb (89.4 kg)   SpO2 97%   BMI 34.35 kg/m²     Constitutional: Alert, oriented, cooperative patient in no acute distress; appears stated age    Eyes:Sclera are clear. EOMs intact  ENMT: no external lesions gross hearing normal; no obvious neck masses, no ear or lip lesions, nares normal  CV: RRR. Normal perfusion  Resp: No JVD.  + bilateral wheezes. GI: soft and non-distended; tender RUQ and LUQ. Musculoskeletal: unremarkable with normal function. No embolic signs or cyanosis. Neuro:  Oriented; moves all 4; no focal deficits  Psychiatric: normal affect and mood, no memory impairment    Recent vitals (if inpt):  Patient Vitals for the past 24 hrs:   BP Temp Pulse Resp SpO2 Height Weight   01/15/20 0732 98/58 98.5 °F (36.9 °C) 72 18 97 %     01/15/20 0334 92/61 98.2 °F (36.8 °C) 73 18 94 %     01/14/20 2338 93/61 98.4 °F (36.9 °C) 65 18 95 %     01/14/20 2046 108/67 98.3 °F (36.8 °C) 85 18 96 %     01/14/20 2003     95 %     01/14/20 2001   82  95 %     01/14/20 1958 119/56  87       01/14/20 1917     93 %     01/14/20 1815 97/67 98.2 °F (36.8 °C) 64 18 92 % 5' 3.5\" (1.613 m) 197 lb (89.4 kg)       Labs:  Recent Labs     01/14/20  1921   LPSE 150   TROIQ <0.02*       Lab Results   Component Value Date/Time    WBC 13.9 (H) 10/28/2019 02:03 PM    HGB 13.9 10/28/2019 02:03 PM    PLATELET 035 87/78/7633 02:03 PM    Sodium 137 10/28/2019 02:03 PM    Potassium 4.0 10/28/2019 02:03 PM    Chloride 100 10/28/2019 02:03 PM    CO2 29 10/28/2019 02:03 PM    BUN 21 10/28/2019 02:03 PM    Creatinine 0.95 10/28/2019 02:03 PM    Glucose 102 (H) 10/28/2019 02:03 PM    Bilirubin, total 0.4 10/28/2019 02:03 PM    AST (SGOT) 10 (L) 10/28/2019 02:03 PM    ALT (SGPT) 22 10/28/2019 02:03 PM    Alk. phosphatase 78 10/28/2019 02:03 PM    Lipase 150 01/14/2020 07:21 PM    Troponin-I, Qt. <0.02 (L) 01/14/2020 07:21 PM       I reviewed recent labs and recent radiologic studies.     I independently reviewed radiology images for studies I described above or studies I have ordered. Admission date (for inpatients): 1/14/2020   * No surgery date entered *  Procedure(s):  CHOLECYSTECTOMY LAPAROSCOPIC  ROOM 361    ASSESSMENT/PLAN:  Problem List  Date Reviewed: 11/9/2019          Codes Class Noted    Cholecystitis ICD-10-CM: K81.9  ICD-9-CM: 575.10  1/14/2020        Obesity (Chronic) ICD-10-CM: E66.9  ICD-9-CM: 278.00  6/30/2019        COPD, very severe ([address] ICD-10-CM: J44.9  ICD-9-CM: 534  6/10/2018        Major depressive disorder ICD-10-CM: F32.9  ICD-9-CM: 296.20  6/10/2018        Family history of breast cancer ICD-10-CM: Z80.3  ICD-9-CM: V16.3  6/10/2018        Cigarette nicotine dependence without complication IJR-60-QO: B69.088  ICD-9-CM: 305.1  6/10/2018        Class 1 obesity due to excess calories in adult ICD-10-CM: E66.09  ICD-9-CM: 278.00  6/10/2018        Essential hypertension ICD-10-CM: I10  ICD-9-CM: 401.9  6/10/2018        GERD (gastroesophageal reflux disease) ICD-10-CM: K21.9  ICD-9-CM: 530.81  6/10/2018        Chronic fatigue ICD-10-CM: R53.82  ICD-9-CM: 780.79  6/10/2018        Polycythemia ICD-10-CM: D75.1  ICD-9-CM: 238.4  6/10/2018            Active Problems:    Cholecystitis (1/14/2020)       Plan:    NPO  IVFs  Pain/nausea control  Consent  Lap juan c this afternoon with Dr. Torito Lam      Signed:  HEATHER Yost     I have personally performed a face-to-face diagnostic evaluation and management  service on this patient. I have independently seen the patient. I have independently obtained the above history from the patient/family. I have independently examined the patient with above findings. I have independently reviewed data/labs for this patient and developed the above plan of care (MDM).     Katie Vidal MD

## 2020-01-15 NOTE — ANESTHESIA PREPROCEDURE EVALUATION
Relevant Problems   No relevant active problems       Anesthetic History   No history of anesthetic complications            Review of Systems / Medical History  Patient summary reviewed and pertinent labs reviewed    Pulmonary    COPD: moderate      Undiagnosed apnea      Comments: D/Scott cigs X 3 months   Neuro/Psych              Cardiovascular    Hypertension              Exercise tolerance: <4 METS     GI/Hepatic/Renal     GERD           Endo/Other        Morbid obesity     Other Findings            Physical Exam    Airway  Mallampati: II  TM Distance: > 6 cm  Neck ROM: normal range of motion   Mouth opening: Normal     Cardiovascular    Rhythm: regular           Dental    Dentition: Poor dentition     Pulmonary                 Abdominal  GI exam deferred       Other Findings            Anesthetic Plan    ASA: 3  Anesthesia type: general          Induction: Intravenous  Anesthetic plan and risks discussed with: Patient      Justine intraop

## 2020-01-15 NOTE — PROGRESS NOTES
Admission assessment completed via doc flow sheet. Pt A & O x 4. Lungs CTA, HR WNL, NSR. Abdomen soft and tender. No c/o pain at this time. IVS c/d/i, no s/sx of infection/infiltration noted. Pt able to make needs known. No concerns at this time. Bed low and locked. Call light within reach. Will continue to monitor.

## 2020-01-15 NOTE — PROGRESS NOTES
Care Management Interventions  PCP Verified by CM: Yes  Mode of Transport at Discharge: Other (see comment)  Transition of Care Consult (CM Consult): Other  Current Support Network: Own Home(pt's mother lives with her, she cares for her)  Confirm Follow Up Transport: Family  The Patient and/or Patient Representative was Provided with a Choice of Provider and Agrees with the Discharge Plan?: Yes  Freedom of Choice List was Provided with Basic Dialogue that Supports the Patient's Individualized Plan of Care/Goals, Treatment Preferences and Shares the Quality Data Associated with the Providers?: Yes  Discharge Location  Discharge Placement: Home  Visited with pt regarding plans for discharge, pt states that she lives at home, where she care for her elderly mother. Pt is indep with ADL's, drives and cooks. Pt sees Dr. Jennifer Bowden was yesterday, she was sent to the ED by her doctor. Per  The PA note form 1/15, pt is to have a Lap Lulu later today. PT has no needs at this time, but CM will continue to follow.

## 2020-01-15 NOTE — PROGRESS NOTES
Interdisciplinary team rounds were held 1/15/2020 with the following team members:Care Management, Nursing, Nutrition, Pharmacy, Physical Therapy and Physician. Plan of care discussed. See clinical pathway and/or care plan for interventions and desired outcomes.

## 2020-01-15 NOTE — ANESTHESIA POSTPROCEDURE EVALUATION
Procedure(s):  CHOLECYSTECTOMY LAPAROSCOPIC  ROOM 361.     general    Anesthesia Post Evaluation      Multimodal analgesia: multimodal analgesia used between 6 hours prior to anesthesia start to PACU discharge  Patient location during evaluation: PACU  Patient participation: complete - patient participated  Level of consciousness: awake  Pain management: adequate  Airway patency: patent  Anesthetic complications: no  Cardiovascular status: acceptable  Respiratory status: acceptable  Hydration status: acceptable  Post anesthesia nausea and vomiting:  none      Vitals Value Taken Time   /57 1/15/2020  6:45 PM   Temp     Pulse 78 1/15/2020  6:45 PM   Resp 14 1/15/2020  6:45 PM   SpO2 97 % 1/15/2020  6:45 PM

## 2020-01-15 NOTE — ROUTINE PROCESS
TRANSFER - OUT REPORT: 
 
Verbal report given to SANTI[employer]O [employer](name) on [facility]  being transferred to [facility] floor(unit) for routine post - op Report consisted of patients Situation, Background, Assessment and  
Recommendations(SBAR). Information from the following report(s) OR Summary, Procedure Summary, Intake/Output and MAR was reviewed with the receiving nurse. Lines:  
Peripheral IV 01/14/20 Left Antecubital (Active) Site Assessment Clean, dry, & intact 1/15/2020  6:31 PM  
Phlebitis Assessment 0 1/15/2020  6:31 PM  
Infiltration Assessment 0 1/15/2020  6:31 PM  
Dressing Status Clean, dry, & intact 1/15/2020  6:31 PM  
Dressing Type Transparent 1/15/2020  6:31 PM  
Hub Color/Line Status Pink; Infusing 1/15/2020  6:31 PM  
Alcohol Cap Used No 1/15/2020  6:31 PM  
  
 
Opportunity for questions and clarification was provided. Patient transported with: 
 O2 @ 3 liters

## 2020-01-15 NOTE — PROGRESS NOTES
01/14/20 2108   Dual Skin Pressure Injury Assessment   Dual Skin Pressure Injury Assessment WDL   Second Care Provider (Based on Facility Policy) Gris Miller RN   Skin Integumentary   Skin Integumentary (WDL) WDL

## 2020-01-15 NOTE — PROGRESS NOTES
Pt with no new complaints overnight. Denies discomfort at this time. Ambulatory to bathroom. NPO for surgery this afternoon.

## 2020-01-15 NOTE — ED NOTES
TRANSFER - OUT REPORT:    Verbal report given to [facility] RN on [facility]  being transferred to room 361 for routine progression of care       Report consisted of patients Situation, Background, Assessment and   Recommendations(SBAR). Information from the following report(s) SBAR was reviewed with the receiving nurse. Lines:   Peripheral IV 01/14/20 Left Antecubital (Active)   Site Assessment Clean, dry, & intact 1/14/2020  7:40 PM   Phlebitis Assessment 0 1/14/2020  7:40 PM   Infiltration Assessment 0 1/14/2020  7:40 PM   Dressing Status Clean, dry, & intact 1/14/2020  7:40 PM        Opportunity for questions and clarification was provided.       Patient transported with:   [employer]

## 2020-01-15 NOTE — PERIOP NOTES
TRANSFER - IN REPORT:    Verbal report received from [address] [place] [address] on [facility]  being received from [facility], Med-Surg for ordered procedure      Report consisted of patients Situation, Background, Assessment and   Recommendations(SBAR). Information from the following report(s) SBAR, Kardex and MAR was reviewed with the receiving nurse. Opportunity for questions and clarification was provided. Assessment completed upon patients arrival to unit and care assumed.

## 2020-01-15 NOTE — ED PROVIDER NOTES
28-year-old female presents with abdominal pain onset Friday January 10. She has some nausea vomiting. And developed some postprandial diarrhea through the weekend. Patient was seen by her PCP today outpatient lab work revealed a white count of 13.6 with normal LFTs. Outpatient ultrasound reveals cholelithiasis and a thickened edematous gallbladder wall suggestive of acute cholecystitis. Denies any UTI symptoms           Past Medical History:   Diagnosis Date    COPD     Emphysema     Essential hypertension 6/10/2018    GERD (gastroesophageal reflux disease) 6/10/2018    Psychiatric disorder     anxiety, depression       History reviewed. No pertinent surgical history.       Family History:   Problem Relation Age of Onset    Breast Cancer Mother 61    Breast Cancer Sister 39    Breast Cancer Maternal Aunt        Social History     Socioeconomic History    Marital status:      Spouse name: Not on file    Number of children: Not on file    Years of education: Not on file    Highest education level: Not on file   Occupational History    Not on file   Social Needs    Financial resource strain: Not on file    Food insecurity:     Worry: Not on file     Inability: Not on file    Transportation needs:     Medical: Not on file     Non-medical: Not on file   Tobacco Use    Smoking status: Current Every Day Smoker     Packs/day: 2.00     Years: 50.00     Pack years: 100.00    Smokeless tobacco: Never Used    Tobacco comment: currently smoking <1/4 ppd    Substance and Sexual Activity    Alcohol use: No    Drug use: No    Sexual activity: Not on file   Lifestyle    Physical activity:     Days per week: Not on file     Minutes per session: Not on file    Stress: Not on file   Relationships    Social connections:     Talks on phone: Not on file     Gets together: Not on file     Attends Tenriism service: Not on file     Active member of club or organization: Not on file     Attends meetings of clubs or organizations: Not on file     Relationship status: Not on file    Intimate partner violence:     Fear of current or ex partner: Not on file     Emotionally abused: Not on file     Physically abused: Not on file     Forced sexual activity: Not on file   Other Topics Concern    Not on file   Social History Narrative     and lives alone. Retired. Previously worked as pharmacy tech. Has lived in [facility] and [place] Has 3 dogs. ALLERGIES: Aspirin; Codeine; Iodine; Nuts [tree nut]; Sodium benzoate; and Sulfa (sulfonamide antibiotics)    Review of Systems   Constitutional: Negative for chills and fever. HENT: Negative for rhinorrhea and sore throat. Eyes: Negative for discharge and redness. Respiratory: Positive for cough and wheezing. Negative for shortness of breath. Cardiovascular: Negative for chest pain and palpitations. Gastrointestinal: Positive for abdominal pain, diarrhea, nausea and vomiting. Genitourinary: Negative for difficulty urinating and dysuria. Musculoskeletal: Negative for arthralgias and back pain. Skin: Negative for rash. Neurological: Negative for dizziness and headaches. All other systems reviewed and are negative. Vitals:    01/14/20 1815 01/14/20 1917   BP: 97/67    Pulse: 64    Resp: 18    Temp: 98.2 °F (36.8 °C)    SpO2: 92% 93%   Weight: 89.4 kg (197 lb)    Height: 5' 3.5\" (1.613 m)             Physical Exam  Vitals signs and nursing note reviewed. Constitutional:       General: She is not in acute distress. Appearance: Normal appearance. She is well-developed. She is not ill-appearing, toxic-appearing or diaphoretic. HENT:      Head: Normocephalic and atraumatic. Eyes:      General: No scleral icterus. Right eye: No discharge. Left eye: No discharge. Conjunctiva/sclera: Conjunctivae normal.      Pupils: Pupils are equal, round, and reactive to light.    Neck:      Musculoskeletal: Normal range of motion and neck supple. Cardiovascular:      Rate and Rhythm: Normal rate and regular rhythm. Heart sounds: Normal heart sounds. No murmur. No gallop. Pulmonary:      Effort: Pulmonary effort is normal. No respiratory distress. Breath sounds: Normal breath sounds. No wheezing or rales. Abdominal:      General: Bowel sounds are normal.      Palpations: Abdomen is soft. Tenderness: There is generalized tenderness and tenderness in the right upper quadrant. There is no guarding. Musculoskeletal: Normal range of motion. Skin:     General: Skin is warm and dry. Neurological:      General: No focal deficit present. Mental Status: She is alert and oriented to person, place, and time. Mental status is at baseline. Motor: No abnormal muscle tone. Comments: cni 2-12 grossly   Psychiatric:         Mood and Affect: Mood normal.         Behavior: Behavior normal.          MDM  Number of Diagnoses or Management Options  Acute cholecystitis:   Chronic obstructive pulmonary disease, unspecified COPD type ([facility] [address]): established and worsening  Diagnosis management comments: Medical decision making note:  Acute cholecystitis  disCussed with surgeon who will admit  This concludes the \"medical decision making note\" part of this emergency department visit note.            Procedures

## 2020-01-15 NOTE — OP NOTES
[address]  [phone]    [address] date: 2020    MRN: [id number]     : 1950     Age: 81 y.o.          1/15/2020 6:22 PM    Cholecystectomy     Indications: Pt presented with acute cholecystitis. Risks, benefits, and alternatives to cholecystectomy were discussed with the patient. Appropriate consent was obtained. Pre-Op Diagnosis: gallbladder    Post-Op Diagnosis:  gallbladder      Surgeon: Damian Hernandez MD    Assistants: Surgeon(s):  Rikki Haq MD    Anesthesia:  General      Findings: as above, gallstones    Estimated Blood Loss:    10ml          Specimens: gallbladder and stones           Implants: * No implants in log *      Procedure Details     The patient was brought to the operating room and placed supine. After induction of a general anesthetic, the abdomen was prepped and draped in standard fashion. An incision was made in the umbilicus and a Tala trocar was placed in the usual fashion after which  the abdomen was insufflated to 15mmHg sterile CO2. The other trocars were then placed under direct vision. These were positioned four fingerbreadths below the right costal margin in the anterior axillary line and mid clavicular line and just to the right of the falciform ligament in the epigatrium. The gallbladder was grasped at the dome and retracted laterally and to the right in order to gain the critical view. Dissection was begun with maryland dissector at the infundibulum of the gallbladder until the cystic duct was positively identified as a singular structure going directly into the gallbladder. The cystic duct was then clipped twice inferiorly and superiorly and divided between with scissors. The cystic artery was then clipped and divided in the cystic triangle. The gallbladder was then taken off the liver bed with electrocautery hook.   No gross spillage of bile was encountered during the dissection. The gallbladder was then removed from the superior port site. The gallbladder was sent to pathology for evaluation at this point. The gallbladder fossa was without evidence of bleeding, there was no bleeding from the cystic artery stump and there was no evidence of bile leak from the cystic duct stump. The three upper abdominal trocars were removed under the direct vision without bleeding from the trocar sites. The Tala trocar was removed and the fascia of the umbilicus was closed with 0'0  Vicryl. The trocar sites were closed with the subcuticular Monocryl dermabond dressing applied. The patient was taken to the recovery room in good condition, having tolerated the procedure well. Instrument, sponge, and needle counts were correct prior to abdominal closure and at the conclusion of the case.      Signed: Reynold Knight MD

## 2020-01-16 VITALS
RESPIRATION RATE: 18 BRPM | HEART RATE: 78 BPM | SYSTOLIC BLOOD PRESSURE: 105 MMHG | OXYGEN SATURATION: 97 % | BODY MASS INDEX: 33.63 KG/M2 | WEIGHT: 197 LBS | HEIGHT: 64 IN | DIASTOLIC BLOOD PRESSURE: 60 MMHG | TEMPERATURE: 97.5 F

## 2020-01-16 PROCEDURE — 74011250637 HC RX REV CODE- 250/637: Performed by: SURGERY

## 2020-01-16 PROCEDURE — 74011250636 HC RX REV CODE- 250/636: Performed by: SURGERY

## 2020-01-16 PROCEDURE — 74011000258 HC RX REV CODE- 258: Performed by: SURGERY

## 2020-01-16 PROCEDURE — 74011000250 HC RX REV CODE- 250: Performed by: FAMILY MEDICINE

## 2020-01-16 PROCEDURE — 94640 AIRWAY INHALATION TREATMENT: CPT

## 2020-01-16 PROCEDURE — 77030013140 HC MSK NEB VYRM -A

## 2020-01-16 PROCEDURE — 99218 HC RM OBSERVATION: CPT

## 2020-01-16 PROCEDURE — 77010033678 HC OXYGEN DAILY

## 2020-01-16 PROCEDURE — 94760 N-INVAS EAR/PLS OXIMETRY 1: CPT

## 2020-01-16 RX ORDER — OXYCODONE AND ACETAMINOPHEN 5; 325 MG/1; MG/1
1 TABLET ORAL
Qty: 25 TAB | Refills: 0 | Status: SHIPPED | OUTPATIENT
Start: 2020-01-16 | End: 2020-01-21

## 2020-01-16 RX ORDER — ONDANSETRON HYDROCHLORIDE 8 MG/1
8 TABLET, FILM COATED ORAL
Qty: 20 TAB | Refills: 0 | Status: SHIPPED | OUTPATIENT
Start: 2020-01-16 | End: 2021-04-07

## 2020-01-16 RX ORDER — ALBUTEROL SULFATE 0.83 MG/ML
2.5 SOLUTION RESPIRATORY (INHALATION)
Status: DISCONTINUED | OUTPATIENT
Start: 2020-01-16 | End: 2020-01-16 | Stop reason: HOSPADM

## 2020-01-16 RX ADMIN — PIPERACILLIN AND TAZOBACTAM 3.38 G: 3; .375 INJECTION, POWDER, LYOPHILIZED, FOR SOLUTION INTRAVENOUS at 03:38

## 2020-01-16 RX ADMIN — ALBUTEROL SULFATE 2.5 MG: 2.5 SOLUTION RESPIRATORY (INHALATION) at 08:19

## 2020-01-16 RX ADMIN — OXYCODONE HYDROCHLORIDE AND ACETAMINOPHEN 1 TABLET: 5; 325 TABLET ORAL at 08:58

## 2020-01-16 RX ADMIN — PANTOPRAZOLE SODIUM 40 MG: 40 TABLET, DELAYED RELEASE ORAL at 08:44

## 2020-01-16 RX ADMIN — HYDROMORPHONE HYDROCHLORIDE 1 MG: 1 INJECTION, SOLUTION INTRAMUSCULAR; INTRAVENOUS; SUBCUTANEOUS at 03:38

## 2020-01-16 NOTE — PROGRESS NOTES
Discharge instructions were reviewed with the patient. Opportunity for questions given. Patient verbalized understanding of discharge and follow up instructions, as well as S/S to report to MD or return to ER for. PIV was removed. Prescriptions provided. Patient will D/C to home. Ride here around noon.

## 2020-01-16 NOTE — DISCHARGE INSTRUCTIONS
Discharge Instructions/Follow-up Plans:   MD Instructions:     Follow-up with [facility], NP or HEATHER Messina for Dr. Torito Lam in 1 week. Keep incisions clean and dry, may remain uncovered. Do not apply lotions, creams or ointments to incisions.     Diet - as tolerated - Soft foods diet. Activity - ambulate - as tolerated - no heavy lifting >10lb. May shower - no tub baths or soaking/submerging.     No driving while taking narcotics. Do not drink alcohol while taking narcotics. Resume other home medications.      If problems or questions arise, please call our office at [phone].     Greater than 30 minutes were spent discharging the patient       DISCHARGE SUMMARY from Nurse    PATIENT INSTRUCTIONS:    After general anesthesia or intravenous sedation, for 24 hours or while taking prescription Narcotics:  · Limit your activities  · Do not drive and operate hazardous machinery  · Do not make important personal or business decisions  · Do  not drink alcoholic beverages  · If you have not urinated within 8 hours after discharge, please contact your surgeon on call. Report the following to your surgeon:  · Excessive pain, swelling, redness or odor of or around the surgical area  · Temperature over 100.5  · Nausea and vomiting lasting longer than 4 hours or if unable to take medications  · Any signs of decreased circulation or nerve impairment to extremity: change in color, persistent  numbness, tingling, coldness or increase pain  · Any questions    What to do at Home:  Recommended activity: Activity as tolerated, see above instructions     If you experience any of the following symptoms: fever, chills, s/s of wound infection; severe pain, nausea, vomiting; other concerning s/s, please follow up with surgeon. *  Please give a list of your current medications to your Primary Care Provider.     *  Please update this list whenever your medications are discontinued, doses are      changed, or new medications (including over-the-counter products) are added. *  Please carry medication information at all times in case of emergency situations. These are general instructions for a healthy lifestyle:    No smoking/ No tobacco products/ Avoid exposure to second hand smoke  Surgeon General's Warning:  Quitting smoking now greatly reduces serious risk to your health. Obesity, smoking, and sedentary lifestyle greatly increases your risk for illness    A healthy diet, regular physical exercise & weight monitoring are important for maintaining a healthy lifestyle    You may be retaining fluid if you have a history of heart failure or if you experience any of the following symptoms:  Weight gain of 3 pounds or more overnight or 5 pounds in a week, increased swelling in our hands or feet or shortness of breath while lying flat in bed. Please call your doctor as soon as you notice any of these symptoms; do not wait until your next office visit. The discharge information has been reviewed with the patient. The patient verbalized understanding. Discharge medications reviewed with the patient and appropriate educational materials and side effects teaching were provided. ___________________________________________________________________________________________________________________________________       Patient Education        Cholecystectomy: What to Expect at Home  Your Recovery  After your surgery, it is normal to feel weak and tired for several days after you return home. Your belly may be swollen. If you had laparoscopic surgery, you may also have pain in your shoulder for about 24 hours. You may have gas or need to burp a lot at first, and a few people get diarrhea. The diarrhea usually goes away in 2 to 4 weeks, but it may last longer. How quickly you recover depends on whether you had a laparoscopic or open surgery.   · For a laparoscopic surgery, most people can go back to work or their normal routine in 1 to 2 weeks, but it may take longer, depending on the type of work you do. · For an open surgery, it will probably take 4 to 6 weeks before you get back to your normal routine. This care sheet gives you a general idea about how long it will take for you to recover. However, each person recovers at a different pace. Follow the steps below to get better as quickly as possible. How can you care for yourself at home? Activity    · Rest when you feel tired. Getting enough sleep will help you recover.     · Try to walk each day. Start out by walking a little more than you did the day before. Gradually increase the amount you walk. Walking boosts blood flow and helps prevent pneumonia and constipation.     · For about 2 to 4 weeks, avoid lifting anything that would make you strain. This may include a child, heavy grocery bags and milk containers, a heavy briefcase or backpack, cat litter or dog food bags, or a vacuum .     · Avoid strenuous activities, such as biking, jogging, weightlifting, and aerobic exercise, until your doctor says it is okay.     · You may shower 24 to 48 hours after surgery, if your doctor okays it. Pat the cut (incision) dry. Do not take a bath for the first 2 weeks, or until your doctor tells you it is okay.     · You may drive when you are no longer taking pain medicine and can quickly move your foot from the gas pedal to the brake. You must also be able to sit comfortably for a long period of time, even if you do not plan to go far. You might get caught in traffic.     · For a laparoscopic surgery, most people can go back to work or their normal routine in 1 to 2 weeks, but it may take longer. For an open surgery, it will probably take 4 to 6 weeks before you get back to your normal routine.     · Your doctor will tell you when you can have sex again.    Diet    · Eat smaller meals more often instead of fewer larger meals.  You can eat a normal diet, but avoid eating fatty foods for about 1 month. Fatty foods include hamburger, whole milk, cheese, and many snack foods. If your stomach is upset, try bland, low-fat foods like plain rice, broiled chicken, toast, and yogurt.     · Drink plenty of fluids (unless your doctor tells you not to).   · If you have diarrhea, try avoiding spicy foods, dairy products, fatty foods, and alcohol. You can also watch to see if specific foods cause it, and stop eating them. If the diarrhea continues for more than 2 weeks, talk to your doctor.     · You may notice that your bowel movements are not regular right after your surgery. This is common. Try to avoid constipation and straining with bowel movements. You may want to take a fiber supplement every day. If you have not had a bowel movement after a couple of days, ask your doctor about taking a mild laxative. Medicines    · Your doctor will tell you if and when you can restart your medicines. He or she will also give you instructions about taking any new medicines.     · If you take blood thinners, such as warfarin (Coumadin), clopidogrel (Plavix), or aspirin, be sure to talk to your doctor. He or she will tell you if and when to start taking those medicines again. Make sure that you understand exactly what your doctor wants you to do.     · Take pain medicines exactly as directed. ? If the doctor gave you a prescription medicine for pain, take it as prescribed. ? If you are not taking a prescription pain medicine, take an over-the-counter medicine such as acetaminophen (Tylenol), ibuprofen (Advil, Motrin), or naproxen (Aleve). Read and follow all instructions on the label. ? Do not take two or more pain medicines at the same time unless the doctor told you to. Many pain medicines contain acetaminophen, which is Tylenol. Too much Tylenol can be harmful.     · If you think your pain medicine is making you sick to your stomach:  ? Take your medicine after meals (unless your doctor tells you not to). ?  Ask your doctor for a different pain medicine.     · If your doctor prescribed antibiotics, take them as directed. Do not stop taking them just because you feel better. You need to take the full course of antibiotics. Incision care    · If you have strips of tape on the incision, or cut, leave the tape on for a week or until it falls off.     · After 24 to 48 hours, wash the area daily with warm, soapy water, and pat it dry.     · You may have staples to hold the cut together. Keep them dry until your doctor takes them out. This is usually in 7 to 10 days.     · Keep the area clean and dry. You may cover it with a gauze bandage if it weeps or rubs against clothing. Change the bandage every day.    Ice    · To reduce swelling and pain, put ice or a cold pack on your belly for 10 to 20 minutes at a time. Do this every 1 to 2 hours. Put a thin cloth between the ice and your skin. Follow-up care is a key part of your treatment and safety. Be sure to make and go to all appointments, and call your doctor if you are having problems. It's also a good idea to know your test results and keep a list of the medicines you take. When should you call for help? Call 911 anytime you think you may need emergency care. For example, call if:    · You passed out (lost consciousness).     · You are short of breath. Theresa Pinky your doctor now or seek immediate medical care if:    · You are sick to your stomach and cannot drink fluids.     · You have pain that does not get better when you take your pain medicine.     · You cannot pass stools or gas.     · You have signs of infection, such as:  ? Increased pain, swelling, warmth, or redness. ? Red streaks leading from the incision. ? Pus draining from the incision. ?  A fever.     · Bright red blood has soaked through the bandage over your incision.     · You have loose stitches, or your incision comes open.     · You have signs of a blood clot in your leg (called a deep vein thrombosis), such as:  ? Pain in your calf, back of knee, thigh, or groin. ? Redness and swelling in your leg or groin.    Watch closely for any changes in your health, and be sure to contact your doctor if you have any problems. Where can you learn more? Go to http://phi-dev.info/. Enter [phone] in the search box to learn more about \"Cholecystectomy: What to Expect at Home. \"  Current as of: November 7, 2018  Content Version: 12.2  © 6996-2852 [employer]. Care instructions adapted under license by [employer] (which disclaims liability or warranty for this information). If you have questions about a medical condition or this instruction, always ask your healthcare professional. [address] any warranty or liability for your use of this information.

## 2020-01-16 NOTE — DISCHARGE SUMMARY
[address] [facility]  [phone]   Discharge Summary     Benja Sousa  MRN: [id number]     : 1950     Age: 81 y.o. Admit date: 2020     Discharge date: 2020  Attending Physician: Dr. Klarissa Rahman  Primary Discharge Diagnosis:   Principal Problem:    Cholecystitis (2020)      Primary Operations or Procedures Performed :  Procedure(s):  CHOLECYSTECTOMY LAPAROSCOPIC  CVBN 886     Brief History and Reason for Admission: Benja Sousa was admitted with the following history of present illness. HPI: Benja Sousa is a 81 y.o. female who has PMHx of COPD, HTN, GERD, anxiety, and depression we are asked by ED to see for cholecystitis. Pt reports a h/o 3 gallbladder attacks in the last year. She has known gallstones but did not seek surgery due to insurance issues. Pt reports most recent attack began on 1/10/2020 with abdominal pain in RUQ radiating to her back, N/V, and diarrhea. She denies jaundice or fever. WBC reportedly 13 by PCP. Ultrasound demonstrates gallstones and cholecystitis. General surgery was consulted for evaluation.     Pt is on home O2- 3L NC overnight.      US ABD 2020:  FINDINGS:   LIVER: 19.5 cm.  Normal echogenicity.  No masses. BILE DUCTS: No intrahepatic bile duct dilatation.  CBD diameter = 6 mm. GALLBLADDER: The gallbladder wall is thickened and edematous, 1.5 cm.  There is  cholelithiasis. PANCREAS: Normal.  SPLEEN: Normal.     RIGHT KIDNEY: 10.5 cm.  No mass or hydronephrosis. LEFT KIDNEY: 10.7 cm.  No mass or hydronephrosis. ABDOMINAL AORTA AND IVC: Normal in size. ASCITES: No free fluid.     IMPRESSION  IMPRESSION: Gallbladder wall thickening and cholelithiasis, likely acute  Cholecystitis. Hospital Course:  On , Dr. Klarissa Rahman performed laparoscopic cholecystectomy. 2020 POD#1- AF, VSS, on 3LNC (baseline O2 needs at home).  Good UOP overnight. -N/V, pain controlled. The patient progressed satisfactorily meeting milestones necessary for successful discharge including tolerating a diet, adequate mobility, adequate pain control, and active bowel function. Patient was deemed a good candidate for discharge at the time of morning rounding. They are to follow up as indicated in their provided discharge paperwork. The patient helped develop and voices understanding with the plan of care. They are amenable without reservations at this time to moving forward with discharge. Condition at Discharge: Good    Discharge Medications:   Current Discharge Medication List      START taking these medications    Details   oxyCODONE-acetaminophen (PERCOCET) 5-325 mg per tablet Take 1 Tab by mouth every four (4) hours as needed for Pain for up to 5 days. Max Daily Amount: 6 Tabs. Qty: 25 Tab, Refills: 0    Associated Diagnoses: Cholecystitis      ondansetron hcl (ZOFRAN) 8 mg tablet Take 1 Tab by mouth every eight (8) hours as needed for Nausea or Vomiting. Indications: prevent nausea and vomiting after surgery  Qty: 20 Tab, Refills: 0         CONTINUE these medications which have NOT CHANGED    Details   albuterol (PROVENTIL VENTOLIN) 2.5 mg /3 mL (0.083 %) nebu by Nebulization route. varenicline (CHANTIX) 1 mg tablet Take 1 mg by mouth two (2) times daily (after meals). Oxygen 3 lpm cont. pantoprazole (PROTONIX) 40 mg tablet Take 40 mg by mouth daily. cholecalciferol, VITAMIN D3, (VITAMIN D3) 5,000 unit tab tablet Take  by mouth daily. fluticasone-umeclidinium-vilanterol (TRELEGY ELLIPTA) 100-62.5-25 mcg inhaler Take 1 Puff by inhalation daily. Qty: 1 Inhaler, Refills: 11      aspirin delayed-release 81 mg tablet Take  by mouth daily.       albuterol (VENTOLIN HFA) 90 mcg/actuation inhaler Ventolin HFA 90 mcg/actuation aerosol inhaler      predniSONE (DELTASONE) 20 mg tablet 2 tabs po qd x 4 days, 1 and 1/2 tabs po qd x 4 days, 1 tab po qd x 4 days, 1/2 tab po qd x 4 days, or as directed. Qty: 30 Tab, Refills: 0      citalopram (CELEXA) 10 mg tablet Take 20 mg by mouth two (2) times a day. Disposition: good    Discharge Instructions/Follow-up Care:      Discharge Instructions/Follow-up Plans:   MD Instructions:     Follow-up with Adriana Campbell NP or HEATHER Wills for Dr. Gin Bauman in 1 week. Keep incisions clean and dry, may remain uncovered. Do not apply lotions, creams or ointments to incisions.     Diet - as tolerated - Soft foods diet. Activity - ambulate - as tolerated - no heavy lifting >10lb. May shower - no tub baths or soaking/submerging.     No driving while taking narcotics. Do not drink alcohol while taking narcotics.   Resume other home medications.      If problems or questions arise, please call our office at [phone].     Greater than 30 minutes were spent discharging the patient       Signed:  HEATHER Roberto   1/16/2020  7:30 AM

## 2020-01-16 NOTE — PROGRESS NOTES
Pt returned from PACU. Shift assessment completed via doc flow sheet. Pt A & O x 4. Lungs CTA, HR WNL, NSR. Abdomen soft and tender. 4 Lap sites noted with dermabond c/d/i. IVS c/d/i, no s/sx of infection/infiltration noted. Pt able to make needs known. No concerns at this time. Bed low and locked. Call light within reach. Will continue to monitor.

## 2020-01-16 NOTE — PROGRESS NOTES
H&P/Consult Note/Progress Note/Office Note:   Mayte Stone  MRN: [id number]  PRAVIN:9/48/0809  Age:69 y.o.    HPI: Mayte Stone is a 81 y.o. female who has PMHx of COPD, HTN, GERD, anxiety, and depression we are asked by ED to see for cholecystitis. Pt reports a h/o 3 gallbladder attacks in the last year. She has known gallstones but did not seek surgery due to insurance issues. Pt reports most recent attack began on 1/10/2020 with abdominal pain in RUQ radiating to her back, N/V, and diarrhea. She denies jaundice or fever. WBC reportedly 13 by PCP. Ultrasound demonstrates gallstones and cholecystitis. General surgery was consulted for evaluation. Pt is on home O2- 3L NC overnight. US ABD 1/14/2020:  FINDINGS:   LIVER: 19.5 cm. Normal echogenicity. No masses. BILE DUCTS: No intrahepatic bile duct dilatation. CBD diameter = 6 mm. GALLBLADDER: The gallbladder wall is thickened and edematous, 1.5 cm. There is  cholelithiasis. PANCREAS: Normal.  SPLEEN: Normal.     RIGHT KIDNEY: 10.5 cm. No mass or hydronephrosis. LEFT KIDNEY: 10.7 cm. No mass or hydronephrosis. ABDOMINAL AORTA AND IVC: Normal in size. ASCITES: No free fluid.     IMPRESSION  IMPRESSION: Gallbladder wall thickening and cholelithiasis, likely acute  Cholecystitis. 1/16/2020 POD#1- AF, VSS, on 3LNC (baseline O2 needs at home). Good UOP overnight. -N/V, pain controlled. Past Medical History:   Diagnosis Date    COPD     Emphysema     Essential hypertension 6/10/2018    GERD (gastroesophageal reflux disease) 6/10/2018    Psychiatric disorder     anxiety, depression     History reviewed. No pertinent surgical history.   Current Facility-Administered Medications   Medication Dose Route Frequency    albuterol (PROVENTIL VENTOLIN) nebulizer solution 2.5 mg  2.5 mg Nebulization Q6HWA RT    lactated Ringers infusion  75 mL/hr IntraVENous CONTINUOUS    pantoprazole (PROTONIX) tablet 40 mg  40 mg Oral DAILY    oxyCODONE-acetaminophen (PERCOCET) 5-325 mg per tablet 1 Tab  1 Tab Oral Q4H PRN    promethazine (PHENERGAN) tablet 25 mg  25 mg Oral Q6H PRN    ondansetron (ZOFRAN) injection 4 mg  4 mg IntraVENous Q4H PRN    0.9% sodium chloride infusion  100 mL/hr IntraVENous CONTINUOUS    HYDROmorphone (PF) (DILAUDID) injection 1 mg  1 mg IntraVENous Q3H PRN    ondansetron (ZOFRAN) injection 4 mg  4 mg IntraVENous Q4H PRN    piperacillin-tazobactam (ZOSYN) 3.375 g in 0.9% sodium chloride (MBP/ADV) 100 mL  3.375 g IntraVENous Q8H     Penicillins; Aspirin; Codeine; Iodine; Nuts [tree nut]; Sodium benzoate; and Sulfa (sulfonamide antibiotics)  Social History     Socioeconomic History    Marital status:      Spouse name: Not on file    Number of children: Not on file    Years of education: Not on file    Highest education level: Not on file   Tobacco Use    Smoking status: Current Every Day Smoker     Packs/day: 2.00     Years: 50.00     Pack years: 100.00    Smokeless tobacco: Never Used    Tobacco comment: currently smoking <1/4 ppd    Substance and Sexual Activity    Alcohol use: No    Drug use: No   Social History Narrative     and lives alone. Retired. Previously worked as pharmacy tech. Has lived in [place], [place], and [facility]. Has 3 dogs. Social History     Tobacco Use   Smoking Status Current Every Day Smoker    Packs/day: 2.00    Years: 50.00    Pack years: 100.00   Smokeless Tobacco Never Used   Tobacco Comment    currently smoking <1/4 ppd      Family History   Problem Relation Age of Onset    Breast Cancer Mother 61    Breast Cancer Sister 39    Breast Cancer Maternal Aunt      ROS: The patient has no difficulty with chest pain. + COPD. No fever or chills. Comprehensive review of systems was otherwise unremarkable except as noted above.     Physical Exam:   Visit Vitals  /61   Pulse 77   Temp 96.8 °F (36 °C)   Resp 20   Ht 5' 3.5\" (1.613 m)   Wt 197 lb (89.4 kg)   SpO2 91%   BMI 34.35 kg/m²     Constitutional: Alert, oriented, cooperative patient in no acute distress; appears stated age    Eyes:Sclera are clear. EOMs intact  ENMT: no external lesions gross hearing normal; no obvious neck masses, no ear or lip lesions, nares normal  CV: RRR. Normal perfusion  Resp: No JVD.  + bilateral wheezes. GI: soft and non-distended; appropriately tender. Incisions c/d/i. + ecchymosis. Musculoskeletal: unremarkable with normal function. No embolic signs or cyanosis. Neuro:  Oriented; moves all 4; no focal deficits  Psychiatric: normal affect and mood, no memory impairment    Recent vitals (if inpt):  Patient Vitals for the past 24 hrs:   BP Temp Pulse Resp SpO2   01/16/20 0401 112/61 96.8 °F (36 °C) 77 20 91 %   01/15/20 2357 110/68 96.8 °F (36 °C) 66 20 93 %   01/15/20 2000 108/61 96.7 °F (35.9 °C) 70 18 96 %   01/15/20 1900 112/60  80 16 97 %   01/15/20 1852 110/82  78 14 97 %   01/15/20 1845 109/57  78 14 97 %   01/15/20 1840 116/55  78 14 96 %   01/15/20 1835 115/55  80 14 96 %   01/15/20 1830 116/58 98 °F (36.7 °C) 75 14 93 %   01/15/20 1151 (!) 85/46 98 °F (36.7 °C) 70 17 95 %   01/15/20 0732 98/58 98.5 °F (36.9 °C) 72 18 97 %       Labs:  Recent Labs     01/14/20  1921   LPSE 150   TROIQ <0.02*       Lab Results   Component Value Date/Time    WBC 13.9 (H) 10/28/2019 02:03 PM    HGB 13.9 10/28/2019 02:03 PM    PLATELET 962 46/75/7257 02:03 PM    Sodium 137 10/28/2019 02:03 PM    Potassium 4.0 10/28/2019 02:03 PM    Chloride 100 10/28/2019 02:03 PM    CO2 29 10/28/2019 02:03 PM    BUN 21 10/28/2019 02:03 PM    Creatinine 0.95 10/28/2019 02:03 PM    Glucose 102 (H) 10/28/2019 02:03 PM    Bilirubin, total 0.4 10/28/2019 02:03 PM    AST (SGOT) 10 (L) 10/28/2019 02:03 PM    ALT (SGPT) 22 10/28/2019 02:03 PM    Alk.  phosphatase 78 10/28/2019 02:03 PM    Lipase 150 01/14/2020 07:21 PM    Troponin-I, Qt. <0.02 (L) 01/14/2020 07:21 PM       I reviewed recent labs and recent radiologic studies. I independently reviewed radiology images for studies I described above or studies I have ordered.    Admission date (for inpatients): 1/14/2020   * No surgery date entered *  Procedure(s):  CHOLECYSTECTOMY LAPAROSCOPIC  ROOM 361    ASSESSMENT/PLAN:  Problem List  Date Reviewed: 1/15/2020          Codes Class Noted    * (Principal) Cholecystitis ICD-10-CM: K81.9  ICD-9-CM: 575.10  1/14/2020        Obesity (Chronic) ICD-10-CM: E66.9  ICD-9-CM: 278.00  6/30/2019        COPD, very severe ([address] ICD-10-CM: J44.9  ICD-9-CM: 596  6/10/2018        Major depressive disorder ICD-10-CM: F32.9  ICD-9-CM: 296.20  6/10/2018        Family history of breast cancer ICD-10-CM: Z80.3  ICD-9-CM: V16.3  6/10/2018        Cigarette nicotine dependence without complication [facility]-04-ON: Z15.832  ICD-9-CM: 305.1  6/10/2018        Class 1 obesity due to excess calories in adult ICD-10-CM: E66.09  ICD-9-CM: 278.00  6/10/2018        Essential hypertension ICD-10-CM: I10  ICD-9-CM: 401.9  6/10/2018        GERD (gastroesophageal reflux disease) ICD-10-CM: K21.9  ICD-9-CM: 530.81  6/10/2018        Chronic fatigue ICD-10-CM: R53.82  ICD-9-CM: 780.79  6/10/2018        Polycythemia ICD-10-CM: D75.1  ICD-9-CM: 238.4  6/10/2018            Principal Problem:    Cholecystitis (1/14/2020)       Plan:  Discharge home today  Scripts in chart    Imani Banuelos

## 2020-01-16 NOTE — PROGRESS NOTES
Care Management Interventions  PCP Verified by CM: Yes  Mode of Transport at Discharge: Other (see comment)  Transition of Care Consult (CM Consult):  Other  Current Support Network: Own Home(pt's mother lives with her, she cares for her)  Confirm Follow Up Transport: Family  The Patient and/or Patient Representative was Provided with a Choice of Provider and Agrees with the Discharge Plan?: Yes  Freedom of Choice List was Provided with Basic Dialogue that Supports the Patient's Individualized Plan of Care/Goals, Treatment Preferences and Shares the Quality Data Associated with the Providers?: Yes  Discharge Location  Discharge Placement: Home    Pt ready to d/c home with family, no further needs at this time, CM signing off

## 2020-01-16 NOTE — PROGRESS NOTES
Am assessment completed. Pt is alert and oriented x 4, lungs clear, breathing non-labored. Bowel sounds + q 4. Verbalizes needs well. Up to bathroom. Lap sites c/d/i some bruising. Denies pain. Continue to monitor.

## 2020-01-16 NOTE — PROGRESS NOTES
TRANSFER - IN REPORT:    Verbal report received from Jose Maria Mcnamara RN(name) on [facility]  being received from [employer]) for routine post - op      Report consisted of patients Situation, Background, Assessment and   Recommendations(SBAR). Information from the following report(s) SBAR, OR Summary and Recent Results was reviewed with the receiving nurse. Opportunity for questions and clarification was provided. Assessment completed upon patients arrival to unit and care assumed. O2 at 2L via NC. Lap sites C/D/I.

## 2020-02-24 ENCOUNTER — HOSPITAL ENCOUNTER (OUTPATIENT)
Dept: LAB | Age: 70
Discharge: HOME | End: 2020-02-24
Payer: MEDICARE

## 2020-02-24 DIAGNOSIS — D75.1 POLYCYTHEMIA: ICD-10-CM

## 2020-02-24 LAB
ALBUMIN SERPL-MCNC: 3.6 G/DL (ref 3.2–4.6)
ALBUMIN/GLOB SERPL: 0.9 {RATIO} (ref 1.2–3.5)
ALP SERPL-CCNC: 89 U/L (ref 50–136)
ALT SERPL-CCNC: 27 U/L (ref 12–65)
ANION GAP SERPL CALC-SCNC: 4 MMOL/L (ref 7–16)
AST SERPL-CCNC: 13 U/L (ref 15–37)
BASOPHILS # BLD: 0 K/UL (ref 0–0.2)
BASOPHILS NFR BLD: 1 % (ref 0–2)
BILIRUB SERPL-MCNC: 0.2 MG/DL (ref 0.2–1.1)
BUN SERPL-MCNC: 14 MG/DL (ref 8–23)
CALCIUM SERPL-MCNC: 9.6 MG/DL (ref 8.3–10.4)
CHLORIDE SERPL-SCNC: 104 MMOL/L (ref 98–107)
CO2 SERPL-SCNC: 31 MMOL/L (ref 21–32)
CREAT SERPL-MCNC: 0.95 MG/DL (ref 0.6–1)
DIFFERENTIAL METHOD BLD: ABNORMAL
EOSINOPHIL # BLD: 0.1 K/UL (ref 0–0.8)
EOSINOPHIL NFR BLD: 1 % (ref 0.5–7.8)
ERYTHROCYTE [DISTWIDTH] IN BLOOD BY AUTOMATED COUNT: 13.2 % (ref 11.9–14.6)
GLOBULIN SER CALC-MCNC: 3.9 G/DL (ref 2.3–3.5)
GLUCOSE SERPL-MCNC: 86 MG/DL (ref 65–100)
HCT VFR BLD AUTO: 42.6 % (ref 35.8–46.3)
HGB BLD-MCNC: 13.5 G/DL (ref 11.7–15.4)
IMM GRANULOCYTES # BLD AUTO: 0 K/UL (ref 0–0.5)
IMM GRANULOCYTES NFR BLD AUTO: 1 % (ref 0–5)
LYMPHOCYTES # BLD: 1.9 K/UL (ref 0.5–4.6)
LYMPHOCYTES NFR BLD: 23 % (ref 13–44)
MCH RBC QN AUTO: 28 PG (ref 26.1–32.9)
MCHC RBC AUTO-ENTMCNC: 31.7 G/DL (ref 31.4–35)
MCV RBC AUTO: 88.2 FL (ref 79.6–97.8)
MONOCYTES # BLD: 0.9 K/UL (ref 0.1–1.3)
MONOCYTES NFR BLD: 11 % (ref 4–12)
NEUTS SEG # BLD: 5.3 K/UL (ref 1.7–8.2)
NEUTS SEG NFR BLD: 64 % (ref 43–78)
NRBC # BLD: 0 K/UL (ref 0–0.2)
PLATELET # BLD AUTO: 318 K/UL (ref 150–450)
PMV BLD AUTO: 9 FL (ref 9.4–12.3)
POTASSIUM SERPL-SCNC: 4 MMOL/L (ref 3.5–5.1)
PROT SERPL-MCNC: 7.5 G/DL (ref 6.3–8.2)
RBC # BLD AUTO: 4.83 M/UL (ref 4.05–5.25)
SODIUM SERPL-SCNC: 139 MMOL/L (ref 136–145)
WBC # BLD AUTO: 8.4 K/UL (ref 4.3–11.1)

## 2020-02-24 PROCEDURE — 80053 COMPREHEN METABOLIC PANEL: CPT

## 2020-02-24 PROCEDURE — 85025 COMPLETE CBC W/AUTO DIFF WBC: CPT

## 2020-02-24 PROCEDURE — 36415 COLL VENOUS BLD VENIPUNCTURE: CPT

## 2020-08-11 ENCOUNTER — HOSPITAL ENCOUNTER (OUTPATIENT)
Dept: CT IMAGING | Age: 70
Discharge: HOME | End: 2020-08-11
Attending: NURSE PRACTITIONER
Payer: MEDICARE

## 2020-08-11 VITALS — BODY MASS INDEX: 34.15 KG/M2 | HEIGHT: 64 IN | WEIGHT: 200 LBS

## 2020-08-11 DIAGNOSIS — F17.210 CIGARETTE NICOTINE DEPENDENCE WITHOUT COMPLICATION: ICD-10-CM

## 2020-08-11 DIAGNOSIS — Z12.9 SCREENING FOR MALIGNANT NEOPLASM: ICD-10-CM

## 2020-08-11 PROCEDURE — G0297 LDCT FOR LUNG CA SCREEN: HCPCS

## 2020-08-12 NOTE — PROGRESS NOTES
Please let patient know that there were no worrisome findings for cancer. Emphysema is unchanged. There are several nodules, all are stable. Will need follow up LDCT in 1 year, but I will arrange at next appt. Thank you.

## 2020-08-13 NOTE — PROGRESS NOTES
Patient was notified that CT revealed no worrisome findings for cancer. Emphysema is unchanged. There are several nodules, all are stable. She was told Phillip Moise recommends a Ct in one year. She verbalized understanding.

## 2021-04-30 PROBLEM — G56.22 CUBITAL TUNNEL SYNDROME ON LEFT: Status: ACTIVE | Noted: 2021-04-30

## 2021-04-30 PROBLEM — M77.02 GOLFER'S ELBOW, LEFT: Status: ACTIVE | Noted: 2021-04-30

## 2022-01-26 ENCOUNTER — TRANSCRIBE ORDER (OUTPATIENT)
Dept: SCHEDULING | Age: 72
End: 2022-01-26

## 2022-01-26 DIAGNOSIS — Z12.31 SCREENING MAMMOGRAM FOR BREAST CANCER: Primary | ICD-10-CM

## 2022-01-26 DIAGNOSIS — Z78.0 POSTMENOPAUSAL: Primary | ICD-10-CM

## 2022-03-18 PROBLEM — K21.9 GERD (GASTROESOPHAGEAL REFLUX DISEASE): Status: ACTIVE | Noted: 2018-06-10

## 2022-03-18 PROBLEM — Z80.3 FAMILY HISTORY OF BREAST CANCER: Status: ACTIVE | Noted: 2018-06-10

## 2022-03-18 PROBLEM — M77.02 GOLFER'S ELBOW, LEFT: Status: ACTIVE | Noted: 2021-04-30

## 2022-03-19 PROBLEM — E66.9 OBESITY: Status: ACTIVE | Noted: 2019-06-30

## 2022-03-19 PROBLEM — R53.82 CHRONIC FATIGUE: Status: ACTIVE | Noted: 2018-06-10

## 2022-03-19 PROBLEM — F32.9 MAJOR DEPRESSIVE DISORDER: Status: ACTIVE | Noted: 2018-06-10

## 2022-03-19 PROBLEM — K81.9 CHOLECYSTITIS: Status: ACTIVE | Noted: 2020-01-14

## 2022-03-19 PROBLEM — E66.09 CLASS 1 OBESITY DUE TO EXCESS CALORIES IN ADULT: Status: ACTIVE | Noted: 2018-06-10

## 2022-03-19 PROBLEM — G56.22 CUBITAL TUNNEL SYNDROME ON LEFT: Status: ACTIVE | Noted: 2021-04-30

## 2022-03-19 PROBLEM — D75.1 POLYCYTHEMIA: Status: ACTIVE | Noted: 2018-06-10

## 2022-03-19 PROBLEM — J44.9 COPD, VERY SEVERE (HCC): Status: ACTIVE | Noted: 2018-06-10

## 2022-03-19 PROBLEM — E66.811 CLASS 1 OBESITY DUE TO EXCESS CALORIES IN ADULT: Status: ACTIVE | Noted: 2018-06-10

## 2022-03-19 PROBLEM — I10 ESSENTIAL HYPERTENSION: Status: ACTIVE | Noted: 2018-06-10

## 2022-03-20 PROBLEM — F17.210 CIGARETTE NICOTINE DEPENDENCE WITHOUT COMPLICATION: Status: ACTIVE | Noted: 2018-06-10

## 2022-04-18 ENCOUNTER — HOSPITAL ENCOUNTER (OUTPATIENT)
Dept: MAMMOGRAPHY | Age: 72
Discharge: HOME | End: 2022-04-18
Attending: NURSE PRACTITIONER
Payer: MEDICARE

## 2022-04-18 DIAGNOSIS — Z12.31 SCREENING MAMMOGRAM FOR BREAST CANCER: ICD-10-CM

## 2022-04-18 DIAGNOSIS — Z78.0 POSTMENOPAUSAL: ICD-10-CM

## 2022-04-18 PROCEDURE — 77080 DXA BONE DENSITY AXIAL: CPT

## 2022-04-18 PROCEDURE — 77067 SCR MAMMO BI INCL CAD: CPT

## 2022-11-30 NOTE — PROGRESS NOTES
Name:  Patricia Saucedo  YOB: 1950   MRN: [id number]      Office Visit 12/1/2022        ASSESSMENT AND PLAN:  (Medical Decision Making)      Rafia was seen today for copd. Diagnoses and all orders for this visit:    Stage 4 very severe COPD by GOLD classification ([facility] [address]  --patient with severe obstruction on spirometry and now requiring O2 with exertion at 3 lpm.  Is on chronic Prednisone 15 mg daily. Will add azithromycin three times a week to help prevent further exacerbations. I don't think going up on her Prednisone at this point will help--needs to focus on smoking cessation. -     azithromycin (ZITHROMAX) 250 MG tablet; Take 1 tablet by mouth three times a week TAKE 2 TABS ON DAY 1, THEN 1 TAB A DAY FOR 4 DAYS  -     DME - DURABLE MEDICAL EQUIPMENT    Chronic respiratory failure with hypoxia (HCC)  --wants POC. Will order this at 3 lpm thru her DME company.  -     DME - DURABLE MEDICAL EQUIPMENT    Cigarette nicotine dependence without complication  --contributing to recurrent exacerbations. Total smoking cessation is advised. Would like to try generic Chantix--will see if now available. If not, can try wellbutrin. --need to order LDCT at next visit. Post-COVID syndrome  --contributing to ongoing CORNEJO. Recommended post-Covid rehab, but patient declines. Other orders  -     varenicline (CHANTIX) 1 MG tablet; Take 1 tablet by mouth 2 times daily    Orders Placed This Encounter   Medications    azithromycin (ZITHROMAX) 250 MG tablet     Sig: Take 1 tablet by mouth three times a week TAKE 2 TABS ON DAY 1, THEN 1 TAB A DAY FOR 4 DAYS     Dispense:  12 tablet     Refill:  11    varenicline (CHANTIX) 1 MG tablet     Sig: Take 1 tablet by mouth 2 times daily     Dispense:  60 tablet     Refill:  3         Procedures    DME - DURABLE MEDICAL EQUIPMENT     Please provide patient with POC with O2 at 3 lpm with exertion  O2 sat room air rest is 97%.   O2 sat room air exertion is 86%.  O2 sat 3 lpm exertion is 94%. Follow-up and Dispositions    Return in about 3 months (around 3/1/2023) for Barb lainez MD, COPD, 40 min, spirometry, Arrive 15 minutes prior to appt time. Collaborating physician is Dr. Yung Lambert. ANGELITO Smith, NP, APRN - CNP    Total time for encounter on day of encounter was 30 minutes. This time includes chart prep, review of tests/procedures, review of other provider's notes, documentation and counseling patient regarding disease process and medications. _________________________________________________________________________    HISTORY OF PRESENT ILLNESS:    Ms. Patricia Saucedo is a 81 y.o. female who is seen at [address] [place] [address] today for  COPD     The patient is a 61-year-old female who is seen for follow-up of very severe COPD, lung nodules, and chronic respiratory failure. Was last seen in our office 4/30/21. She would like to be evaluated for a portable oxygen concentrator. She has >120 pack year history of cigarette smoking and continues to smoke 1/4 ppd. Reports Covid x 2 this year--most recent in July, 2022. This did not required hospitalization, but has had worsening shortness of breath since this time. Prior to [facility], she was on oxygen only at bedtime. Since that time she has been wearing oxygen as needed with exertion. She is unable to maneuver the large oxygen tanks and is requesting a portable concentrator. Is on chronic prednisone for her RA. She is currently at 15 mg a day. She has intermittent wheezing. Her last screening CT scan was done 8/11/2020 which revealed centrilobular emphysematous changes, 4 mm groundglass nodule in the right lower lobe, and a 3 mm left lower lobe nodule. She had other nodules which were stable. REVIEW OF SYSTEMS: 10 point review of systems is negative except as reported in HPI. PHYSICAL EXAM: Body mass index is 38.27 kg/m².   Vitals:    12/01/22 1142   BP: 120/80   Pulse: 86 Resp: 20   Temp: 98 °F (36.7 °C)   TempSrc: Temporal   SpO2: 92%   Weight: 230 lb (104.3 kg)   Height: 5' 5\" (1.651 m)         General:   Alert, cooperative, no distress, appears stated age. Eyes:   Conjunctivae/corneas clear. PERRL        Mouth/Throat:  Lips, mucosa, and tongue normal. Teeth and gums normal.        Lungs:     Breath sounds decreased bilaterally with faint wheezing on forced expiration only. Heart:   Regular rate and rhythm, S1, S2 normal, no murmur, click, rub or gallop. Abdomen:    Soft, non-tender. Extremities:  Extremities normal, atraumatic, no cyanosis or edema. Skin:  Skin color normal. No rashes or lesions     Neurologic:  A&Ox3     DIAGNOSTIC TESTS:                                                                                    LABS:   Lab Results   Component Value Date/Time    WBC 8.4 02/24/2020 01:47 PM    HGB 13.5 02/24/2020 01:47 PM    HCT 42.6 02/24/2020 01:47 PM     02/24/2020 01:47 PM     Imaging: I performed an independent interpretation of the patient's images. CXR:   XR CHEST STANDARD TWO VW 06/30/2019    Narrative  CHEST X-RAY, 2 views. HISTORY:  Cough. TECHNIQUE: PA and lateral views. COMPARISON: None. FINDINGS: The lungs are hyperinflated and clear. The heart size is normal. The  costophrenic angles are sharp. The pulmonary vasculature is unremarkable. Included portion of the upper abdomen is unremarkable. Mild degenerative  changes thoracic spine. Impression  IMPRESSION: Negative for acute abnormality. CT Chest:   CT LUNG SCREENING 08/11/2020    Narrative  CT LUNG SCREENING 8/11/2020    INDICATION: Smoking history    Multiple axial images were obtained through the chest without IV contrast.  Low  dose CT protocol was used.  Radiation dose reduction techniques were used for  this study:  Our CT scanners use one or all of the following: Automated exposure  control, adjustment of the mA and/or kVp according to patient's size, iterative  reconstruction. FINDINGS:  Centrilobular emphysematous changes are present. There is a new 4 mm groundglass attenuation right lower lobe nodule (image 136). There is a new subpleural left lower lobe nodule measuring 3 mm in diameter  (image 90). Stable findings: There is a stable 3 mm right middle lobe nodule (series 2 image 157). A 3 mm left lower lobe nodule with adjacent groundglass opacity (image 170) is  unchanged. A 3 mm nodule along the fissure in the left lower lobe is unchanged (image 115). A 3 mm nodule in the lingula (image 125) is unchanged. There is no significant mediastinal or axillary adenopathy. There are no bony  lesions. Limited imaging of the upper abdomen is unremarkable. Impression  IMPRESSION:      1. Centrilobular emphysema. 2. L2 Benign appearance or behavior: Continue annual screening with noncontrast  chest CT using LDCT protocol in 12 months.  solid nodule(s):  <6mm OR new <4mm    part solid nodule(s):  <6mm total diameter on baseline screening    non solid nodule(s):  <30mm OR >=30mm and unchanged or slowly growing    Nuclear Medicine: No results found for this or any previous visit from the past 3650 days. PFTs:       Exercise oximetry--O2 sat on room air at rest is 97%. O2 sat room air exertion is 86%. O2 sat 3 lpm exertion is 94%.       [facility] Reference Info:                                                                                                                  Past Medical History:   Diagnosis Date    Chronic fatigue     COPD (chronic obstructive pulmonary disease) ([facility] [address]     Cubital tunnel syndrome on left 4/30/2021    Depression     Diverticulitis     Emphysema     Essential hypertension 6/10/2018    GERD (gastroesophageal reflux disease)     GERD (gastroesophageal reflux disease) 6/10/2018    Obesity     Psychiatric disorder     anxiety, depression    Skin cancer     Vitamin D deficiency         Tobacco Use      Smoking status: Some Days        Packs/day: 2.00        Years: 60.00        Pack years: 120        Types: Cigarettes        Start date: 10/1/1969        Last attempt to quit: 10/1/2019        Years since quitting: 3.1      Smokeless tobacco: Never      Tobacco comments: Quit smoking: currently smoking <1/4 ppd    Allergies   Allergen Reactions    Latex Other (See Comments)    Penicillins Hives and Swelling    Aspirin Itching    Codeine Nausea Only    Iodine Hives    Macadamia Nut Oil Swelling    Sulfa Antibiotics Itching     Current Outpatient Medications   Medication Instructions    albuterol (PROVENTIL) (2.5 MG/3ML) 0.083% nebulizer solution Inhalation    albuterol sulfate  (90 Base) MCG/ACT inhaler Ventolin HFA 90 mcg/actuation aerosol inhaler    aspirin 81 MG EC tablet Oral, DAILY    [START ON 12/2/2022] azithromycin (ZITHROMAX) 250 mg, Oral, THREE TIMES WEEKLY, TAKE 2 TABS ON DAY 1, THEN 1 TAB A DAY FOR 4 DAYS    DULoxetine (CYMBALTA) 30 mg, Oral, DAILY    fluticasone-umeclidin-vilant (TRELEGY ELLIPTA) 100-62.5-25 MCG/INH AEPB 1 puff, Inhalation, DAILY    HYDROcodone-acetaminophen (NORCO) 5-325 MG per tablet 1 tablet, Oral, EVERY 6 HOURS PRN    meloxicam (MOBIC) 7.5 MG tablet Oral, DAILY    OXYGEN 3 lpm during sleep.     pantoprazole (PROTONIX) 40 mg, Oral, DAILY    predniSONE (DELTASONE) 15 mg, Oral, DAILY    traZODone (DESYREL) 50 mg, Oral, NIGHTLY    varenicline (CHANTIX) 1 mg, Oral, 2 TIMES DAILY    venlafaxine (EFFEXOR) 75 mg, Oral, 3 TIMES DAILY    vitamin D3 (CHOLECALCIFEROL) 125 MCG (5000 UT) TABS tablet Oral, DAILY

## 2022-12-01 ENCOUNTER — TELEPHONE (OUTPATIENT)
Dept: PULMONOLOGY | Age: 72
End: 2022-12-01

## 2022-12-01 ENCOUNTER — OFFICE VISIT (OUTPATIENT)
Dept: PULMONOLOGY | Age: 72
End: 2022-12-01
Payer: MEDICARE

## 2022-12-01 VITALS
DIASTOLIC BLOOD PRESSURE: 80 MMHG | SYSTOLIC BLOOD PRESSURE: 120 MMHG | OXYGEN SATURATION: 92 % | WEIGHT: 230 LBS | RESPIRATION RATE: 20 BRPM | BODY MASS INDEX: 38.32 KG/M2 | HEART RATE: 86 BPM | TEMPERATURE: 98 F | HEIGHT: 65 IN

## 2022-12-01 DIAGNOSIS — J44.9 STAGE 4 VERY SEVERE COPD BY GOLD CLASSIFICATION (HCC): ICD-10-CM

## 2022-12-01 DIAGNOSIS — J96.11 CHRONIC RESPIRATORY FAILURE WITH HYPOXIA (HCC): ICD-10-CM

## 2022-12-01 DIAGNOSIS — U09.9 POST-COVID SYNDROME: ICD-10-CM

## 2022-12-01 DIAGNOSIS — F17.210 CIGARETTE NICOTINE DEPENDENCE WITHOUT COMPLICATION: ICD-10-CM

## 2022-12-01 DIAGNOSIS — J44.9 STAGE 4 VERY SEVERE COPD BY GOLD CLASSIFICATION (HCC): Primary | ICD-10-CM

## 2022-12-01 PROCEDURE — 3017F COLORECTAL CA SCREEN DOC REV: CPT | Performed by: NURSE PRACTITIONER

## 2022-12-01 PROCEDURE — 4004F PT TOBACCO SCREEN RCVD TLK: CPT | Performed by: NURSE PRACTITIONER

## 2022-12-01 PROCEDURE — G8427 DOCREV CUR MEDS BY ELIG CLIN: HCPCS | Performed by: NURSE PRACTITIONER

## 2022-12-01 PROCEDURE — 1090F PRES/ABSN URINE INCON ASSESS: CPT | Performed by: NURSE PRACTITIONER

## 2022-12-01 PROCEDURE — 3079F DIAST BP 80-89 MM HG: CPT | Performed by: NURSE PRACTITIONER

## 2022-12-01 PROCEDURE — 1123F ACP DISCUSS/DSCN MKR DOCD: CPT | Performed by: NURSE PRACTITIONER

## 2022-12-01 PROCEDURE — G8484 FLU IMMUNIZE NO ADMIN: HCPCS | Performed by: NURSE PRACTITIONER

## 2022-12-01 PROCEDURE — G8417 CALC BMI ABV UP PARAM F/U: HCPCS | Performed by: NURSE PRACTITIONER

## 2022-12-01 PROCEDURE — 99214 OFFICE O/P EST MOD 30 MIN: CPT | Performed by: NURSE PRACTITIONER

## 2022-12-01 PROCEDURE — G8399 PT W/DXA RESULTS DOCUMENT: HCPCS | Performed by: NURSE PRACTITIONER

## 2022-12-01 PROCEDURE — 3023F SPIROM DOC REV: CPT | Performed by: NURSE PRACTITIONER

## 2022-12-01 PROCEDURE — 3074F SYST BP LT 130 MM HG: CPT | Performed by: NURSE PRACTITIONER

## 2022-12-01 RX ORDER — AZITHROMYCIN 250 MG/1
250 TABLET, FILM COATED ORAL
Qty: 12 TABLET | Refills: 11 | Status: SHIPPED | OUTPATIENT
Start: 2022-12-02

## 2022-12-01 RX ORDER — VARENICLINE TARTRATE 1 MG/1
1 TABLET, FILM COATED ORAL 2 TIMES DAILY
Qty: 60 TABLET | Refills: 3 | Status: SHIPPED | OUTPATIENT
Start: 2022-12-01

## 2022-12-01 RX ORDER — PREDNISONE 10 MG/1
15 TABLET ORAL DAILY
COMMUNITY

## 2022-12-01 RX ORDER — AZITHROMYCIN 250 MG/1
250 TABLET, FILM COATED ORAL
Qty: 12 TABLET | Refills: 11 | Status: SHIPPED | OUTPATIENT
Start: 2022-12-02 | End: 2022-12-01 | Stop reason: SDUPTHER

## 2022-12-01 RX ORDER — VENLAFAXINE 75 MG/1
75 TABLET ORAL 3 TIMES DAILY
COMMUNITY

## 2022-12-01 RX ORDER — TRAZODONE HYDROCHLORIDE 50 MG/1
50 TABLET ORAL NIGHTLY
COMMUNITY

## 2022-12-01 NOTE — TELEPHONE ENCOUNTER
Patient is using [address] in [facility].    Phone [phone]    Please call pt with any questions

## 2022-12-01 NOTE — TELEPHONE ENCOUNTER
Soraida called ask that the Z sarkis be resent it .  It has 2 different instructions and has 11 refills on it

## 2022-12-02 ENCOUNTER — TELEPHONE (OUTPATIENT)
Dept: PULMONOLOGY | Age: 72
End: 2022-12-02

## 2022-12-02 NOTE — TELEPHONE ENCOUNTER
[address] is calling to clarify instructions on medication. .. Azithromyacin 250MG. There are 2 different sigs attached.      [phone]

## 2022-12-12 ENCOUNTER — TELEPHONE (OUTPATIENT)
Dept: PULMONOLOGY | Age: 72
End: 2022-12-12

## 2022-12-12 NOTE — TELEPHONE ENCOUNTER
I called and spoke with Christian Betancur and told I the order was there along with notes after she went back to check she found it. Hopefully the will process this order and get this out to the patient. Sterling fuentes

## 2022-12-12 NOTE — TELEPHONE ENCOUNTER
Julio Elaine called stating that pt needs order for POC. They received the notes, but not the order. They need the order with titration, and order. She states the note indicates POC. Please call with questions at [phone].

## 2023-03-13 ENCOUNTER — TELEPHONE (OUTPATIENT)
Dept: PULMONOLOGY | Age: 73
End: 2023-03-13

## 2023-03-13 NOTE — TELEPHONE ENCOUNTER
I called and left message about toda visit to see if patient needs to reschedule . ... sadia fuentes

## 2023-05-02 NOTE — PROGRESS NOTES
follow-up of very severe COPD, lung nodules, and chronic respiratory failure. She is accompanied by her sister, Madison Lao. She has >120 pack year history of cigarette smoking and states she hasn't smoked in 3 weeks. Had Covid x 2 in 2022--most recent in July, 2022. This did not required hospitalization, but has had worsening shortness of breath since this time. Prior to [facility], she was on oxygen only at bedtime. Since that time she has been wearing oxygen as needed with exertion. She is unable to maneuver the large oxygen tanks and is requesting a portable concentrator. Is on chronic prednisone for her RA and PMR. She is currently at 10 mg a day. Was started on 3 times weekly azithro last visit and hasn't had any exacerbations since that time. Her last screening CT scan was done 8/11/2020 which revealed centrilobular emphysematous changes, 4 mm groundglass nodule in the right lower lobe, and a 3 mm left lower lobe nodule. She had other nodules which were stable. Needs updated LDCT    REVIEW OF SYSTEMS: 10 point review of systems is negative except as reported in HPI. PHYSICAL EXAM: Body mass index is 40.1 kg/m². Vitals:    05/03/23 1428   BP: 134/78   Pulse: (!) 104   Resp: 19   Temp: 97 °F (36.1 °C)   TempSrc: Skin   SpO2: 91%   Weight: 233 lb 9.6 oz (106 kg)   Height: 5' 4\" (1.626 m)         General:   Alert, cooperative, no distress, appears stated age. Eyes:   Conjunctivae/corneas clear. PERRL        Mouth/Throat:  Lips, mucosa, and tongue normal. Teeth and gums normal.        Lungs:     Breath sounds decreased bilaterally, but clear. Heart:   Regular rate and rhythm, S1, S2 normal, no murmur, click, rub or gallop. Abdomen:    Soft, non-tender. Extremities:  Extremities normal, atraumatic, no cyanosis or edema.      Skin:  Skin color normal. No rashes or lesions     Neurologic:  A&Ox3     DIAGNOSTIC TESTS:

## 2023-05-03 ENCOUNTER — OFFICE VISIT (OUTPATIENT)
Dept: PULMONOLOGY | Age: 73
End: 2023-05-03
Payer: MEDICARE

## 2023-05-03 VITALS
BODY MASS INDEX: 39.88 KG/M2 | WEIGHT: 233.6 LBS | HEART RATE: 104 BPM | RESPIRATION RATE: 19 BRPM | OXYGEN SATURATION: 91 % | DIASTOLIC BLOOD PRESSURE: 78 MMHG | TEMPERATURE: 97 F | SYSTOLIC BLOOD PRESSURE: 134 MMHG | HEIGHT: 64 IN

## 2023-05-03 DIAGNOSIS — J44.9 STAGE 4 VERY SEVERE COPD BY GOLD CLASSIFICATION (HCC): Primary | ICD-10-CM

## 2023-05-03 DIAGNOSIS — F17.211 CIGARETTE NICOTINE DEPENDENCE IN REMISSION: ICD-10-CM

## 2023-05-03 DIAGNOSIS — R09.02 HYPOXEMIA: ICD-10-CM

## 2023-05-03 LAB
EXPIRATORY TIME: NORMAL
FEF 25-75% %PRED-PRE: NORMAL
FEF 25-75% PRED: NORMAL
FEF 25-75%-PRE: NORMAL
FEV1 %PRED-PRE: 31 %
FEV1 PRED: NORMAL
FEV1/FVC %PRED-PRE: NORMAL
FEV1/FVC PRED: NORMAL
FEV1/FVC: 48 %
FEV1: 0.69 L
FVC %PRED-PRE: 49 %
FVC PRED: NORMAL
FVC: 1.45 L
PEF %PRED-PRE: NORMAL
PEF PRED: NORMAL
PEF-PRE: NORMAL

## 2023-05-03 PROCEDURE — G8427 DOCREV CUR MEDS BY ELIG CLIN: HCPCS | Performed by: NURSE PRACTITIONER

## 2023-05-03 PROCEDURE — 1090F PRES/ABSN URINE INCON ASSESS: CPT | Performed by: NURSE PRACTITIONER

## 2023-05-03 PROCEDURE — 94010 BREATHING CAPACITY TEST: CPT | Performed by: INTERNAL MEDICINE

## 2023-05-03 PROCEDURE — G8417 CALC BMI ABV UP PARAM F/U: HCPCS | Performed by: NURSE PRACTITIONER

## 2023-05-03 PROCEDURE — 3017F COLORECTAL CA SCREEN DOC REV: CPT | Performed by: NURSE PRACTITIONER

## 2023-05-03 PROCEDURE — 99214 OFFICE O/P EST MOD 30 MIN: CPT | Performed by: NURSE PRACTITIONER

## 2023-05-03 PROCEDURE — 1123F ACP DISCUSS/DSCN MKR DOCD: CPT | Performed by: NURSE PRACTITIONER

## 2023-05-03 PROCEDURE — 1036F TOBACCO NON-USER: CPT | Performed by: NURSE PRACTITIONER

## 2023-05-03 PROCEDURE — 3075F SYST BP GE 130 - 139MM HG: CPT | Performed by: NURSE PRACTITIONER

## 2023-05-03 PROCEDURE — G8399 PT W/DXA RESULTS DOCUMENT: HCPCS | Performed by: NURSE PRACTITIONER

## 2023-05-03 PROCEDURE — 3023F SPIROM DOC REV: CPT | Performed by: NURSE PRACTITIONER

## 2023-05-03 PROCEDURE — G0296 VISIT TO DETERM LDCT ELIG: HCPCS | Performed by: NURSE PRACTITIONER

## 2023-05-03 PROCEDURE — 3078F DIAST BP <80 MM HG: CPT | Performed by: NURSE PRACTITIONER

## 2023-05-03 ASSESSMENT — PULMONARY FUNCTION TESTS
FEV1_PERCENT_PREDICTED_PRE: 31
FVC_PERCENT_PREDICTED_PRE: 49
FEV1: 0.69
FVC: 1.45
FEV1/FVC: 48

## 2023-05-03 NOTE — PATIENT INSTRUCTIONS
The company who will be taking care of your portable oxygen concentrator is: They received our order on 12/12/2022--  Address: [address] [place] [address] Maryjo Garcia, [address]  Phone: [phone]  Fax: [phone]        Resources for portable oxygen concentrator----    Can call [employer] at [phone] for information about portable oxygen concentrator. Can also look them up on the internet at www. [employer]. com    Can also look at [employer]. com for reasonable prices on portable O2 concentrator. Learning About Lung Cancer Screening  What is screening for lung cancer? Lung cancer screening is a way to find some lung cancers early, before a person has any symptoms of the cancer. Lung cancer screening may help those who have the highest risk for lung cancer--people age 48 and older who are or were heavy smokers. For most people, who aren't at increased risk, screening for lung cancer probably isn't helpful. Screening won't prevent cancer. And it may not find all lung cancers. Lung cancer screening may lower the risk of dying from lung cancer in a small number of people. How is it done? Lung cancer screening is done with a low-dose CT (computed tomography) scan. A CT scan uses X-rays, or radiation, to make detailed pictures of your body. Experts recommend that screening be done in medical centers that focus on finding and treating lung cancer. Who is screening recommended for? Lung cancer screening is recommended for people age 48 and older who are or were heavy smokers. That means people with a smoking history of at least 20 pack years. A pack year is a way to measure how heavy a smoker you are or were. To figure out your pack years, multiply how many packs a day on average (assuming 20 cigarettes per pack) you have smoked by how many years you have smoked. For example: If you smoked 1 pack a day for 20 years, that's 1 times 20. So you have a smoking history of 20 pack years.   If you smoked 2 packs a

## 2023-05-05 ENCOUNTER — TELEPHONE (OUTPATIENT)
Dept: PULMONOLOGY | Age: 73
End: 2023-05-05

## 2023-07-18 ENCOUNTER — TELEPHONE (OUTPATIENT)
Dept: PULMONOLOGY | Age: 73
End: 2023-07-18

## 2023-07-18 RX ORDER — VARENICLINE TARTRATE 1 MG/1
1 TABLET, FILM COATED ORAL 2 TIMES DAILY
Qty: 60 TABLET | Refills: 3 | Status: SHIPPED | OUTPATIENT
Start: 2023-07-18

## 2023-07-18 NOTE — TELEPHONE ENCOUNTER
Kathrin patient has called requesting chantix if you will send in her a RX for it, she wants it to go to [employer]. Michelle Fears  sadia fuentes

## 2023-08-10 ENCOUNTER — OFFICE VISIT (OUTPATIENT)
Dept: SLEEP MEDICINE | Age: 73
End: 2023-08-10
Payer: MEDICARE

## 2023-08-10 VITALS
BODY MASS INDEX: 39.99 KG/M2 | OXYGEN SATURATION: 92 % | WEIGHT: 240 LBS | HEART RATE: 91 BPM | RESPIRATION RATE: 18 BRPM | SYSTOLIC BLOOD PRESSURE: 124 MMHG | HEIGHT: 65 IN | DIASTOLIC BLOOD PRESSURE: 70 MMHG

## 2023-08-10 DIAGNOSIS — J44.9 COPD, VERY SEVERE (HCC): ICD-10-CM

## 2023-08-10 DIAGNOSIS — G47.10 HYPERSOMNIA: Primary | ICD-10-CM

## 2023-08-10 DIAGNOSIS — R06.83 SNORING: ICD-10-CM

## 2023-08-10 DIAGNOSIS — G47.8 NON-RESTORATIVE SLEEP: ICD-10-CM

## 2023-08-10 PROCEDURE — 1123F ACP DISCUSS/DSCN MKR DOCD: CPT | Performed by: NURSE PRACTITIONER

## 2023-08-10 PROCEDURE — G8417 CALC BMI ABV UP PARAM F/U: HCPCS | Performed by: NURSE PRACTITIONER

## 2023-08-10 PROCEDURE — 3078F DIAST BP <80 MM HG: CPT | Performed by: NURSE PRACTITIONER

## 2023-08-10 PROCEDURE — G8399 PT W/DXA RESULTS DOCUMENT: HCPCS | Performed by: NURSE PRACTITIONER

## 2023-08-10 PROCEDURE — 1090F PRES/ABSN URINE INCON ASSESS: CPT | Performed by: NURSE PRACTITIONER

## 2023-08-10 PROCEDURE — 1036F TOBACCO NON-USER: CPT | Performed by: NURSE PRACTITIONER

## 2023-08-10 PROCEDURE — 3023F SPIROM DOC REV: CPT | Performed by: NURSE PRACTITIONER

## 2023-08-10 PROCEDURE — G8427 DOCREV CUR MEDS BY ELIG CLIN: HCPCS | Performed by: NURSE PRACTITIONER

## 2023-08-10 PROCEDURE — 99203 OFFICE O/P NEW LOW 30 MIN: CPT | Performed by: NURSE PRACTITIONER

## 2023-08-10 PROCEDURE — 3074F SYST BP LT 130 MM HG: CPT | Performed by: NURSE PRACTITIONER

## 2023-08-10 PROCEDURE — 3017F COLORECTAL CA SCREEN DOC REV: CPT | Performed by: NURSE PRACTITIONER

## 2023-08-10 ASSESSMENT — SLEEP AND FATIGUE QUESTIONNAIRES
HOW LIKELY ARE YOU TO NOD OFF OR FALL ASLEEP WHILE WATCHING TV: 3
HOW LIKELY ARE YOU TO NOD OFF OR FALL ASLEEP WHILE SITTING INACTIVE IN A PUBLIC PLACE: 1
HOW LIKELY ARE YOU TO NOD OFF OR FALL ASLEEP WHILE SITTING AND TALKING TO SOMEONE: 0
ESS TOTAL SCORE: 10
HOW LIKELY ARE YOU TO NOD OFF OR FALL ASLEEP WHILE SITTING AND READING: 2
HOW LIKELY ARE YOU TO NOD OFF OR FALL ASLEEP WHILE LYING DOWN TO REST IN THE AFTERNOON WHEN CIRCUMSTANCES PERMIT: 2
HOW LIKELY ARE YOU TO NOD OFF OR FALL ASLEEP WHEN YOU ARE A PASSENGER IN A CAR FOR AN HOUR WITHOUT A BREAK: 1
HOW LIKELY ARE YOU TO NOD OFF OR FALL ASLEEP IN A CAR, WHILE STOPPED FOR A FEW MINUTES IN TRAFFIC: 0
HOW LIKELY ARE YOU TO NOD OFF OR FALL ASLEEP WHILE SITTING QUIETLY AFTER LUNCH WITHOUT ALCOHOL: 1

## 2023-08-10 NOTE — PATIENT INSTRUCTIONS
You will get a call from [facility] to schedule your in-lab PSG. Once the test is completed, a physician will read the study. You will be contacted from our office to discuss results, get started on PAP therapy, or schedule a follow up visit to discuss treatment options.

## 2023-08-29 ENCOUNTER — HOSPITAL ENCOUNTER (OUTPATIENT)
Dept: SLEEP CENTER | Age: 73
Discharge: HOME | End: 2023-09-01
Payer: MEDICARE

## 2023-08-29 PROCEDURE — 95811 POLYSOM 6/>YRS CPAP 4/> PARM: CPT

## 2023-09-21 ENCOUNTER — TELEPHONE (OUTPATIENT)
Dept: SLEEP MEDICINE | Age: 73
End: 2023-09-21

## 2023-09-24 DIAGNOSIS — G47.33 OSA (OBSTRUCTIVE SLEEP APNEA): Primary | ICD-10-CM

## 2023-09-29 ENCOUNTER — TELEPHONE (OUTPATIENT)
Dept: SLEEP MEDICINE | Age: 73
End: 2023-09-29

## 2023-09-29 NOTE — TELEPHONE ENCOUNTER
Patient is asking to have cpap order sent to [address] [place] [address].   That's the company she uses

## 2023-11-06 NOTE — PROGRESS NOTES
Name:  Ghanshyam Genao  YOB: 1950   MRN: [id number]      Office Visit: 11/7/2023        ASSESSMENT AND PLAN:  (Medical Decision Making)    Impression:     1. Stage 4 very severe COPD by GOLD classification ([facility])  --GOLD stage 4--no exacerbations since changing to Trelegy qd and 3 times weekly azithromycin. Continue current therapy    - albuterol (PROVENTIL) (2.5 MG/3ML) 0.083% nebulizer solution; Take 3 mLs by nebulization every 4 hours as needed for Wheezing Dx code j44.9  Dispense: 360 mL; Refill: 11  - albuterol sulfate HFA (PROVENTIL;VENTOLIN;PROAIR) 108 (90 Base) MCG/ACT inhaler; Inhale 2 puffs into the lungs every 4 hours as needed for Wheezing Ventolin HFA 90 mcg/actuation aerosol inhaler dx code j44.9  Dispense: 3 each; Refill: 3  - fluticasone-umeclidin-vilant (TRELEGY ELLIPTA) 100-62.5-25 MCG/ACT AEPB inhaler; Inhale 1 puff into the lungs daily Dx code j44.9  Dispense: 3 each; Refill: 3  - azithromycin (ZITHROMAX) 250 MG tablet; Take 1 tablet by mouth three times a week  Dispense: 36 tablet; Refill: 3    2. Chronic respiratory failure with hypoxia (HCC)  --continue O2 at 4 lpm with exertion and sleep. 3. Cigarette nicotine dependence in remission  --she has been off of cigarettes since 4/2023. She is congratulated on her smoking cessation. Reminded patient that she is past due for her LDCT and encouraged her to schedule this.     Orders Placed This Encounter   Medications    albuterol (PROVENTIL) (2.5 MG/3ML) 0.083% nebulizer solution     Sig: Take 3 mLs by nebulization every 4 hours as needed for Wheezing Dx code j44.9     Dispense:  360 mL     Refill:  11    albuterol sulfate HFA (PROVENTIL;VENTOLIN;PROAIR) 108 (90 Base) MCG/ACT inhaler     Sig: Inhale 2 puffs into the lungs every 4 hours as needed for Wheezing Ventolin HFA 90 mcg/actuation aerosol inhaler dx code j44.9     Dispense:  3 each     Refill:  3    fluticasone-umeclidin-vilant (Haydee Bask[place]) 100-62.5-25 MCG/ACT AEPB

## 2023-11-07 ENCOUNTER — OFFICE VISIT (OUTPATIENT)
Dept: PULMONOLOGY | Age: 73
End: 2023-11-07
Payer: MEDICARE

## 2023-11-07 VITALS
DIASTOLIC BLOOD PRESSURE: 80 MMHG | HEART RATE: 109 BPM | RESPIRATION RATE: 18 BRPM | WEIGHT: 232 LBS | SYSTOLIC BLOOD PRESSURE: 130 MMHG | TEMPERATURE: 98.1 F | OXYGEN SATURATION: 95 % | HEIGHT: 65 IN | BODY MASS INDEX: 38.65 KG/M2

## 2023-11-07 DIAGNOSIS — F17.211 CIGARETTE NICOTINE DEPENDENCE IN REMISSION: ICD-10-CM

## 2023-11-07 DIAGNOSIS — J96.11 CHRONIC RESPIRATORY FAILURE WITH HYPOXIA (HCC): ICD-10-CM

## 2023-11-07 DIAGNOSIS — J44.9 STAGE 4 VERY SEVERE COPD BY GOLD CLASSIFICATION (HCC): Primary | ICD-10-CM

## 2023-11-07 PROCEDURE — G8399 PT W/DXA RESULTS DOCUMENT: HCPCS | Performed by: NURSE PRACTITIONER

## 2023-11-07 PROCEDURE — 1090F PRES/ABSN URINE INCON ASSESS: CPT | Performed by: NURSE PRACTITIONER

## 2023-11-07 PROCEDURE — 3017F COLORECTAL CA SCREEN DOC REV: CPT | Performed by: NURSE PRACTITIONER

## 2023-11-07 PROCEDURE — 3079F DIAST BP 80-89 MM HG: CPT | Performed by: NURSE PRACTITIONER

## 2023-11-07 PROCEDURE — G8484 FLU IMMUNIZE NO ADMIN: HCPCS | Performed by: NURSE PRACTITIONER

## 2023-11-07 PROCEDURE — G8427 DOCREV CUR MEDS BY ELIG CLIN: HCPCS | Performed by: NURSE PRACTITIONER

## 2023-11-07 PROCEDURE — G8417 CALC BMI ABV UP PARAM F/U: HCPCS | Performed by: NURSE PRACTITIONER

## 2023-11-07 PROCEDURE — 99214 OFFICE O/P EST MOD 30 MIN: CPT | Performed by: NURSE PRACTITIONER

## 2023-11-07 PROCEDURE — 1036F TOBACCO NON-USER: CPT | Performed by: NURSE PRACTITIONER

## 2023-11-07 PROCEDURE — 3075F SYST BP GE 130 - 139MM HG: CPT | Performed by: NURSE PRACTITIONER

## 2023-11-07 PROCEDURE — 3023F SPIROM DOC REV: CPT | Performed by: NURSE PRACTITIONER

## 2023-11-07 PROCEDURE — 1123F ACP DISCUSS/DSCN MKR DOCD: CPT | Performed by: NURSE PRACTITIONER

## 2023-11-07 RX ORDER — ALBUTEROL SULFATE 90 UG/1
2 AEROSOL, METERED RESPIRATORY (INHALATION) EVERY 4 HOURS PRN
Qty: 3 EACH | Refills: 3 | Status: SHIPPED | OUTPATIENT
Start: 2023-11-07

## 2023-11-07 RX ORDER — FLUTICASONE FUROATE, UMECLIDINIUM BROMIDE AND VILANTEROL TRIFENATATE 100; 62.5; 25 UG/1; UG/1; UG/1
1 POWDER RESPIRATORY (INHALATION) DAILY
Qty: 3 EACH | Refills: 3 | Status: SHIPPED | OUTPATIENT
Start: 2023-11-07

## 2023-11-07 RX ORDER — ALBUTEROL SULFATE 2.5 MG/3ML
2.5 SOLUTION RESPIRATORY (INHALATION) EVERY 4 HOURS PRN
Qty: 360 ML | Refills: 11 | Status: SHIPPED | OUTPATIENT
Start: 2023-11-07

## 2023-11-07 RX ORDER — AZITHROMYCIN 250 MG/1
250 TABLET, FILM COATED ORAL
Qty: 36 TABLET | Refills: 3 | Status: SHIPPED | OUTPATIENT
Start: 2023-11-08

## 2024-02-29 ENCOUNTER — HOSPITAL ENCOUNTER (OUTPATIENT)
Dept: GENERAL RADIOLOGY | Age: 74
Discharge: HOME | End: 2024-02-29
Payer: MEDICARE

## 2024-02-29 DIAGNOSIS — M25.512 PAIN, JOINT, SHOULDER, LEFT: ICD-10-CM

## 2024-02-29 DIAGNOSIS — M25.562 LEFT KNEE PAIN, UNSPECIFIED CHRONICITY: ICD-10-CM

## 2024-02-29 PROCEDURE — 73030 X-RAY EXAM OF SHOULDER: CPT

## 2024-02-29 PROCEDURE — 73562 X-RAY EXAM OF KNEE 3: CPT

## 2024-03-11 ENCOUNTER — HOSPITAL ENCOUNTER (OUTPATIENT)
Dept: CT IMAGING | Age: 74
Discharge: HOME | End: 2024-03-14
Payer: MEDICARE

## 2024-03-11 DIAGNOSIS — F17.211 CIGARETTE NICOTINE DEPENDENCE IN REMISSION: ICD-10-CM

## 2024-03-11 PROCEDURE — 71271 CT THORAX LUNG CANCER SCR C-: CPT

## 2024-03-12 ENCOUNTER — TELEPHONE (OUTPATIENT)
Dept: PULMONOLOGY | Age: 74
End: 2024-03-12

## 2024-03-12 NOTE — TELEPHONE ENCOUNTER
----- Message from KYARA Walker CNP sent at 3/12/2024 10:04 AM EDT -----  Please let patient know that there is stable scarring and small nodules on her CT scan.  No worrisome findings for lung cancer.  Will need follow up CT in 1 year which I will arrange at next appt.

## 2024-03-12 NOTE — RESULT ENCOUNTER NOTE
Please let patient know that there is stable scarring and small nodules on her CT scan.  No worrisome findings for lung cancer.  Will need follow up CT in 1 year which I will arrange at next appt.

## 2024-05-06 NOTE — PROGRESS NOTES
Name:  Rafia Donnelly  YOB: 1950   MRN: [id number]      Office Visit: 5/7/2024        ASSESSMENT AND PLAN:  (Medical Decision Making)    Impression:     1. Stage 4 very severe COPD by GOLD classification ([facility])  --remains on triple therapy.  No exacerbations since last visit.    - albuterol (PROVENTIL) (2.5 MG/3ML) 0.083% nebulizer solution; Take 3 mLs by nebulization every 4 hours as needed for Wheezing Dx code j44.9  Dispense: 360 mL; Refill: 11  - albuterol sulfate HFA (PROVENTIL;VENTOLIN;PROAIR) 108 (90 Base) MCG/ACT inhaler; Inhale 2 puffs into the lungs every 4 hours as needed for Wheezing Ventolin HFA 90 mcg/actuation aerosol inhaler dx code j44.9  Dispense: 3 each; Refill: 3  - fluticasone-umeclidin-vilant (TRELEGY ELLIPTA) 100-62.5-25 MCG/ACT AEPB inhaler; Inhale 1 puff into the lungs daily Dx code j44.9  Dispense: 3 each; Refill: 3  - Spirometry Without Bronchodilator    2. Chronic respiratory failure with hypoxia ([facility])  --continue O2.  - Spirometry Without Bronchodilator    3. Cigarette nicotine dependence in remission  --almost off cigarettes x 1 year.  - Spirometry Without Bronchodilator    4. Post-COVID syndrome  -spirometry stable to improved  - Spirometry Without Bronchodilator    5. Obesity (BMI 35.0-39.9 without comorbidity)  --improving.  Continue to work at efforts with weight loss.    6.  Personal history of tobacco use  --Discussed with patient current guidelines for screening for lung cancer.  Current recommendations are to obtain yearly screening LDCT yearly from age 50-80, or until smoke free for 15 years.  Patient has >120 pack year history of cigarette smoking and currently smoke free.  Discussed with patient risks and benefits of screening, including over-diagnosis, false positive rate, and total radiation exposure.  Patient currently exhibits no signs or symptoms suggestive of lung cancer.  Discussed with patient importance of compliance with yearly annual lung cancer

## 2024-05-07 ENCOUNTER — OFFICE VISIT (OUTPATIENT)
Dept: PULMONOLOGY | Age: 74
End: 2024-05-07
Payer: MEDICARE

## 2024-05-07 VITALS
DIASTOLIC BLOOD PRESSURE: 72 MMHG | HEIGHT: 65 IN | SYSTOLIC BLOOD PRESSURE: 130 MMHG | BODY MASS INDEX: 37.25 KG/M2 | WEIGHT: 223.6 LBS | HEART RATE: 109 BPM | TEMPERATURE: 97.2 F | OXYGEN SATURATION: 91 % | RESPIRATION RATE: 18 BRPM

## 2024-05-07 DIAGNOSIS — E66.9 OBESITY (BMI 35.0-39.9 WITHOUT COMORBIDITY): ICD-10-CM

## 2024-05-07 DIAGNOSIS — J44.9 STAGE 4 VERY SEVERE COPD BY GOLD CLASSIFICATION (HCC): Primary | ICD-10-CM

## 2024-05-07 DIAGNOSIS — F17.211 CIGARETTE NICOTINE DEPENDENCE IN REMISSION: ICD-10-CM

## 2024-05-07 DIAGNOSIS — J96.11 CHRONIC RESPIRATORY FAILURE WITH HYPOXIA (HCC): ICD-10-CM

## 2024-05-07 DIAGNOSIS — Z87.891 PERSONAL HISTORY OF TOBACCO USE: ICD-10-CM

## 2024-05-07 DIAGNOSIS — U09.9 POST-COVID SYNDROME: ICD-10-CM

## 2024-05-07 LAB
EXPIRATORY TIME: NORMAL
FEF 25-75% %PRED-PRE: NORMAL
FEF 25-75% PRED: NORMAL
FEF 25-75-PRE: NORMAL
FEV1 %PRED-PRE: 37 %
FEV1 PRED: 2.22 L
FEV1/FVC %PRED-PRE: NORMAL
FEV1/FVC PRED: NORMAL
FEV1/FVC: 49 %
FEV1: 0.83 L
FVC %PRED-PRE: 58 %
FVC PRED: 2.88 L
FVC: 1.68 L
PEF %PRED-PRE: NORMAL
PEF PRED: NORMAL
PEF-PRE: NORMAL

## 2024-05-07 PROCEDURE — G8427 DOCREV CUR MEDS BY ELIG CLIN: HCPCS | Performed by: NURSE PRACTITIONER

## 2024-05-07 PROCEDURE — 94010 BREATHING CAPACITY TEST: CPT | Performed by: INTERNAL MEDICINE

## 2024-05-07 PROCEDURE — 3017F COLORECTAL CA SCREEN DOC REV: CPT | Performed by: NURSE PRACTITIONER

## 2024-05-07 PROCEDURE — 1123F ACP DISCUSS/DSCN MKR DOCD: CPT | Performed by: NURSE PRACTITIONER

## 2024-05-07 PROCEDURE — 3078F DIAST BP <80 MM HG: CPT | Performed by: NURSE PRACTITIONER

## 2024-05-07 PROCEDURE — 1090F PRES/ABSN URINE INCON ASSESS: CPT | Performed by: NURSE PRACTITIONER

## 2024-05-07 PROCEDURE — 3023F SPIROM DOC REV: CPT | Performed by: NURSE PRACTITIONER

## 2024-05-07 PROCEDURE — G8417 CALC BMI ABV UP PARAM F/U: HCPCS | Performed by: NURSE PRACTITIONER

## 2024-05-07 PROCEDURE — 3075F SYST BP GE 130 - 139MM HG: CPT | Performed by: NURSE PRACTITIONER

## 2024-05-07 PROCEDURE — G8399 PT W/DXA RESULTS DOCUMENT: HCPCS | Performed by: NURSE PRACTITIONER

## 2024-05-07 PROCEDURE — 99214 OFFICE O/P EST MOD 30 MIN: CPT | Performed by: NURSE PRACTITIONER

## 2024-05-07 PROCEDURE — 1036F TOBACCO NON-USER: CPT | Performed by: NURSE PRACTITIONER

## 2024-05-07 PROCEDURE — G0296 VISIT TO DETERM LDCT ELIG: HCPCS | Performed by: NURSE PRACTITIONER

## 2024-05-07 RX ORDER — FLUTICASONE FUROATE, UMECLIDINIUM BROMIDE AND VILANTEROL TRIFENATATE 100; 62.5; 25 UG/1; UG/1; UG/1
1 POWDER RESPIRATORY (INHALATION) DAILY
Qty: 3 EACH | Refills: 3 | Status: SHIPPED | OUTPATIENT
Start: 2024-05-07

## 2024-05-07 RX ORDER — ALBUTEROL SULFATE 2.5 MG/3ML
2.5 SOLUTION RESPIRATORY (INHALATION) EVERY 4 HOURS PRN
Qty: 360 ML | Refills: 11 | Status: SHIPPED | OUTPATIENT
Start: 2024-05-07

## 2024-05-07 RX ORDER — ALBUTEROL SULFATE 90 UG/1
2 AEROSOL, METERED RESPIRATORY (INHALATION) EVERY 4 HOURS PRN
Qty: 3 EACH | Refills: 3 | Status: SHIPPED | OUTPATIENT
Start: 2024-05-07

## 2024-05-07 ASSESSMENT — PULMONARY FUNCTION TESTS
FVC_PERCENT_PREDICTED_PRE: 58
FEV1_PERCENT_PREDICTED_PRE: 37
FEV1_PREDICTED: 2.22
FVC_PREDICTED: 2.88
FEV1/FVC: 49
FVC: 1.68
FEV1: 0.83

## 2024-05-07 NOTE — PATIENT INSTRUCTIONS
I have ordered screening CT that is due AFTER 3/11/2025.  You should receive a call from scheduling within 2-3 business days.  If you do not hear from them, please call [phone] to schedule your test.            Learning About Lung Cancer Screening  What is screening for lung cancer?     Lung cancer screening is a way to find some lung cancers early, before a person has any symptoms of the cancer.  Lung cancer screening may help those who have the highest risk for lung cancer--people age 50 and older who are or were heavy smokers. For most people, who aren't at increased risk, screening for lung cancer probably isn't helpful.  Screening won't prevent cancer. And it may not find all lung cancers. Lung cancer screening may lower the risk of dying from lung cancer in a small number of people.  How is it done?  Lung cancer screening is done with a low-dose CT (computed tomography) scan. A CT scan uses X-rays, or radiation, to make detailed pictures of your body. Experts recommend that screening be done in medical centers that focus on finding and treating lung cancer.  Who is screening recommended for?  Lung cancer screening is recommended for people age 50 and older who are or were heavy smokers. That means people with a smoking history of at least 20 pack years. A pack year is a way to measure how heavy a smoker you are or were.  To figure out your pack years, multiply how many packs a day on average (assuming 20 cigarettes per pack) you have smoked by how many years you have smoked. For example:  If you smoked 1 pack a day for 20 years, that's 1 times 20. So you have a smoking history of 20 pack years.  If you smoked 2 packs a day for 10 years, that's 2 times 10. So you have a smoking history of 20 pack years.  Experts agree that screening is for people who have a high risk of lung cancer. But experts don't agree on what high risk means. Some say people age 50 or older with at least a 20-pack-year smoking history

## 2024-11-26 DIAGNOSIS — J44.9 STAGE 4 VERY SEVERE COPD BY GOLD CLASSIFICATION (HCC): ICD-10-CM

## 2024-12-04 RX ORDER — AZITHROMYCIN 250 MG/1
TABLET, FILM COATED ORAL
Qty: 36 TABLET | Refills: 3 | Status: SHIPPED | OUTPATIENT
Start: 2024-12-04

## 2024-12-04 NOTE — TELEPHONE ENCOUNTER
Patients pharmacy requesting a refill on patients azithromycin (ZITHROMAX) 250 MG tablets. Last seen by Kathrin Bates NP on 05/07/2024. PRETTY

## 2024-12-13 ENCOUNTER — TRANSCRIBE ORDERS (OUTPATIENT)
Dept: SCHEDULING | Age: 74
End: 2024-12-13

## 2024-12-13 DIAGNOSIS — R10.33 PERIUMBILICAL ABDOMINAL PAIN: Primary | ICD-10-CM

## 2025-01-02 ENCOUNTER — HOSPITAL ENCOUNTER (OUTPATIENT)
Dept: NUCLEAR MEDICINE | Age: 75
Discharge: HOME | End: 2025-01-05

## 2025-01-02 DIAGNOSIS — R10.33 PERIUMBILICAL ABDOMINAL PAIN: ICD-10-CM

## 2025-01-24 ENCOUNTER — TRANSCRIBE ORDERS (OUTPATIENT)
Dept: SCHEDULING | Age: 75
End: 2025-01-24

## 2025-01-24 ENCOUNTER — TRANSCRIBE ORDERS (OUTPATIENT)
Dept: ADMINISTRATIVE | Age: 75
End: 2025-01-24

## 2025-01-24 DIAGNOSIS — R10.33 ABDOMINAL PAIN, PERIUMBILICAL: Primary | ICD-10-CM

## 2025-01-24 DIAGNOSIS — R10.33 PERIUMBILICAL ABDOMINAL PAIN: Primary | ICD-10-CM

## 2025-02-03 ENCOUNTER — HOSPITAL ENCOUNTER (OUTPATIENT)
Dept: NUCLEAR MEDICINE | Age: 75
Discharge: HOME | End: 2025-02-06
Payer: MEDICARE

## 2025-02-03 DIAGNOSIS — R10.33 ABDOMINAL PAIN, PERIUMBILICAL: ICD-10-CM

## 2025-02-03 PROCEDURE — 3430000000 HC RX DIAGNOSTIC RADIOPHARMACEUTICAL: Performed by: FAMILY MEDICINE

## 2025-02-03 PROCEDURE — A9537 TC99M MEBROFENIN: HCPCS | Performed by: FAMILY MEDICINE

## 2025-02-03 PROCEDURE — 78226 HEPATOBILIARY SYSTEM IMAGING: CPT

## 2025-02-03 RX ORDER — KIT FOR THE PREPARATION OF TECHNETIUM TC 99M MEBROFENIN 45 MG/10ML
6.1 INJECTION, POWDER, LYOPHILIZED, FOR SOLUTION INTRAVENOUS
Status: COMPLETED | OUTPATIENT
Start: 2025-02-03 | End: 2025-02-03

## 2025-02-03 RX ADMIN — MEBROFENIN 6.1 MILLICURIE: 45 INJECTION, POWDER, LYOPHILIZED, FOR SOLUTION INTRAVENOUS at 08:50

## 2025-03-17 ENCOUNTER — HOSPITAL ENCOUNTER (OUTPATIENT)
Dept: CT IMAGING | Age: 75
Discharge: HOME | End: 2025-03-19
Payer: MEDICARE

## 2025-03-17 DIAGNOSIS — Z87.891 PERSONAL HISTORY OF TOBACCO USE: ICD-10-CM

## 2025-03-17 PROCEDURE — 71271 CT THORAX LUNG CANCER SCR C-: CPT

## 2025-03-23 ENCOUNTER — RESULTS FOLLOW-UP (OUTPATIENT)
Dept: CT IMAGING | Age: 75
End: 2025-03-23

## 2025-03-23 DIAGNOSIS — R91.1 PULMONARY NODULE: Primary | ICD-10-CM

## 2025-03-23 NOTE — RESULT ENCOUNTER NOTE
Please convert images to ION.  Let patient know that the LLL nodule is slowing increasing in size.  Options would be to obtain PET scan versus obtaining follow up CT of chest in 3-6 months without contrast.  May eventually need biopsy if she wants to pursue this.  We can talk more about it at her appt on 3/31.

## 2025-03-24 NOTE — TELEPHONE ENCOUNTER
----- Message from KYARA Lizarraga CNP sent at 3/23/2025  2:08 PM EDT -----  Please convert images to ION.  Let patient know that the LLL nodule is slowing increasing in size.  Options would be to obtain PET scan versus obtaining follow up CT of chest in 3-6 months without contrast.  May eventually need biopsy if she wants to pursue this.  We can talk more about it at her appt on 3/31.    I have spoken with Fish in CT, he will push recent CT to ION. I have spoken with patient.  She is aware of resultd of recent CT chest and would like for me to schedule PET scan prior to her appointment with Mrs Bates if able. PET has worked her in on 3/26 at 130 arrival. Patient is aware that she will get a call from PET prior.  We have reviewed all instructions.

## 2025-03-26 ENCOUNTER — HOSPITAL ENCOUNTER (OUTPATIENT)
Dept: PET IMAGING | Age: 75
Discharge: HOME | End: 2025-03-29
Payer: MEDICARE

## 2025-03-26 DIAGNOSIS — R91.1 PULMONARY NODULE: ICD-10-CM

## 2025-03-26 LAB
GLUCOSE BLD STRIP.AUTO-MCNC: 80 MG/DL (ref 65–100)
SERVICE CMNT-IMP: NORMAL

## 2025-03-26 PROCEDURE — A9609 HC RX DIAGNOSTIC RADIOPHARMACEUTICAL: HCPCS | Performed by: NURSE PRACTITIONER

## 2025-03-26 PROCEDURE — 78815 PET IMAGE W/CT SKULL-THIGH: CPT

## 2025-03-26 PROCEDURE — 82962 GLUCOSE BLOOD TEST: CPT

## 2025-03-26 PROCEDURE — 3430000000 HC RX DIAGNOSTIC RADIOPHARMACEUTICAL: Performed by: NURSE PRACTITIONER

## 2025-03-26 PROCEDURE — 2500000003 HC RX 250 WO HCPCS: Performed by: NURSE PRACTITIONER

## 2025-03-26 RX ORDER — SODIUM CHLORIDE 0.9 % (FLUSH) 0.9 %
20 SYRINGE (ML) INJECTION AS NEEDED
Status: DISCONTINUED | OUTPATIENT
Start: 2025-03-26 | End: 2025-03-30 | Stop reason: HOSPADM

## 2025-03-26 RX ORDER — FLUDEOXYGLUCOSE F 18 200 MCI/ML
13.55 INJECTION, SOLUTION INTRAVENOUS
Status: COMPLETED | OUTPATIENT
Start: 2025-03-26 | End: 2025-03-26

## 2025-03-26 RX ORDER — DIATRIZOATE MEGLUMINE AND DIATRIZOATE SODIUM 660; 100 MG/ML; MG/ML
10 SOLUTION ORAL; RECTAL
Status: DISCONTINUED | OUTPATIENT
Start: 2025-03-26 | End: 2025-03-26

## 2025-03-26 RX ADMIN — FLUDEOXYGLUCOSE F 18 13.55 MILLICURIE: 200 INJECTION, SOLUTION INTRAVENOUS at 13:35

## 2025-03-26 RX ADMIN — SODIUM CHLORIDE, PRESERVATIVE FREE 20 ML: 5 INJECTION INTRAVENOUS at 13:35

## 2025-03-31 ENCOUNTER — OFFICE VISIT (OUTPATIENT)
Dept: PULMONOLOGY | Age: 75
End: 2025-03-31
Payer: MEDICARE

## 2025-03-31 VITALS
BODY MASS INDEX: 36.3 KG/M2 | HEIGHT: 65 IN | TEMPERATURE: 97.2 F | OXYGEN SATURATION: 98 % | SYSTOLIC BLOOD PRESSURE: 130 MMHG | WEIGHT: 217.9 LBS | HEART RATE: 95 BPM | RESPIRATION RATE: 16 BRPM | DIASTOLIC BLOOD PRESSURE: 80 MMHG

## 2025-03-31 DIAGNOSIS — R91.1 LUNG NODULE: Primary | ICD-10-CM

## 2025-03-31 DIAGNOSIS — Z87.891 PERSONAL HISTORY OF TOBACCO USE: ICD-10-CM

## 2025-03-31 DIAGNOSIS — R06.09 DYSPNEA ON EXERTION: ICD-10-CM

## 2025-03-31 DIAGNOSIS — J96.11 CHRONIC RESPIRATORY FAILURE WITH HYPOXIA: ICD-10-CM

## 2025-03-31 DIAGNOSIS — J44.9 STAGE 4 VERY SEVERE COPD BY GOLD CLASSIFICATION (HCC): ICD-10-CM

## 2025-03-31 PROCEDURE — 1159F MED LIST DOCD IN RCRD: CPT | Performed by: NURSE PRACTITIONER

## 2025-03-31 PROCEDURE — 1123F ACP DISCUSS/DSCN MKR DOCD: CPT | Performed by: NURSE PRACTITIONER

## 2025-03-31 PROCEDURE — 3079F DIAST BP 80-89 MM HG: CPT | Performed by: NURSE PRACTITIONER

## 2025-03-31 PROCEDURE — G8417 CALC BMI ABV UP PARAM F/U: HCPCS | Performed by: NURSE PRACTITIONER

## 2025-03-31 PROCEDURE — 1160F RVW MEDS BY RX/DR IN RCRD: CPT | Performed by: NURSE PRACTITIONER

## 2025-03-31 PROCEDURE — G8399 PT W/DXA RESULTS DOCUMENT: HCPCS | Performed by: NURSE PRACTITIONER

## 2025-03-31 PROCEDURE — 3023F SPIROM DOC REV: CPT | Performed by: NURSE PRACTITIONER

## 2025-03-31 PROCEDURE — G2211 COMPLEX E/M VISIT ADD ON: HCPCS | Performed by: NURSE PRACTITIONER

## 2025-03-31 PROCEDURE — 1125F AMNT PAIN NOTED PAIN PRSNT: CPT | Performed by: NURSE PRACTITIONER

## 2025-03-31 PROCEDURE — 99214 OFFICE O/P EST MOD 30 MIN: CPT | Performed by: NURSE PRACTITIONER

## 2025-03-31 PROCEDURE — 3075F SYST BP GE 130 - 139MM HG: CPT | Performed by: NURSE PRACTITIONER

## 2025-03-31 PROCEDURE — G8427 DOCREV CUR MEDS BY ELIG CLIN: HCPCS | Performed by: NURSE PRACTITIONER

## 2025-03-31 PROCEDURE — 1090F PRES/ABSN URINE INCON ASSESS: CPT | Performed by: NURSE PRACTITIONER

## 2025-03-31 PROCEDURE — 3017F COLORECTAL CA SCREEN DOC REV: CPT | Performed by: NURSE PRACTITIONER

## 2025-03-31 PROCEDURE — 1036F TOBACCO NON-USER: CPT | Performed by: NURSE PRACTITIONER

## 2025-03-31 NOTE — PROGRESS NOTES
Name:  Rafia Donnelly  YOB: 1950   MRN: [id number]      Office Visit: 3/31/2025      The patient (or guardian, if applicable) and other individuals in attendance with the patient were advised that Artificial Intelligence will be utilized during this visit to record, process the conversation to generate a clinical note, and support improvement of the AI technology. The patient (or guardian, if applicable) and other individuals in attendance at the appointment consented to the use of AI, including the recording.         Assessment & Plan (Medical Decision Making)      Impression: 75 y.o. female     Assessment & Plan  1. Severe Chronic Obstructive Pulmonary Disease.  Her COPD is currently under control with the use of Trelegy once daily, azithromycin 250 mg three times a week (Monday, Wednesday, and Friday), and albuterol inhaler as needed. A complete pulmonary function tests will be scheduled within the next week to assess her current lung function and guide future treatment decisions.  - AMB POC PLETHYSMOGRAPHY LUNG VOLUMES W/WO AIRWAY RESIST; Future  - AMB POC SPIROMETRY W/BRONCHODILATOR; Future  - AMB POC SPIROMETRY; Future  - AMB POC DIFFUSING CAPACITY; Future    2. Lung Nodule.  The nodule located in the lower region of her left lung has been gradually increasing in size, now measuring 3.5 cm. It appears to be mildly PET positive. The final interpretation of the PET scan by the radiologist is pending, which will influence the subsequent course of action. If the PET scan is positive, surgical removal may be considered.    3. Chronic Respiratory Failure.  She is currently on supplemental oxygen therapy, which seems to be improving her condition. She is advised to continue using oxygen during sleep and when her oxygen levels fall below 90%.    4. History of Tobacco Use.  She has a long history of tobacco use but has successfully abstained from smoking for the past year. Continued abstinence from

## 2025-03-31 NOTE — H&P (VIEW-ONLY)
Name:  Rafia Donnelly  YOB: 1950   MRN: [id number]      Office Visit: 3/31/2025      The patient (or guardian, if applicable) and other individuals in attendance with the patient were advised that Artificial Intelligence will be utilized during this visit to record, process the conversation to generate a clinical note, and support improvement of the AI technology. The patient (or guardian, if applicable) and other individuals in attendance at the appointment consented to the use of AI, including the recording.         Assessment & Plan (Medical Decision Making)      Impression: 75 y.o. female     Assessment & Plan  1. Severe Chronic Obstructive Pulmonary Disease.  Her COPD is currently under control with the use of Trelegy once daily, azithromycin 250 mg three times a week (Monday, Wednesday, and Friday), and albuterol inhaler as needed. A complete pulmonary function tests will be scheduled within the next week to assess her current lung function and guide future treatment decisions.  - AMB POC PLETHYSMOGRAPHY LUNG VOLUMES W/WO AIRWAY RESIST; Future  - AMB POC SPIROMETRY W/BRONCHODILATOR; Future  - AMB POC SPIROMETRY; Future  - AMB POC DIFFUSING CAPACITY; Future    2. Lung Nodule.  The nodule located in the lower region of her left lung has been gradually increasing in size, now measuring 3.5 cm. It appears to be mildly PET positive. The final interpretation of the PET scan by the radiologist is pending, which will influence the subsequent course of action. If the PET scan is positive, surgical removal may be considered.    3. Chronic Respiratory Failure.  She is currently on supplemental oxygen therapy, which seems to be improving her condition. She is advised to continue using oxygen during sleep and when her oxygen levels fall below 90%.    4. History of Tobacco Use.  She has a long history of tobacco use but has successfully abstained from smoking for the past year. Continued abstinence from  50 mg, NIGHTLY    varenicline (CHANTIX) 1 mg, Oral, 2 TIMES DAILY    venlafaxine (EFFEXOR) 75 mg, 3 TIMES DAILY    vitamin D3 (CHOLECALCIFEROL) 125 MCG (5000 UT) TABS tablet DAILY

## 2025-04-01 ENCOUNTER — RESULTS FOLLOW-UP (OUTPATIENT)
Age: 75
End: 2025-04-01

## 2025-04-01 ENCOUNTER — TELEPHONE (OUTPATIENT)
Dept: PULMONOLOGY | Age: 75
End: 2025-04-01

## 2025-04-01 DIAGNOSIS — R94.8 ABNORMAL POSITRON EMISSION TOMOGRAPHY (PET) SCAN: ICD-10-CM

## 2025-04-01 DIAGNOSIS — R91.1 LUNG NODULE: Primary | ICD-10-CM

## 2025-04-01 NOTE — TELEPHONE ENCOUNTER
Dr Winter has reviewed patient PET scan and feels that patient needs to be added to thoracic conference.  If patient is scheduled for EGD, MD feels LN best accessible to EUS and does not feel that BAL or ION would yield results for lung findings.  Mrs Bates approves notification to patient of PET results and plan.  Order in EPIC for thoracic conference discussion and I have left patient a message to call office. Also need to determine who is performing EGD and fax PE report to that office.

## 2025-04-01 NOTE — RESULT ENCOUNTER NOTE
Reviewed PET with Dr. Winter and place on tumor board for 4/2/25 for me to present.  Let patient know results and that I plan on presenting her case to tumor board and then we will decide best course of action.

## 2025-04-02 NOTE — TELEPHONE ENCOUNTER
Patient discussed at thoracic conference lead by Mrs Bates.  Patient is 76 yo that missed follow up LDCT screening.  This imaging was completed on 3/17/2025 and lead to PET scan  IMPRESSION:  1. Area of pleural-based peribronchial consolidation in the lower lobe of the  left lung demonstrating FDG activity, inflammation/infection versus neoplasm.  The total volume of tissue demonstrating FDG activity may be smaller than 8 mm,  precluding definitive characterization by FDG activity.  2. Hypermetabolic left level 4 cervical lymph node consistent with neoplastic  disease.  3. Other findings as noted.        Electronically signed by Hesham Pagan    Patient is scheduled for EGD/Colonoscopy on 4/7.  Per group, ask GI to BX LN by EUS at time of currently scheduled procedure.  If not EBUS/ION BX if not able, CT guided BX of area of pleural based consolidation.     I have spoken with patient.  She is aware of discussion at thoracic conference and is agreeable to recommendation.  Patient is scheduled for EGD/Colon at [facility] on 4/7 with Dr Wallace.  I have spoken with that office and have left a message for Lynn at Dr Wallace's office to call me back and have faxed report from PET scan and last office note to confirmed fax [phone].  Patient is aware that will call her as soon as contact with GI made.

## 2025-04-03 NOTE — TELEPHONE ENCOUNTER
I have spoken with Sofía at [facility].  She will again relay communication to Dr Wallace or his nurse demetria.

## 2025-04-04 ENCOUNTER — PREP FOR PROCEDURE (OUTPATIENT)
Dept: PULMONOLOGY | Age: 75
End: 2025-04-04

## 2025-04-04 DIAGNOSIS — R91.1 PULMONARY NODULE: ICD-10-CM

## 2025-04-04 DIAGNOSIS — R59.9 ADENOPATHY: ICD-10-CM

## 2025-04-04 RX ORDER — SODIUM CHLORIDE 9 MG/ML
INJECTION, SOLUTION INTRAVENOUS PRN
Status: CANCELLED | OUTPATIENT
Start: 2025-04-04

## 2025-04-04 RX ORDER — SODIUM CHLORIDE 0.9 % (FLUSH) 0.9 %
5-40 SYRINGE (ML) INJECTION PRN
Status: CANCELLED | OUTPATIENT
Start: 2025-04-04

## 2025-04-04 RX ORDER — SODIUM CHLORIDE 0.9 % (FLUSH) 0.9 %
5-40 SYRINGE (ML) INJECTION EVERY 12 HOURS SCHEDULED
Status: CANCELLED | OUTPATIENT
Start: 2025-04-04

## 2025-04-04 NOTE — TELEPHONE ENCOUNTER
I have spoken with [facility], Shama. Dr Wallace is able to BX LN but will have to schedule at separate time.  EUS procedure is not yet scheduled.  I have spoken with Dr Winter to assure that with knowledge, patient needs to be scheduled for Ion/EBUS. MD orders for procedure to be scheduled with him on 4/10.  Patient confirms no blood thinners. I have been able to have recent CT chest pushed to ION.  Patient is aware that she will be called by pre-assessment for additional instructions.  He will call if any additional need. She is aware that currently check in is approximately 1000.  She is aware that she will need a  and will need to be NPO unless otherwise directed by pre-assessment.

## 2025-04-08 NOTE — PROGRESS NOTES
Patient verified name and .  Order for consent  was found in EHR and does match case posting; patient verifies procedure.   Type 1B surgery, phone assessment complete.  Orders were received.  Labs per surgeon: PAT Protocol   Labs per anesthesia protocol: none    Patient answered medical/surgical history questions at their best of ability. All prior to admission medications documented in EPIC.    Patient instructed to continue taking all prescription medications up to the day of surgery but to take only the following medications the day of surgery according to anesthesia guidelines with a small sip of water: Albuterol (Ventolin/ Pro-air) use and bring, Portable oxygen use if needed and bring, Trelegy Ellipta, Venlafaxine (Effexor), Pantoprazole (Protonix)  Also, patient is requested to take 2 Tylenol in the morning and then again before bed on the day before surgery. Regular or extra strength may be used.       Patient informed that all vitamins and supplements should be held 7 days prior to surgery and NSAIDS 5 days prior to surgery. Prescription meds to hold: Varenicline (Chantix)    Patient instructed on the following:    > Arrive at Main  Entrance, time of arrival to be called the day before by 1700  > No food after midnight, patient may drink clear liquids up until 2 hours prior to arrival. No gum, candy, mints.   > Responsible adult must drive patient to the hospital, stay during surgery, and patient will need supervision 24 hours after anesthesia  > Use non moisturizing soap in shower the night before surgery and on the morning of surgery  > All piercings must be removed prior to arrival.    > Leave all valuables (money and jewelry) at home but bring insurance card and ID on DOS.   > You may be required to pay a deductible or co-pay on the day of your procedure. You can pre-pay by calling [phone] if your surgery is at the [facility] or [phone] if your surgery is at the [facility].  > Do not  wear make-up, nail polish, lotions, cologne, perfumes, powders, or oil on skin. Artificial nails are not permitted.

## 2025-04-09 ENCOUNTER — ANESTHESIA EVENT (OUTPATIENT)
Dept: SURGERY | Age: 75
End: 2025-04-09
Payer: MEDICARE

## 2025-04-10 ENCOUNTER — HOSPITAL ENCOUNTER (OUTPATIENT)
Age: 75
Setting detail: OUTPATIENT SURGERY
Discharge: HOME | End: 2025-04-10
Attending: STUDENT IN AN ORGANIZED HEALTH CARE EDUCATION/TRAINING PROGRAM | Admitting: STUDENT IN AN ORGANIZED HEALTH CARE EDUCATION/TRAINING PROGRAM
Payer: MEDICARE

## 2025-04-10 ENCOUNTER — ANESTHESIA (OUTPATIENT)
Dept: SURGERY | Age: 75
End: 2025-04-10
Payer: MEDICARE

## 2025-04-10 ENCOUNTER — APPOINTMENT (OUTPATIENT)
Dept: GENERAL RADIOLOGY | Age: 75
End: 2025-04-10
Attending: STUDENT IN AN ORGANIZED HEALTH CARE EDUCATION/TRAINING PROGRAM
Payer: MEDICARE

## 2025-04-10 VITALS
OXYGEN SATURATION: 86 % | DIASTOLIC BLOOD PRESSURE: 61 MMHG | BODY MASS INDEX: 36.32 KG/M2 | SYSTOLIC BLOOD PRESSURE: 115 MMHG | WEIGHT: 218 LBS | RESPIRATION RATE: 16 BRPM | HEART RATE: 83 BPM | TEMPERATURE: 97.6 F | HEIGHT: 65 IN

## 2025-04-10 PROCEDURE — 6360000002 HC RX W HCPCS: Performed by: REGISTERED NURSE

## 2025-04-10 PROCEDURE — 31627 NAVIGATIONAL BRONCHOSCOPY: CPT | Performed by: STUDENT IN AN ORGANIZED HEALTH CARE EDUCATION/TRAINING PROGRAM

## 2025-04-10 PROCEDURE — 7100000000 HC PACU RECOVERY - FIRST 15 MIN: Performed by: STUDENT IN AN ORGANIZED HEALTH CARE EDUCATION/TRAINING PROGRAM

## 2025-04-10 PROCEDURE — 88341 IMHCHEM/IMCYTCHM EA ADD ANTB: CPT

## 2025-04-10 PROCEDURE — 6370000000 HC RX 637 (ALT 250 FOR IP): Performed by: SURGERY

## 2025-04-10 PROCEDURE — 3609020000 HC BRONCHOSCOPY W/EBUS FNA 3/> NODE: Performed by: STUDENT IN AN ORGANIZED HEALTH CARE EDUCATION/TRAINING PROGRAM

## 2025-04-10 PROCEDURE — 7100000001 HC PACU RECOVERY - ADDTL 15 MIN: Performed by: STUDENT IN AN ORGANIZED HEALTH CARE EDUCATION/TRAINING PROGRAM

## 2025-04-10 PROCEDURE — 88305 TISSUE EXAM BY PATHOLOGIST: CPT

## 2025-04-10 PROCEDURE — 88177 CYTP FNA EVAL EA ADDL: CPT

## 2025-04-10 PROCEDURE — 2500000003 HC RX 250 WO HCPCS: Performed by: REGISTERED NURSE

## 2025-04-10 PROCEDURE — 31654 BRONCH EBUS IVNTJ PERPH LES: CPT | Performed by: STUDENT IN AN ORGANIZED HEALTH CARE EDUCATION/TRAINING PROGRAM

## 2025-04-10 PROCEDURE — 7100000010 HC PHASE II RECOVERY - FIRST 15 MIN: Performed by: STUDENT IN AN ORGANIZED HEALTH CARE EDUCATION/TRAINING PROGRAM

## 2025-04-10 PROCEDURE — 7100000011 HC PHASE II RECOVERY - ADDTL 15 MIN: Performed by: STUDENT IN AN ORGANIZED HEALTH CARE EDUCATION/TRAINING PROGRAM

## 2025-04-10 PROCEDURE — 3600000004 HC SURGERY LEVEL 4 BASE: Performed by: STUDENT IN AN ORGANIZED HEALTH CARE EDUCATION/TRAINING PROGRAM

## 2025-04-10 PROCEDURE — 31629 BRONCHOSCOPY/NEEDLE BX EACH: CPT | Performed by: STUDENT IN AN ORGANIZED HEALTH CARE EDUCATION/TRAINING PROGRAM

## 2025-04-10 PROCEDURE — 2720000010 HC SURG SUPPLY STERILE: Performed by: STUDENT IN AN ORGANIZED HEALTH CARE EDUCATION/TRAINING PROGRAM

## 2025-04-10 PROCEDURE — 88172 CYTP DX EVAL FNA 1ST EA SITE: CPT

## 2025-04-10 PROCEDURE — 2709999900 HC NON-CHARGEABLE SUPPLY: Performed by: STUDENT IN AN ORGANIZED HEALTH CARE EDUCATION/TRAINING PROGRAM

## 2025-04-10 PROCEDURE — 31652 BRONCH EBUS SAMPLNG 1/2 NODE: CPT | Performed by: STUDENT IN AN ORGANIZED HEALTH CARE EDUCATION/TRAINING PROGRAM

## 2025-04-10 PROCEDURE — 3700000001 HC ADD 15 MINUTES (ANESTHESIA): Performed by: STUDENT IN AN ORGANIZED HEALTH CARE EDUCATION/TRAINING PROGRAM

## 2025-04-10 PROCEDURE — 2580000003 HC RX 258: Performed by: SURGERY

## 2025-04-10 PROCEDURE — 31638 BRONCHOSCOPY REVISE STENT: CPT | Performed by: STUDENT IN AN ORGANIZED HEALTH CARE EDUCATION/TRAINING PROGRAM

## 2025-04-10 PROCEDURE — 88173 CYTOPATH EVAL FNA REPORT: CPT

## 2025-04-10 PROCEDURE — 88342 IMHCHEM/IMCYTCHM 1ST ANTB: CPT

## 2025-04-10 PROCEDURE — 3600000014 HC SURGERY LEVEL 4 ADDTL 15MIN: Performed by: STUDENT IN AN ORGANIZED HEALTH CARE EDUCATION/TRAINING PROGRAM

## 2025-04-10 PROCEDURE — 3700000000 HC ANESTHESIA ATTENDED CARE: Performed by: STUDENT IN AN ORGANIZED HEALTH CARE EDUCATION/TRAINING PROGRAM

## 2025-04-10 RX ORDER — SODIUM CHLORIDE 0.9 % (FLUSH) 0.9 %
5-40 SYRINGE (ML) INJECTION PRN
Status: DISCONTINUED | OUTPATIENT
Start: 2025-04-10 | End: 2025-04-10 | Stop reason: HOSPADM

## 2025-04-10 RX ORDER — PROPOFOL 10 MG/ML
INJECTION, EMULSION INTRAVENOUS
Status: DISCONTINUED | OUTPATIENT
Start: 2025-04-10 | End: 2025-04-10 | Stop reason: SDUPTHER

## 2025-04-10 RX ORDER — EPHEDRINE SULFATE 5 MG/ML
INJECTION INTRAVENOUS
Status: DISCONTINUED | OUTPATIENT
Start: 2025-04-10 | End: 2025-04-10 | Stop reason: SDUPTHER

## 2025-04-10 RX ORDER — DEXAMETHASONE SODIUM PHOSPHATE 4 MG/ML
INJECTION, SOLUTION INTRA-ARTICULAR; INTRALESIONAL; INTRAMUSCULAR; INTRAVENOUS; SOFT TISSUE
Status: DISCONTINUED | OUTPATIENT
Start: 2025-04-10 | End: 2025-04-10 | Stop reason: SDUPTHER

## 2025-04-10 RX ORDER — SODIUM CHLORIDE 9 MG/ML
INJECTION, SOLUTION INTRAVENOUS PRN
Status: DISCONTINUED | OUTPATIENT
Start: 2025-04-10 | End: 2025-04-10 | Stop reason: HOSPADM

## 2025-04-10 RX ORDER — ROCURONIUM BROMIDE 10 MG/ML
INJECTION, SOLUTION INTRAVENOUS
Status: DISCONTINUED | OUTPATIENT
Start: 2025-04-10 | End: 2025-04-10 | Stop reason: SDUPTHER

## 2025-04-10 RX ORDER — SODIUM CHLORIDE 0.9 % (FLUSH) 0.9 %
5-40 SYRINGE (ML) INJECTION EVERY 12 HOURS SCHEDULED
Status: DISCONTINUED | OUTPATIENT
Start: 2025-04-10 | End: 2025-04-10 | Stop reason: HOSPADM

## 2025-04-10 RX ORDER — LABETALOL HYDROCHLORIDE 5 MG/ML
10 INJECTION, SOLUTION INTRAVENOUS
Status: DISCONTINUED | OUTPATIENT
Start: 2025-04-10 | End: 2025-04-10 | Stop reason: HOSPADM

## 2025-04-10 RX ORDER — OXYCODONE HYDROCHLORIDE 5 MG/1
5 TABLET ORAL
Status: COMPLETED | OUTPATIENT
Start: 2025-04-10 | End: 2025-04-10

## 2025-04-10 RX ORDER — HALOPERIDOL 5 MG/ML
1 INJECTION INTRAMUSCULAR
Status: DISCONTINUED | OUTPATIENT
Start: 2025-04-10 | End: 2025-04-10 | Stop reason: HOSPADM

## 2025-04-10 RX ORDER — ONDANSETRON 2 MG/ML
INJECTION INTRAMUSCULAR; INTRAVENOUS
Status: DISCONTINUED | OUTPATIENT
Start: 2025-04-10 | End: 2025-04-10 | Stop reason: SDUPTHER

## 2025-04-10 RX ORDER — LIDOCAINE HYDROCHLORIDE 20 MG/ML
INJECTION, SOLUTION EPIDURAL; INFILTRATION; INTRACAUDAL; PERINEURAL
Status: DISCONTINUED | OUTPATIENT
Start: 2025-04-10 | End: 2025-04-10 | Stop reason: SDUPTHER

## 2025-04-10 RX ORDER — ESMOLOL HYDROCHLORIDE 10 MG/ML
INJECTION INTRAVENOUS
Status: DISCONTINUED | OUTPATIENT
Start: 2025-04-10 | End: 2025-04-10 | Stop reason: SDUPTHER

## 2025-04-10 RX ORDER — NALOXONE HYDROCHLORIDE 0.4 MG/ML
INJECTION, SOLUTION INTRAMUSCULAR; INTRAVENOUS; SUBCUTANEOUS PRN
Status: DISCONTINUED | OUTPATIENT
Start: 2025-04-10 | End: 2025-04-10 | Stop reason: HOSPADM

## 2025-04-10 RX ORDER — PROCHLORPERAZINE EDISYLATE 5 MG/ML
5 INJECTION INTRAMUSCULAR; INTRAVENOUS
Status: DISCONTINUED | OUTPATIENT
Start: 2025-04-10 | End: 2025-04-10 | Stop reason: HOSPADM

## 2025-04-10 RX ORDER — LIDOCAINE HYDROCHLORIDE 10 MG/ML
1 INJECTION, SOLUTION INFILTRATION; PERINEURAL
Status: DISCONTINUED | OUTPATIENT
Start: 2025-04-10 | End: 2025-04-10 | Stop reason: HOSPADM

## 2025-04-10 RX ORDER — SODIUM CHLORIDE, SODIUM LACTATE, POTASSIUM CHLORIDE, CALCIUM CHLORIDE 600; 310; 30; 20 MG/100ML; MG/100ML; MG/100ML; MG/100ML
INJECTION, SOLUTION INTRAVENOUS CONTINUOUS
Status: DISCONTINUED | OUTPATIENT
Start: 2025-04-10 | End: 2025-04-10 | Stop reason: HOSPADM

## 2025-04-10 RX ORDER — IPRATROPIUM BROMIDE AND ALBUTEROL SULFATE 2.5; .5 MG/3ML; MG/3ML
1 SOLUTION RESPIRATORY (INHALATION)
Status: DISCONTINUED | OUTPATIENT
Start: 2025-04-10 | End: 2025-04-10 | Stop reason: HOSPADM

## 2025-04-10 RX ADMIN — LIDOCAINE HYDROCHLORIDE 60 MG: 20 INJECTION, SOLUTION EPIDURAL; INFILTRATION; INTRACAUDAL; PERINEURAL at 10:21

## 2025-04-10 RX ADMIN — OXYCODONE HYDROCHLORIDE 5 MG: 5 TABLET ORAL at 11:59

## 2025-04-10 RX ADMIN — ESMOLOL HYDROCHLORIDE 20 MG: 10 INJECTION, SOLUTION INTRAVENOUS at 09:18

## 2025-04-10 RX ADMIN — PHENYLEPHRINE HYDROCHLORIDE 150 MCG: 0.1 INJECTION, SOLUTION INTRAVENOUS at 09:40

## 2025-04-10 RX ADMIN — EPHEDRINE SULFATE 5 MG: 5 INJECTION INTRAVENOUS at 09:40

## 2025-04-10 RX ADMIN — PROPOFOL 30 MG: 10 INJECTION, EMULSION INTRAVENOUS at 10:01

## 2025-04-10 RX ADMIN — PHENYLEPHRINE HYDROCHLORIDE 100 MCG: 0.1 INJECTION, SOLUTION INTRAVENOUS at 09:23

## 2025-04-10 RX ADMIN — PROPOFOL 50 MG: 10 INJECTION, EMULSION INTRAVENOUS at 09:12

## 2025-04-10 RX ADMIN — SUGAMMADEX 300 MG: 100 INJECTION, SOLUTION INTRAVENOUS at 10:10

## 2025-04-10 RX ADMIN — PROPOFOL 150 MG: 10 INJECTION, EMULSION INTRAVENOUS at 09:10

## 2025-04-10 RX ADMIN — PROPOFOL 20 MG: 10 INJECTION, EMULSION INTRAVENOUS at 10:23

## 2025-04-10 RX ADMIN — PHENYLEPHRINE HYDROCHLORIDE 50 MCG: 0.1 INJECTION, SOLUTION INTRAVENOUS at 09:35

## 2025-04-10 RX ADMIN — ROCURONIUM BROMIDE 20 MG: 10 INJECTION, SOLUTION INTRAVENOUS at 09:59

## 2025-04-10 RX ADMIN — SODIUM CHLORIDE, SODIUM LACTATE, POTASSIUM CHLORIDE, AND CALCIUM CHLORIDE: 600; 310; 30; 20 INJECTION, SOLUTION INTRAVENOUS at 08:56

## 2025-04-10 RX ADMIN — PHENYLEPHRINE HYDROCHLORIDE 100 MCG: 0.1 INJECTION, SOLUTION INTRAVENOUS at 09:30

## 2025-04-10 RX ADMIN — DEXAMETHASONE SODIUM PHOSPHATE 4 MG: 4 INJECTION INTRA-ARTICULAR; INTRALESIONAL; INTRAMUSCULAR; INTRAVENOUS; SOFT TISSUE at 09:22

## 2025-04-10 RX ADMIN — ROCURONIUM BROMIDE 50 MG: 10 INJECTION, SOLUTION INTRAVENOUS at 09:10

## 2025-04-10 RX ADMIN — PROPOFOL 50 MG: 10 INJECTION, EMULSION INTRAVENOUS at 09:53

## 2025-04-10 RX ADMIN — EPHEDRINE SULFATE 5 MG: 5 INJECTION INTRAVENOUS at 09:12

## 2025-04-10 RX ADMIN — ONDANSETRON 4 MG: 2 INJECTION, SOLUTION INTRAMUSCULAR; INTRAVENOUS at 09:22

## 2025-04-10 RX ADMIN — LIDOCAINE HYDROCHLORIDE 100 MG: 20 INJECTION, SOLUTION EPIDURAL; INFILTRATION; INTRACAUDAL; PERINEURAL at 09:10

## 2025-04-10 ASSESSMENT — PAIN - FUNCTIONAL ASSESSMENT
PAIN_FUNCTIONAL_ASSESSMENT: NONE - DENIES PAIN
PAIN_FUNCTIONAL_ASSESSMENT: 0-10
PAIN_FUNCTIONAL_ASSESSMENT: 0-10

## 2025-04-10 ASSESSMENT — PAIN DESCRIPTION - DESCRIPTORS: DESCRIPTORS: ACHING

## 2025-04-10 ASSESSMENT — PAIN DESCRIPTION - LOCATION: LOCATION: BACK

## 2025-04-10 ASSESSMENT — PAIN SCALES - GENERAL: PAINLEVEL_OUTOF10: 5

## 2025-04-10 NOTE — ANESTHESIA POSTPROCEDURE EVALUATION
Department of Anesthesiology  Postprocedure Note    Patient: Rafia Donnelly  MRN: [id number]  YOB: 1950  Date of evaluation: 4/10/2025    Procedure Summary       Date: 04/10/25 Room / Location: [facility] MAIN OR  / [facility] MAIN OR    Anesthesia Start: 0856 Anesthesia Stop: 1049    Procedure: BRONCHOSCOPY ENDOBRONCHIAL ULTRASOUND FINE NEEDLE ASPIRATION ROBOTIC ION in fluoro copy to [facility] (Chest) Diagnosis:       Adenopathy      Pulmonary nodule      (Adenopathy [R59.9])      (Pulmonary nodule [R91.1])    Providers: Farhan Winter MD Responsible Provider: Moses Waldrop MD    Anesthesia Type: General ASA Status: 4            Anesthesia Type: General    Wilfrido Phase I: Wilfrido Score: 8    Wilfrido Phase II: Wilfrido Score: 8    Anesthesia Post Evaluation    Patient location during evaluation: PACU  Patient participation: complete - patient participated  Level of consciousness: awake and alert  Airway patency: patent  Nausea & Vomiting: no nausea and no vomiting  Cardiovascular status: hemodynamically stable  Respiratory status: acceptable, nonlabored ventilation and spontaneous ventilation  Hydration status: euvolemic  Comments: /61   Pulse 83   Temp 97.6 °F (36.4 °C) (Infrared)   Resp 16   Ht 1.651 m (5' 5\")   Wt 98.9 kg (218 lb)   SpO2 (!) 86%   BMI 36.28 kg/m²     Multimodal analgesia pain management approach  Pain management: adequate and satisfactory to patient    No notable events documented.

## 2025-04-10 NOTE — ANESTHESIA PRE PROCEDURE
Department of Anesthesiology  Preprocedure Note       Name:  Rafia Donnelly   Age:  75 y.o.  :  1950                                          MRN:  [id number]         Date:  4/10/2025      Surgeon: Surgeon(s):  Farhan Winter MD    Procedure: Procedure(s):  BRONCHOSCOPY ENDOBRONCHIAL ULTRASOUND FINE NEEDLE ASPIRATION ROBOTIC ION in fluoro copy to [facility]    Medications prior to admission:   Prior to Admission medications    Medication Sig Start Date End Date Taking? Authorizing Provider   fluticasone-umeclidin-vilant (TRELEGY ELLIPTA) 100-62.5-25 MCG/ACT AEPB inhaler Inhale 1 puff into the lungs daily Dx code j44.9 24  Yes Kathrin Bates APRN - CNP   varenicline (CHANTIX) 1 MG tablet Take 1 tablet by mouth 2 times daily 23  Yes Kathrin Bates APRN - CNP   venlafaxine (EFFEXOR) 75 MG tablet Take 1 tablet by mouth daily   Yes Provider, MD Haydee   OXYGEN 4 lpm during sleep and exertion.   Yes Automatic Reconciliation, Ar   aspirin 81 MG EC tablet Take by mouth daily   Yes Automatic Reconciliation, Ar   vitamin D3 (CHOLECALCIFEROL) 125 MCG (5000 UT) TABS tablet Take by mouth daily   Yes Automatic Reconciliation, Ar   azithromycin (ZITHROMAX) 250 MG tablet TAKE ONE TABLET BY MOUTH 3 TIMES A WEEK 24   Kathrin Bates APRN - CNP   albuterol (PROVENTIL) (2.5 MG/3ML) 0.083% nebulizer solution Take 3 mLs by nebulization every 4 hours as needed for Wheezing Dx code j44.9 24   Kathrin Bates APRN - CNP   albuterol sulfate HFA (PROVENTIL;VENTOLIN;PROAIR) 108 (90 Base) MCG/ACT inhaler Inhale 2 puffs into the lungs every 4 hours as needed for Wheezing Ventolin HFA 90 mcg/actuation aerosol inhaler dx code j44.9 24   Kathrin Bates APRN - CNP   Etanercept 50 MG/ML SOAJ Inject 50 mg into the skin once a week 23   Provider, MD Haydee   traZODone (DESYREL) 50 MG tablet Take 1 tablet by mouth nightly  Patient not taking: Reported on 8/10/2023    Provider,

## 2025-04-10 NOTE — INTERVAL H&P NOTE
Update History & Physical    The patient's History and Physical of March 31, 2025 was reviewed with the patient and I examined the patient. There was no change. The surgical site was confirmed by the patient and me.     75 y.o. 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 No meds that need to be held.      Diagnostic Review:  PET CT 3/26/25:  LLL Pet avid lesion    Station 2L LN PET Avid:      Plan: The risks, benefits, expected outcome, and alternative to the recommended procedure have been discussed with the patient. Patient understands and wants to proceed with the procedure.     Electronically signed by Farhan Winter MD on 4/10/2025 at 8:47 AM

## 2025-04-10 NOTE — DISCHARGE INSTRUCTIONS
RESPIRATORY CARE - BRONCHOSCOPY - DISCHARGE INSTRUCTIONS    You received a lot of numbing medication for your throat and nose, and you also received medication to make you sleepy during your procedure.  Because of this and because the bronchoscopy may have irritated your airways, we ask that you follow these directions:    1.         Do not eat or drink anything until 12:00 pm.   After that, you may have what you please.               You may want to try some liquids first, because your throat may be a little sore.    2. Medication may cause drowsiness for several hours, therefore, do not drive or operate machinery for remainder of the day.  No alcohol today.    3. You may cough up more mucus than usual and you may see some blood, but this is expected and should subside by the following day.    4. If severe throat irritation, coughing, or bleeding continue, call your doctor.    5.         If you run a fever greater than 102, call [facility] at [phone].    6.         Please follow-up with GI. Referral has been sent.    MEDICATION INTERACTION:  During your procedure you potentially received a medication or medications which may reduce the effectiveness of oral contraceptives. Please consider other forms of contraception for 1 month following your procedure if you are currently using oral contraceptives as your primary form of birth control. In addition to this, we recommend continuing your oral contraceptive as prescribed, unless otherwise instructed by your physician, during this time    After general anesthesia or intravenous sedation, for 24 hours or while taking prescription Narcotics:  Limit your activities  A responsible adult needs to be with you for the next 24 hours  Do not drive and operate hazardous machinery  Do not make important personal or business decisions  Do not drink alcoholic beverages  If you have not urinated within 8 hours after discharge, and you are experiencing discomfort from

## 2025-04-10 NOTE — OP NOTE
04/10/25  PROCEDURE  Robotic Bronchoscopy of LLL semisolid/GGO w/ staging EBUS    EQUIPMENT  Olympus  Bronchoscope  ION Robotic Bronchoscope  Olympus LT138G EBUS scope  UM 17/20 Radial probe  ION 21g needle, forceps    INDICATION   Peripheral lung semisolid groundglass nodule suspicious for malignancy /staging of presumed malignancy    IMAGING  See H&P    ANESTHESIA  Intubation and general sedation performed by anesthesia. See MAR.     Procedure:  AIRWAY INSPECTION  After obtaining informed consent, an Olympus Bronchoscope was introduced into the trachea through the ETT without complication. This was used to clean the airways, perform visual inspection, and evaluate for any endobronchial lesions.   Airway exam significant for thin secretions throughout airways, no endobronchial lesions    Navigation  CT images acquired with thin slice protocol were used to plan the pathway to target lesion planned using Ion robotic software.  ION Robotic Bronchoscope system was introduced into the airway to identify and biopsy the lesion seen on prior imaging, navigating to the lesion with the aid of fluoroscopy and radial probe US. Visibility with radial US was: eccentric  TBNA and forceps biopsies of the lesion were taken. LILLY was present.   Histology from obtained biopsies is pending.    After identifying targets the following samples were obtained:  Location Slide # Sampling technique Findings on LILLY   LLL groundglass nodule 1-6 TBNA Atypical cells, blood   \" \" 1-6 Forceps touch prep Suspicious cells, atypical cells     A total of 6 TBNA passes and 6 transbronchial forceps biopsies were performed    EBUS  After completing the airway inspection an Olympus  F EBUS bronchoscope was introduced into the trachea through the ETT without complication. Due to a latex allergy a balloon was not used, significantly limiting full evaluation of several monique stations. The high 2L lesion seen on PET was not

## 2025-04-10 NOTE — PERIOP NOTE
11:42 AM  Dr. Waldrop at bedside for sign out. Per MD, pt 85% on room air on arrival and may discharge at this level too.

## 2025-04-14 ENCOUNTER — CLINICAL SUPPORT (OUTPATIENT)
Dept: PULMONOLOGY | Age: 75
End: 2025-04-14

## 2025-04-14 DIAGNOSIS — R91.1 PULMONARY NODULE: Primary | ICD-10-CM

## 2025-04-14 NOTE — PROGRESS NOTES
Patient came in for CPFTs but had just had a bronchoscopy 4 days prior and patient was still coughing from the procedure. We rescheduled to 05/05/25. PRETTY

## 2025-04-17 ENCOUNTER — TELEPHONE (OUTPATIENT)
Dept: PULMONOLOGY | Age: 75
End: 2025-04-17

## 2025-04-17 DIAGNOSIS — R59.9 ADENOPATHY: Primary | ICD-10-CM

## 2025-04-17 NOTE — TELEPHONE ENCOUNTER
Farhan Winter MD Kisler, Debra L, P  I'm not 100% sure we were able to bx the PET avid 2L, I biopsied something in the region but can we refer to GI here so that can do EUS to be sure to bx it?    I have spoken with Sherry in cytology as pathology from 2L LN is not resulted.  Tissue was sent to Dr Bhandari yesterday and should result today.     Referral faxed to [facility] at [phone]

## 2025-04-22 ENCOUNTER — RESULTS FOLLOW-UP (OUTPATIENT)
Dept: PULMONOLOGY | Age: 75
End: 2025-04-22

## 2025-04-23 ENCOUNTER — TELEPHONE (OUTPATIENT)
Dept: PULMONOLOGY | Age: 75
End: 2025-04-23

## 2025-04-23 DIAGNOSIS — R91.1 PULMONARY NODULE: ICD-10-CM

## 2025-04-23 DIAGNOSIS — R59.9 ADENOPATHY: Primary | ICD-10-CM

## 2025-04-23 DIAGNOSIS — R91.1 LUNG NODULE: ICD-10-CM

## 2025-04-23 NOTE — TELEPHONE ENCOUNTER
Farhan Winter MD Kisler, Debra L, RCP  Her LLL groundglass shows pulmonary adenocarcinoma - the LN/spot I biopsied was negative for malignancy but I'm not sure it was the right spot. Looks like she has pfts - she mentioned she has a u/s on 5/6 - is that the EUS w/ GI? And can we add her on for tumor board tomorrow as well?  Thanks!    I have spoken with Joe, patient added to thoracic conference today.  I have spoken with [facility], patient is scheduled for EUS on 5/13.

## 2025-04-28 ENCOUNTER — TELEPHONE (OUTPATIENT)
Dept: PULMONOLOGY | Age: 75
End: 2025-04-28

## 2025-04-28 DIAGNOSIS — R91.1 PULMONARY NODULE: ICD-10-CM

## 2025-04-28 DIAGNOSIS — R94.8 ABNORMAL POSITRON EMISSION TOMOGRAPHY (PET) SCAN: ICD-10-CM

## 2025-04-28 DIAGNOSIS — C34.92 ADENOCARCINOMA OF LEFT LUNG (HCC): ICD-10-CM

## 2025-04-28 DIAGNOSIS — R59.9 ADENOPATHY: Primary | ICD-10-CM

## 2025-04-28 NOTE — TELEPHONE ENCOUNTER
Patient discussed at thoracic conference lead by Dr Winter.  Patient is 76 yo with   Final Pathologic Diagnosis    A:  Left lower lobe ground glass nodule, biopsy:   - Pulmonary adenocarcinoma     Patient is scheduled for EUS on 5/13 for level 1L LN.  Dr Winter attempted to BX this area at the time of ION BX.   Specimen Source   A: 2L Lymph Node, Fine Needle Aspiration       Final Diagnosis   NONDIAGNOSTIC   Limited sample.  A significant lymph node sample is not present.  Reactive   bronchial cells.  No malignant cells identified in this sample.     Per group, likely 2 processes.  LLL adenocarcinoma was not very PET +.  Dr Winter has discussed with IR and per Dr Powell, patient should have a NM thyroid uptake scan.     I have spoken with patient.  She allows me to move up her CPFT to 4/30 and is scheduled for NM scan on 5/8.  She is aware that once Dr Winter has reviewed this image, she will be contacted.

## 2025-04-30 ENCOUNTER — CLINICAL SUPPORT (OUTPATIENT)
Dept: PULMONOLOGY | Age: 75
End: 2025-04-30

## 2025-04-30 DIAGNOSIS — R06.09 DYSPNEA ON EXERTION: ICD-10-CM

## 2025-04-30 LAB
FEV 1 , POC: 0.85 L
FEV1 % PRED, POC: 38 %
FEV1/FVC, POC: NORMAL
FVC % PRED, POC: 61 %
FVC, POC: NORMAL

## 2025-04-30 ASSESSMENT — PULMONARY FUNCTION TESTS
FVC_PERCENT_PREDICTED_POC: 61
FEV1_PERCENT_PREDICTED_POC: 38

## 2025-05-01 ENCOUNTER — RESULTS FOLLOW-UP (OUTPATIENT)
Age: 75
End: 2025-05-01

## 2025-05-09 NOTE — TELEPHONE ENCOUNTER
Called radiology scheduling.  They will call patient at this time to schedule NM thyroid uptake scan.

## 2025-05-12 ENCOUNTER — TELEPHONE (OUTPATIENT)
Dept: PULMONOLOGY | Age: 75
End: 2025-05-12

## 2025-05-12 NOTE — TELEPHONE ENCOUNTER
Contacted patient who is asking if thyroid nuclear med studies scheduled for 5/27 & 5/28 were supposed to be done before EGD tomorrow. Advised she should proceed as scheduled tomorrow.   Noted she would like more information about how thyroid scan will be performed, in basic review shares she is concerned about iodine allergy. Notes she had to be given medication to wake up after previous exposure to contrast iodine.    Contacted radiology scheduling to find out if allergies was a screening question as patient has iodine allergy.  Provided w/ # for nuclear med [phone].   Contacted that dept to ask if iodine allergy would prohibit test, phone rang until no signal.

## 2025-05-12 NOTE — TELEPHONE ENCOUNTER
Patient says she is suppose to be having surgery tomorrow . She needs to talk with someone about having the surgery are procedure

## 2025-05-20 NOTE — TELEPHONE ENCOUNTER
Discussion with Dr Winter and approval granted to initiate referral to medical and radiation oncology for discussion of treatment for known LLL adenocarcinoma.  I have notified patient and she is agreeable.  I have attempted to get patient scheduled for Rad Onc appointment while on phone but that office is not available.  She is aware that I will follow along until those appointments are scheduled.

## 2025-05-21 PROBLEM — C34.92 NON-SMALL CELL CANCER OF LEFT LUNG (HCC): Status: ACTIVE | Noted: 2025-05-21

## 2025-05-21 NOTE — PROGRESS NOTES
[facility] RADIATION ONCOLOGY CONSULTATION    Patient: Rafia Donnelly MRN: [id number]  SSN: [id number]    YOB: 1950  Age: 75 y.o.  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 female who I am seeing at the request of Farhan Messer.    Ms. Donnelly is a former smoker with severe COPD and chronic hypoxic respiratory failure on 2 L of oxygen.  On 3/17/2025 a lung screening scan showed a slowly enlarging groundglass lesion in the posterior left lung base.  PET/CT 3/26/2025 showed the area of pleural-based peribronchial consolidation in the lower left lobe demonstrating FDG activity as well as a hypermetabolic left level 4 cervical lymph node.  On 4/10/2025 she underwent bronchoscopy and biopsy by Dr. Winter.  Surgical pathology from a left lung groundglass opacification showed adenocarcinoma.  The cervical lymph node showed limited sample, significant lymph node sample not present, reactive bronchial cells, no malignant cells identified in this sample.  EBUS was performed 5/13/2025.  Final pathology shows minute fragments of thyroid parenchyma with mild follicular hyperplasia, no new nuclear features of papillary thyroid carcinoma identified but cannot be excluded. She has rheumatoid arthritis managed with Etanercept infusions. She is being measured for cataract surgery this week. She has a history of polycythemia previously followed by Dr. Boothe.     PAST MEDICAL HISTORY:    Past Medical History:   Diagnosis Date    Chronic fatigue     COPD (chronic obstructive pulmonary disease) (HCC)     Cubital

## 2025-05-22 ENCOUNTER — HOSPITAL ENCOUNTER (OUTPATIENT)
Dept: RADIATION ONCOLOGY | Age: 75
Setting detail: RECURRING SERIES
Discharge: HOME | End: 2025-05-25
Payer: MEDICARE

## 2025-05-22 VITALS
OXYGEN SATURATION: 93 % | BODY MASS INDEX: 37 KG/M2 | SYSTOLIC BLOOD PRESSURE: 122 MMHG | RESPIRATION RATE: 17 BRPM | HEART RATE: 85 BPM | WEIGHT: 222.1 LBS | HEIGHT: 65 IN | DIASTOLIC BLOOD PRESSURE: 78 MMHG

## 2025-05-22 DIAGNOSIS — C34.92 NON-SMALL CELL CANCER OF LEFT LUNG (HCC): Primary | ICD-10-CM

## 2025-05-22 PROCEDURE — 99211 OFF/OP EST MAY X REQ PHY/QHP: CPT

## 2025-05-22 ASSESSMENT — PAIN SCALES - GENERAL
PAINLEVEL_OUTOF10: 6
PAINLEVEL_OUTOF10: 6

## 2025-05-22 ASSESSMENT — PAIN DESCRIPTION - PAIN TYPE: TYPE: CHRONIC PAIN

## 2025-05-22 NOTE — PROGRESS NOTES
Radiation Oncology - New Patient        Rafia Donnelly  is a 75 y.o. year old female with Lung  cancer who presents for: Initial consult.    Proposed Radiotherapy: External Beam Radiation Therapy-SBRT        Intent of therapy and anticipated number of fractions (length of treatment) reviewed by MD. See MD note.     Bowel Prep:N/A    Menopausal Status: Postmenopausal      Consent for Radiotherapy: Signed and scanned into chart    CT Sim Scheduled: Yes -CT SIM to be scheduled next available.     Pertinent Information:  Path 4-10-25  Thyroid scan 25.  She is taking Zithromax TIW Monday, Wednesday, Friday for about 6 months prescribed by Pulmonary.   New pt apt with Dr. Jenkins on 25-pt last saw Dr. Boothe on 20 for PCV.  She would like to see Dr. Boothe.   Dr. Tran to message Dr. Boothe regarding.       Pain: 0/10-chronic pain to hands, legs and back from RA    Previous Radiation Treatment Reported: No    Pacemaker or Other Chest Implant Reported: No    Collagen Vascular Disease Reported: Yes-RA Enbrel injection every Saturday.     Test Results, if applicable:  PSA:  AUA:  PET / CT / PSMA: Pet 3-26-25.   Colonoscopy:     Education: Reviewed during this visit.     Radiation Therapy Education:    The patient was given handouts for area of planned treatment and potential side effects, frequently asked questions, skin care, and vitamin recommendations for reference.      Self-care guidelines were distributed and discussed with the patient and included the followin) Potential long term and short term side effects of therapy including fertility risks for appropriate patients    2) Symptoms and side effects that require the patient to contact [facility] or require immediate attention    3) The [facility]'s contact information with availability and instructions on who and when to call    4) The [facility] missed appointment policy

## 2025-05-27 ENCOUNTER — HOSPITAL ENCOUNTER (OUTPATIENT)
Dept: NUCLEAR MEDICINE | Age: 75
Discharge: HOME | End: 2025-05-29
Attending: STUDENT IN AN ORGANIZED HEALTH CARE EDUCATION/TRAINING PROGRAM
Payer: MEDICARE

## 2025-05-27 DIAGNOSIS — R91.1 PULMONARY NODULE: ICD-10-CM

## 2025-05-27 DIAGNOSIS — R59.9 ADENOPATHY: ICD-10-CM

## 2025-05-27 DIAGNOSIS — R94.8 ABNORMAL POSITRON EMISSION TOMOGRAPHY (PET) SCAN: ICD-10-CM

## 2025-05-27 PROCEDURE — 3430000000 HC RX DIAGNOSTIC RADIOPHARMACEUTICAL: Performed by: STUDENT IN AN ORGANIZED HEALTH CARE EDUCATION/TRAINING PROGRAM

## 2025-05-27 PROCEDURE — 78830 RP LOCLZJ TUM SPECT W/CT 1: CPT

## 2025-05-27 PROCEDURE — 78830 RP LOCLZJ TUM SPECT W/CT 1: CPT | Performed by: RADIOLOGY

## 2025-05-27 PROCEDURE — 78014 THYROID IMAGING W/BLOOD FLOW: CPT

## 2025-05-27 PROCEDURE — A9516 IODINE I-123 SOD IODIDE MIC: HCPCS | Performed by: STUDENT IN AN ORGANIZED HEALTH CARE EDUCATION/TRAINING PROGRAM

## 2025-05-27 RX ORDER — SODIUM IODIDE I 123 200 UCI/1
538 CAPSULE, GELATIN COATED ORAL ONCE
Status: COMPLETED | OUTPATIENT
Start: 2025-05-27 | End: 2025-05-27

## 2025-05-27 RX ADMIN — SODIUM IODIDE I 123 524 MICRO CURIE: 200 CAPSULE, GELATIN COATED ORAL at 10:20

## 2025-05-28 ENCOUNTER — HOSPITAL ENCOUNTER (OUTPATIENT)
Dept: NUCLEAR MEDICINE | Age: 75
Discharge: HOME | End: 2025-05-30
Attending: STUDENT IN AN ORGANIZED HEALTH CARE EDUCATION/TRAINING PROGRAM

## 2025-05-29 ENCOUNTER — RESULTS FOLLOW-UP (OUTPATIENT)
Dept: PULMONOLOGY | Age: 75
End: 2025-05-29

## 2025-05-29 DIAGNOSIS — E04.1 THYROID NODULE: Primary | ICD-10-CM

## 2025-06-02 ENCOUNTER — CLINICAL DOCUMENTATION (OUTPATIENT)
Dept: CASE MANAGEMENT | Age: 75
End: 2025-06-02

## 2025-06-02 ENCOUNTER — TELEPHONE (OUTPATIENT)
Dept: PULMONOLOGY | Age: 75
End: 2025-06-02

## 2025-06-02 NOTE — TELEPHONE ENCOUNTER
Farhan Winter MD Kisler, Debra L, P  She sees Dr. Jenkins in the near future, I've ordered the thyroid ultrasound to see if we can identify the nodule. Will you let the patient know? IR had thought they would not be able to successfully biopsy the lesion seen on the PET.    I have called patient and have let her know of results of NM scan and need for thyroid U/S.  I have sent her the number for radiology scheduling through [facility].  She will call radiology scheduling to schedule this test.

## 2025-06-02 NOTE — PROGRESS NOTES
Nurse Navigation outreach related to intake    Navigation Intake 6/2/2025  The role of navigation services, how to communicate with office, and pending appointments have been reviewed and updated.  Referring provider notified of intake and upcoming appointments.    PET: 3/26/25  Pathology: 4/10/25-NSCLC  NGS: 6/2/25 Kirti    Current Pulmonologist: Kathrin Bates NP  Appointment with Oncology: 6/4/25-Gregory    My Chart message to patient with navigation contact information and upcoming appointments. 6/2/25  Attached link for calendar for Cancer Support in the Community provided by the [facility] with opportunities for programs both in person and virtually. 6/2/25  Attached link for the [facility] Cancer Connection for counseling opportunities, support groups, financial assistance, and general physical support. 6/2/25    Goal of next outreach: follow up on MedOnc Consult  Time Spent: 8 minutes

## 2025-06-03 ENCOUNTER — HOSPITAL ENCOUNTER (OUTPATIENT)
Dept: ULTRASOUND IMAGING | Age: 75
Discharge: HOME | End: 2025-06-05
Attending: STUDENT IN AN ORGANIZED HEALTH CARE EDUCATION/TRAINING PROGRAM
Payer: MEDICARE

## 2025-06-03 DIAGNOSIS — E04.1 THYROID NODULE: ICD-10-CM

## 2025-06-03 PROCEDURE — 76536 US EXAM OF HEAD AND NECK: CPT

## 2025-06-04 ENCOUNTER — OFFICE VISIT (OUTPATIENT)
Dept: ONCOLOGY | Age: 75
End: 2025-06-04
Payer: MEDICARE

## 2025-06-04 ENCOUNTER — HOSPITAL ENCOUNTER (OUTPATIENT)
Dept: LAB | Age: 75
Discharge: HOME | End: 2025-06-04
Payer: MEDICARE

## 2025-06-04 VITALS
TEMPERATURE: 98.1 F | RESPIRATION RATE: 16 BRPM | WEIGHT: 224.5 LBS | SYSTOLIC BLOOD PRESSURE: 121 MMHG | HEART RATE: 82 BPM | OXYGEN SATURATION: 92 % | DIASTOLIC BLOOD PRESSURE: 76 MMHG | HEIGHT: 65 IN | BODY MASS INDEX: 37.4 KG/M2

## 2025-06-04 DIAGNOSIS — R94.8 ABNORMAL POSITRON EMISSION TOMOGRAPHY (PET) SCAN: ICD-10-CM

## 2025-06-04 DIAGNOSIS — C34.92 NON-SMALL CELL CANCER OF LEFT LUNG (HCC): ICD-10-CM

## 2025-06-04 DIAGNOSIS — C34.92 NON-SMALL CELL CANCER OF LEFT LUNG (HCC): Primary | ICD-10-CM

## 2025-06-04 DIAGNOSIS — Z80.3 FAMILY HISTORY OF BREAST CANCER: ICD-10-CM

## 2025-06-04 DIAGNOSIS — E04.1 THYROID NODULE: ICD-10-CM

## 2025-06-04 LAB
ALBUMIN SERPL-MCNC: 3.8 G/DL (ref 3.2–4.6)
ALBUMIN/GLOB SERPL: 1 (ref 1–1.9)
ALP SERPL-CCNC: 99 U/L (ref 35–104)
ALT SERPL-CCNC: 21 U/L (ref 8–45)
ANION GAP SERPL CALC-SCNC: 12 MMOL/L (ref 7–16)
AST SERPL-CCNC: 20 U/L (ref 15–37)
BASOPHILS # BLD: 0.04 K/UL (ref 0–0.2)
BASOPHILS NFR BLD: 0.6 % (ref 0–2)
BILIRUB SERPL-MCNC: 0.3 MG/DL (ref 0–1.2)
BUN SERPL-MCNC: 14 MG/DL (ref 8–23)
CALCIUM SERPL-MCNC: 10.3 MG/DL (ref 8.8–10.2)
CHLORIDE SERPL-SCNC: 101 MMOL/L (ref 98–107)
CO2 SERPL-SCNC: 29 MMOL/L (ref 20–29)
CREAT SERPL-MCNC: 0.88 MG/DL (ref 0.6–1.1)
DIFFERENTIAL METHOD BLD: ABNORMAL
EOSINOPHIL # BLD: 0.12 K/UL (ref 0–0.8)
EOSINOPHIL NFR BLD: 1.8 % (ref 0.5–7.8)
ERYTHROCYTE [DISTWIDTH] IN BLOOD BY AUTOMATED COUNT: 14.8 % (ref 11.9–14.6)
GLOBULIN SER CALC-MCNC: 3.8 G/DL (ref 2.3–3.5)
GLUCOSE SERPL-MCNC: 106 MG/DL (ref 70–99)
HCT VFR BLD AUTO: 46 % (ref 35.8–46.3)
HGB BLD-MCNC: 14.1 G/DL (ref 11.7–15.4)
IMM GRANULOCYTES # BLD AUTO: 0.02 K/UL (ref 0–0.5)
IMM GRANULOCYTES NFR BLD AUTO: 0.3 % (ref 0–5)
LYMPHOCYTES # BLD: 1.9 K/UL (ref 0.5–4.6)
LYMPHOCYTES NFR BLD: 29 % (ref 13–44)
MCH RBC QN AUTO: 27.1 PG (ref 26.1–32.9)
MCHC RBC AUTO-ENTMCNC: 30.7 G/DL (ref 31.4–35)
MCV RBC AUTO: 88.3 FL (ref 82–102)
MONOCYTES # BLD: 0.83 K/UL (ref 0.1–1.3)
MONOCYTES NFR BLD: 12.7 % (ref 4–12)
NEUTS SEG # BLD: 3.64 K/UL (ref 1.7–8.2)
NEUTS SEG NFR BLD: 55.6 % (ref 43–78)
NRBC # BLD: 0 K/UL (ref 0–0.2)
PLATELET # BLD AUTO: 300 K/UL (ref 150–450)
PMV BLD AUTO: 8.5 FL (ref 9.4–12.3)
POTASSIUM SERPL-SCNC: 4.9 MMOL/L (ref 3.5–5.1)
PROT SERPL-MCNC: 7.5 G/DL (ref 6.3–8.2)
RBC # BLD AUTO: 5.21 M/UL (ref 4.05–5.2)
SODIUM SERPL-SCNC: 142 MMOL/L (ref 136–145)
WBC # BLD AUTO: 6.6 K/UL (ref 4.3–11.1)

## 2025-06-04 PROCEDURE — G8417 CALC BMI ABV UP PARAM F/U: HCPCS | Performed by: STUDENT IN AN ORGANIZED HEALTH CARE EDUCATION/TRAINING PROGRAM

## 2025-06-04 PROCEDURE — 85025 COMPLETE CBC W/AUTO DIFF WBC: CPT

## 2025-06-04 PROCEDURE — 4004F PT TOBACCO SCREEN RCVD TLK: CPT | Performed by: STUDENT IN AN ORGANIZED HEALTH CARE EDUCATION/TRAINING PROGRAM

## 2025-06-04 PROCEDURE — 36415 COLL VENOUS BLD VENIPUNCTURE: CPT

## 2025-06-04 PROCEDURE — 1123F ACP DISCUSS/DSCN MKR DOCD: CPT | Performed by: STUDENT IN AN ORGANIZED HEALTH CARE EDUCATION/TRAINING PROGRAM

## 2025-06-04 PROCEDURE — 99205 OFFICE O/P NEW HI 60 MIN: CPT | Performed by: STUDENT IN AN ORGANIZED HEALTH CARE EDUCATION/TRAINING PROGRAM

## 2025-06-04 PROCEDURE — 1159F MED LIST DOCD IN RCRD: CPT | Performed by: STUDENT IN AN ORGANIZED HEALTH CARE EDUCATION/TRAINING PROGRAM

## 2025-06-04 PROCEDURE — 3017F COLORECTAL CA SCREEN DOC REV: CPT | Performed by: STUDENT IN AN ORGANIZED HEALTH CARE EDUCATION/TRAINING PROGRAM

## 2025-06-04 PROCEDURE — 1125F AMNT PAIN NOTED PAIN PRSNT: CPT | Performed by: STUDENT IN AN ORGANIZED HEALTH CARE EDUCATION/TRAINING PROGRAM

## 2025-06-04 PROCEDURE — G8399 PT W/DXA RESULTS DOCUMENT: HCPCS | Performed by: STUDENT IN AN ORGANIZED HEALTH CARE EDUCATION/TRAINING PROGRAM

## 2025-06-04 PROCEDURE — 1090F PRES/ABSN URINE INCON ASSESS: CPT | Performed by: STUDENT IN AN ORGANIZED HEALTH CARE EDUCATION/TRAINING PROGRAM

## 2025-06-04 PROCEDURE — 80053 COMPREHEN METABOLIC PANEL: CPT

## 2025-06-04 PROCEDURE — 3078F DIAST BP <80 MM HG: CPT | Performed by: STUDENT IN AN ORGANIZED HEALTH CARE EDUCATION/TRAINING PROGRAM

## 2025-06-04 PROCEDURE — 3074F SYST BP LT 130 MM HG: CPT | Performed by: STUDENT IN AN ORGANIZED HEALTH CARE EDUCATION/TRAINING PROGRAM

## 2025-06-04 PROCEDURE — G8427 DOCREV CUR MEDS BY ELIG CLIN: HCPCS | Performed by: STUDENT IN AN ORGANIZED HEALTH CARE EDUCATION/TRAINING PROGRAM

## 2025-06-04 PROCEDURE — G2211 COMPLEX E/M VISIT ADD ON: HCPCS | Performed by: STUDENT IN AN ORGANIZED HEALTH CARE EDUCATION/TRAINING PROGRAM

## 2025-06-04 PROCEDURE — 1160F RVW MEDS BY RX/DR IN RCRD: CPT | Performed by: STUDENT IN AN ORGANIZED HEALTH CARE EDUCATION/TRAINING PROGRAM

## 2025-06-04 SDOH — ECONOMIC STABILITY: FOOD INSECURITY: WITHIN THE PAST 12 MONTHS, YOU WORRIED THAT YOUR FOOD WOULD RUN OUT BEFORE YOU GOT MONEY TO BUY MORE.: SOMETIMES TRUE

## 2025-06-04 SDOH — ECONOMIC STABILITY: TRANSPORTATION INSECURITY
IN THE PAST 12 MONTHS, HAS THE LACK OF TRANSPORTATION KEPT YOU FROM MEDICAL APPOINTMENTS OR FROM GETTING MEDICATIONS?: NO

## 2025-06-04 SDOH — HEALTH STABILITY: PHYSICAL HEALTH: ON AVERAGE, HOW MANY MINUTES DO YOU ENGAGE IN EXERCISE AT THIS LEVEL?: 0 MIN

## 2025-06-04 SDOH — ECONOMIC STABILITY: TRANSPORTATION INSECURITY
IN THE PAST 12 MONTHS, HAS LACK OF TRANSPORTATION KEPT YOU FROM MEETINGS, WORK, OR FROM GETTING THINGS NEEDED FOR DAILY LIVING?: NO

## 2025-06-04 SDOH — ECONOMIC STABILITY: FOOD INSECURITY: WITHIN THE PAST 12 MONTHS, THE FOOD YOU BOUGHT JUST DIDN'T LAST AND YOU DIDN'T HAVE MONEY TO GET MORE.: SOMETIMES TRUE

## 2025-06-04 SDOH — ECONOMIC STABILITY: INCOME INSECURITY: IN THE LAST 12 MONTHS, WAS THERE A TIME WHEN YOU WERE NOT ABLE TO PAY THE MORTGAGE OR RENT ON TIME?: NO

## 2025-06-04 SDOH — HEALTH STABILITY: PHYSICAL HEALTH: ON AVERAGE, HOW MANY DAYS PER WEEK DO YOU ENGAGE IN MODERATE TO STRENUOUS EXERCISE (LIKE A BRISK WALK)?: 0 DAYS

## 2025-06-04 ASSESSMENT — PATIENT HEALTH QUESTIONNAIRE - PHQ9
SUM OF ALL RESPONSES TO PHQ QUESTIONS 1-9: 0
SUM OF ALL RESPONSES TO PHQ QUESTIONS 1-9: 0
2. FEELING DOWN, DEPRESSED OR HOPELESS: NOT AT ALL
SUM OF ALL RESPONSES TO PHQ QUESTIONS 1-9: 0
2. FEELING DOWN, DEPRESSED OR HOPELESS: NOT AT ALL
SUM OF ALL RESPONSES TO PHQ QUESTIONS 1-9: 0
1. LITTLE INTEREST OR PLEASURE IN DOING THINGS: NOT AT ALL
SUM OF ALL RESPONSES TO PHQ QUESTIONS 1-9: 0
1. LITTLE INTEREST OR PLEASURE IN DOING THINGS: NOT AT ALL
SUM OF ALL RESPONSES TO PHQ QUESTIONS 1-9: 0

## 2025-06-04 ASSESSMENT — SOCIAL DETERMINANTS OF HEALTH (SDOH)
HOW OFTEN DO YOU GET TOGETHER WITH FRIENDS OR RELATIVES?: MORE THAN THREE TIMES A WEEK
IN A TYPICAL WEEK, HOW MANY TIMES DO YOU TALK ON THE PHONE WITH FAMILY, FRIENDS, OR NEIGHBORS?: MORE THAN THREE TIMES A WEEK
HOW OFTEN DO YOU ATTEND CHURCH OR RELIGIOUS SERVICES?: MORE THAN 4 TIMES PER YEAR
WITHIN THE LAST YEAR, HAVE YOU BEEN HUMILIATED OR EMOTIONALLY ABUSED IN OTHER WAYS BY YOUR PARTNER OR EX-PARTNER?: NO
HOW HARD IS IT FOR YOU TO PAY FOR THE VERY BASICS LIKE FOOD, HOUSING, MEDICAL CARE, AND HEATING?: SOMEWHAT HARD
WITHIN THE LAST YEAR, HAVE YOU BEEN KICKED, HIT, SLAPPED, OR OTHERWISE PHYSICALLY HURT BY YOUR PARTNER OR EX-PARTNER?: NO
HOW OFTEN DO YOU ATTENT MEETINGS OF THE CLUB OR ORGANIZATION YOU BELONG TO?: NEVER
DO YOU BELONG TO ANY CLUBS OR ORGANIZATIONS SUCH AS CHURCH GROUPS UNIONS, FRATERNAL OR ATHLETIC GROUPS, OR SCHOOL GROUPS?: NO
WITHIN THE LAST YEAR, HAVE YOU BEEN AFRAID OF YOUR PARTNER OR EX-PARTNER?: NO
WITHIN THE LAST YEAR, HAVE TO BEEN RAPED OR FORCED TO HAVE ANY KIND OF SEXUAL ACTIVITY BY YOUR PARTNER OR EX-PARTNER?: NO

## 2025-06-04 ASSESSMENT — LIFESTYLE VARIABLES
HOW MANY STANDARD DRINKS CONTAINING ALCOHOL DO YOU HAVE ON A TYPICAL DAY: PATIENT DOES NOT DRINK
HOW OFTEN DO YOU HAVE A DRINK CONTAINING ALCOHOL: NEVER

## 2025-06-04 NOTE — PROGRESS NOTES
NEW PATIENT ABSTRACT            Referral Diagnosis: Adenocarcinoma of the Left Lung    Referring Provider: Farhan Winter MD    Primary Care Provider: Conner Ruth MD    Presenting Symptoms: CT Lung Screening    Family History of Cancer: Cancer-related family history includes Breast Cancer in her maternal aunt; Breast Cancer (age of onset: 41) in her sister; Breast Cancer (age of onset: 60) in her mother.    Past Medical History:   Past Medical History:   Diagnosis Date    Chronic fatigue     COPD (chronic obstructive pulmonary disease) (HCC)     Cubital tunnel syndrome on left 4/30/2021    Depression     Diverticulitis     Emphysema     Essential hypertension 6/10/2018    GERD (gastroesophageal reflux disease) 6/10/2018    Obesity     Psychiatric disorder     anxiety, depression    Skin cancer     TIA (transient ischemic attack)     Vitamin D deficiency        Chronological History of Pertinent Events (From Onset of Presenting Symptoms):Rafia Donnelly is a 75 y.o. female. She was seen by Dr Tran on 5/22/25, who states \"Ms. Donnelly is a former smoker with severe COPD and chronic hypoxic respiratory failure on 2 L of oxygen.  On 3/17/2025 a lung screening scan showed a slowly enlarging groundglass lesion in the posterior left lung base.  PET/CT 3/26/2025 showed the area of pleural-based peribronchial consolidation in the lower left lobe demonstrating FDG activity as well as a hypermetabolic left level 4 cervical lymph node.  On 4/10/2025 she underwent bronchoscopy and biopsy by Dr. Winter.  Surgical pathology from a left lung groundglass opacification showed adenocarcinoma.  The cervical lymph node showed limited sample, significant lymph node sample not present, reactive bronchial cells, no malignant cells identified in this sample.  EUS was performed 5/13/2025 (at [facility] with Dr Wallaec).  Final pathology shows minute fragments of thyroid parenchyma with mild follicular hyperplasia, no new nuclear

## 2025-06-04 NOTE — PATIENT INSTRUCTIONS
Patient Information from Today's Visit    The members of your [facility]cology Medical Home are listed below:    Physician Provider: Dr. Freda Jenkins  Advanced Practice Clinician: Love Obrien  Registered Nurse: Hortencia SMART  Nurse Navigator: Nawaf MCMANUS RN  Medical Assistant: [place] SMART  : Niurka SAUCEDO  Supportive Care Services: LACEY Francois    Diagnosis (Information Sheet Provided on Day of Diagnosis): Lung Cancer    Follow Up Instructions:   - Labs to be drawn today  - MRI brain ordered. You can call to schedule this at [phone].   - We are sending you to endocrinology for the thyroid nodule. They should call you to schedule that appointment.  - We are also referring you to genetics and they will call you.    Has Treatment Plan Been Finalized? No    Current Labs: NA, will get labs today      Please refer to After Visit Summary or [employer] for upcoming appointment information. Please call our office for rescheduling needs at least 24 hours before your scheduled appointment time.If you have any questions regarding your upcoming schedule please reach out to your care team through [employer] or call [phone].     Please notify your assigned Nurse Navigator of any unplanned hospital admissions or Emergency Room visits within 24 hours of discharge.    -------------------------------------------------------------------------------------------------------------------  Please call our office at [phone] if you have any  of the following symptoms:   Fever of 100.5 or greater  Chills  Shortness of breath  Swelling or pain in one leg    After office hours an answering service is available and will contact a provider for emergencies or if you are experiencing any of the above symptoms.      HORTENCIA GALEANO RN

## 2025-06-04 NOTE — PROGRESS NOTES
Feeling of Stress : Rather much   Social Connections: Moderately Isolated (6/4/2025)    Social Connection and Isolation Panel [NHANES]     Frequency of Communication with Friends and Family: More than three times a week     Frequency of Social Gatherings with Friends and Family: More than three times a week     Attends Mu-ism Services: More than 4 times per year     Active Member of Clubs or Organizations: No     Attends Club or Organization Meetings: Never     Marital Status:    Intimate Partner Violence: Not At Risk (6/4/2025)    Humiliation, Afraid, Rape, and Kick questionnaire     Fear of Current or Ex-Partner: No     Emotionally Abused: No     Physically Abused: No     Sexually Abused: No   Housing Stability: Low Risk  (6/4/2025)    Housing Stability Vital Sign     Unable to Pay for Housing in the Last Year: No     Number of Times Moved in the Last Year: 0     Homeless in the Last Year: No     Current Outpatient Medications   Medication Sig Dispense Refill    azithromycin (ZITHROMAX) 250 MG tablet TAKE ONE TABLET BY MOUTH 3 TIMES A WEEK 36 tablet 3    albuterol (PROVENTIL) (2.5 MG/3ML) 0.083% nebulizer solution Take 3 mLs by nebulization every 4 hours as needed for Wheezing Dx code j44.9 360 mL 11    albuterol sulfate HFA (PROVENTIL;VENTOLIN;PROAIR) 108 (90 Base) MCG/ACT inhaler Inhale 2 puffs into the lungs every 4 hours as needed for Wheezing Ventolin HFA 90 mcg/actuation aerosol inhaler dx code j44.9 3 each 3    fluticasone-umeclidin-vilant (TRELEGY ELLIPTA) 100-62.5-25 MCG/ACT AEPB inhaler Inhale 1 puff into the lungs daily Dx code j44.9 3 each 3    Etanercept 50 MG/ML SOAJ Inject 50 mg into the skin once a week      varenicline (CHANTIX) 1 MG tablet Take 1 tablet by mouth 2 times daily 60 tablet 3    venlafaxine (EFFEXOR) 75 MG tablet Take 1 tablet by mouth daily      OXYGEN 4 lpm during sleep and exertion.      aspirin 81 MG EC tablet Take by mouth daily      vitamin D3 (CHOLECALCIFEROL) 125 MCG

## 2025-06-05 ENCOUNTER — CLINICAL DOCUMENTATION (OUTPATIENT)
Dept: CASE MANAGEMENT | Age: 75
End: 2025-06-05

## 2025-06-05 DIAGNOSIS — Z59.86 FINANCIAL INSECURITY DUE TO MEDICAL EXPENSES: ICD-10-CM

## 2025-06-05 DIAGNOSIS — Z63.8 CAREGIVER ROLE STRAIN: Primary | ICD-10-CM

## 2025-06-05 SDOH — ECONOMIC STABILITY - INCOME SECURITY: FINANCIAL INSECURITY: Z59.86

## 2025-06-05 SDOH — SOCIAL STABILITY - SOCIAL INSECURITY: OTHER SPECIFIED PROBLEMS RELATED TO PRIMARY SUPPORT GROUP: Z63.8

## 2025-06-05 NOTE — PROGRESS NOTES
Nurse Navigation outreach related to care coordination and medical oncology consult visit    Nurse navigation reviewed appointments and [employer]. Oncology Medical Home information and education on how to contact our office was provided via [employer] on 6/2.    Navigation Assessment:  The following were identified as potential barriers to care:   1. At risk for financial insecurity   2. At risk for caregiver strain   3. Food insecurity  Interventions and Plan:    Referrals placed: Spiritual Services [[id number]] and Social Work [REF98] and referral to supportive care conference  Goal of next outreach: follow up after treatment decisions are made to address barriers to care   Time Spent: 8 minutes

## 2025-06-09 ENCOUNTER — CLINICAL DOCUMENTATION (OUTPATIENT)
Dept: ONCOLOGY | Age: 75
End: 2025-06-09

## 2025-06-09 NOTE — PROGRESS NOTES
Nurse Navigation outreach related to care coordination    Other patient returned my call from Friday    Navigation Assessment:  The following were identified as potential barriers to care:   1. claustrophobia   2. At risk for caregiver fatigue  Interventions and Plan: MD and RN notified of iodine allergy and claustrophobia    Referrals placed:  referrals were placed at consult  Goal of next outreach: follow up after staging and treatment decisions are made  Time Spent: 6 minutes

## 2025-06-10 ENCOUNTER — HOSPITAL ENCOUNTER (OUTPATIENT)
Dept: RADIATION ONCOLOGY | Age: 75
Setting detail: RECURRING SERIES
Discharge: HOME | End: 2025-06-13
Payer: MEDICARE

## 2025-06-10 PROCEDURE — 77334 RADIATION TREATMENT AID(S): CPT

## 2025-06-11 DIAGNOSIS — Z86.59 HISTORY OF CLAUSTROPHOBIA: Primary | ICD-10-CM

## 2025-06-11 RX ORDER — LORAZEPAM 0.5 MG/1
0.5 TABLET ORAL PRN
Qty: 2 TABLET | Refills: 0 | Status: SHIPPED | OUTPATIENT
Start: 2025-06-11 | End: 2025-06-12

## 2025-06-12 ENCOUNTER — CLINICAL DOCUMENTATION (OUTPATIENT)
Dept: CASE MANAGEMENT | Age: 75
End: 2025-06-12

## 2025-06-12 ENCOUNTER — TELEPHONE (OUTPATIENT)
Age: 75
End: 2025-06-12

## 2025-06-12 NOTE — PROGRESS NOTES
Nurse Navigation outreach related to imaging  Advised patient that Dr Jenkins prescribed Ativan for her MRI.    Goal of next outreach: follow up after staging and after treatment decisions are made   Time Spent: 5 minutes

## 2025-06-12 NOTE — TELEPHONE ENCOUNTER
's phone call to Ms. Donnelly, preferred name Katlyn, due to a referral for spiritual care. The purpose of the call was to convey care and concern and to explore any spiritual/emotional concerns. The phone was unanswered and I left a voicemail requesting a call back at [phone].     NITIN Shen (Board-Certified ) on 6/12/2025 at 9:34 AM  [facility]  [phone]

## 2025-06-17 ENCOUNTER — CLINICAL DOCUMENTATION (OUTPATIENT)
Dept: ONCOLOGY | Age: 75
End: 2025-06-17

## 2025-06-17 ENCOUNTER — CLINICAL DOCUMENTATION (OUTPATIENT)
Dept: CASE MANAGEMENT | Age: 75
End: 2025-06-17

## 2025-06-17 NOTE — PROGRESS NOTES
Chart review per lung navigation.  Patient presented at [facility].

## 2025-06-17 NOTE — PROGRESS NOTES
Hereditary Cancer Clinic - Initial Evaluation   Patient: Rafia Donnelly   YOB: 1950      Location: [facility] HEMATOLOGY AND ONCOLOGY - Provider participated in today's evaluation via telemedicine in [place], [place]. Patient completed today's evaluation from [place].   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 Rafia has consented to a visit via telehealth.  Personalized risk assessment was performed and genetic testing options were reviewed.  For full details, please see Risk Assessment and Discussion in this document.  Following genetic counseling, Rafia's action plan is:    PURSUES TESTING: CancerNext-Expanded+RNAinsight panel of 85 genes associated with hereditary cancer (including BRCA1 and BRCA2) was offered and accepted.  Test results should be available approximately 3-4 weeks after the sample is received.     Relevant Personal Medical History   Rafia is a 75-year-old female with adenocarcinoma of the lung. She also has a history of skin cancer (melanoma).     Hormonal history:  Age of Menarche: 15  Age of First Live Birth: 26  Age of Menopause: 47  Hormonal Replacement Therapy: Denies    Screening history:  Last mammogram: A couple years ago  History of breast biopsy: Denies  Last pap smear: Not for a while  Latest colonoscopy: April 2025  History of polyps: 2 in last time  Last dermatological exam: Going this month (has been 3-4 months)    Social history:  Tobacco use: Formerly  Alcohol use: Formerly    General medical history:  Past Medical History:   Diagnosis

## 2025-06-19 ENCOUNTER — OFFICE VISIT (OUTPATIENT)
Dept: ENDOCRINOLOGY | Age: 75
End: 2025-06-19
Payer: MEDICARE

## 2025-06-19 VITALS
DIASTOLIC BLOOD PRESSURE: 68 MMHG | SYSTOLIC BLOOD PRESSURE: 122 MMHG | HEIGHT: 63 IN | OXYGEN SATURATION: 97 % | BODY MASS INDEX: 39.87 KG/M2 | HEART RATE: 90 BPM | WEIGHT: 225 LBS

## 2025-06-19 DIAGNOSIS — E04.2 MULTIPLE THYROID NODULES: Primary | ICD-10-CM

## 2025-06-19 DIAGNOSIS — R59.0 CERVICAL LYMPHADENOPATHY: ICD-10-CM

## 2025-06-19 DIAGNOSIS — E04.2 MULTIPLE THYROID NODULES: ICD-10-CM

## 2025-06-19 LAB — TSH W FREE THYROID IF ABNORMAL: 2.32 UIU/ML (ref 0.27–4.2)

## 2025-06-19 PROCEDURE — 3078F DIAST BP <80 MM HG: CPT | Performed by: INTERNAL MEDICINE

## 2025-06-19 PROCEDURE — 1090F PRES/ABSN URINE INCON ASSESS: CPT | Performed by: INTERNAL MEDICINE

## 2025-06-19 PROCEDURE — 1159F MED LIST DOCD IN RCRD: CPT | Performed by: INTERNAL MEDICINE

## 2025-06-19 PROCEDURE — 1123F ACP DISCUSS/DSCN MKR DOCD: CPT | Performed by: INTERNAL MEDICINE

## 2025-06-19 PROCEDURE — 4004F PT TOBACCO SCREEN RCVD TLK: CPT | Performed by: INTERNAL MEDICINE

## 2025-06-19 PROCEDURE — 3017F COLORECTAL CA SCREEN DOC REV: CPT | Performed by: INTERNAL MEDICINE

## 2025-06-19 PROCEDURE — G2211 COMPLEX E/M VISIT ADD ON: HCPCS | Performed by: INTERNAL MEDICINE

## 2025-06-19 PROCEDURE — G8417 CALC BMI ABV UP PARAM F/U: HCPCS | Performed by: INTERNAL MEDICINE

## 2025-06-19 PROCEDURE — G8427 DOCREV CUR MEDS BY ELIG CLIN: HCPCS | Performed by: INTERNAL MEDICINE

## 2025-06-19 PROCEDURE — 3074F SYST BP LT 130 MM HG: CPT | Performed by: INTERNAL MEDICINE

## 2025-06-19 PROCEDURE — 99204 OFFICE O/P NEW MOD 45 MIN: CPT | Performed by: INTERNAL MEDICINE

## 2025-06-19 PROCEDURE — G8399 PT W/DXA RESULTS DOCUMENT: HCPCS | Performed by: INTERNAL MEDICINE

## 2025-06-19 RX ORDER — VITAMIN B COMPLEX
1 TABLET ORAL DAILY
COMMUNITY

## 2025-06-19 ASSESSMENT — ENCOUNTER SYMPTOMS
DIARRHEA: 0
CONSTIPATION: 0

## 2025-06-19 NOTE — PROGRESS NOTES
Jona Guajardo MD, [facility]  [address] [place] [address], [address]  [place] [address]  Phone [phone]  Facsimile [phone]          Rafia Donnelly is a 75 y.o. female seen 6/19/2025 at the request of Dr. Jenkins for the evaluation of thyroid nodule        ASSESSMENT AND PLAN:    1. Multiple thyroid nodules  I reviewed the images from her recent outside thyroid ultrasound and I performed a limited thyroid ultrasound in the office today.  She is status post endoscopic transbronchial biopsy of the left pulmonary nodule revealing non-small cell lung cancer.  There was also a PET positive left level 4 lymph node noted on recent PET/CT.  The pulmonologist performed a biopsy of what he presumed to be the abnormal left level 4 lymph node, but it was nondiagnostic.  I am unable to locate an abnormal left level 4 lymph node on ultrasound today.  Her outside ultrasound did not identify any abnormal cervical lymph nodes either.  I reviewed her operative report from Dr. Wallace (gastroenterology).  According to his note, it sounds like he performed a transesophageal biopsy of the hypoechoic mid left lobe thyroid nodule and isoechoic mid left lobe thyroid nodule 5/2025 with benign cytology.  In his note he mentioned that he thought this represented a lymph node, but his description is consistent with the nodules within the left lobe of the thyroid gland.  Of note, none of the thyroid nodules were PET positive.  For these reasons, I have recommended close observation.  I will have her return in 4 months with a thyroid ultrasound performed in radiology prior to her visit.  I will assess her thyroid function today.    2. Cervical lymphadenopathy  See above discussion.            Procedures:    Limited thyroid ultrasound 6/19/2025: In the mid right lobe posteriorly there is a solid hypoechoic nodule measuring 0.90 x 0.94 x 0.99 cm without microcalcifications (TR 4).  Just

## 2025-06-20 ENCOUNTER — RESULTS FOLLOW-UP (OUTPATIENT)
Dept: ENDOCRINOLOGY | Age: 75
End: 2025-06-20

## 2025-06-20 ENCOUNTER — TELEPHONE (OUTPATIENT)
Age: 75
End: 2025-06-20

## 2025-06-20 NOTE — TELEPHONE ENCOUNTER
's phone call to Ana, patient's preferred name, as a result of a spiritual care referral. Ana answered the phone; however, she was busy and unable to talk. I informed Katlyn that I would send her [facility] in order to explore any needs she may have for spiritual/emotional support. I was glad to hear Ana's voice over the phone. I am glad to offer support. I remain available in person or virtually for visits as a member of the interdisciplinary team at the cancer center.     NITIN Shen (Board-Certified ) on 6/20/2025 at 11:46 AM  [facility]  [phone]

## 2025-06-23 ENCOUNTER — HOSPITAL ENCOUNTER (OUTPATIENT)
Dept: RADIATION ONCOLOGY | Age: 75
Setting detail: RECURRING SERIES
Discharge: HOME | End: 2025-06-26
Payer: MEDICARE

## 2025-06-23 LAB
RAD ONC ARIA COURSE FIRST TREATMENT DATE: NORMAL
RAD ONC ARIA COURSE ID: NORMAL
RAD ONC ARIA COURSE INTENT: NORMAL
RAD ONC ARIA COURSE LAST TREATMENT DATE: NORMAL
RAD ONC ARIA COURSE SESSION NUMBER: 1
RAD ONC ARIA COURSE START DATE: NORMAL
RAD ONC ARIA COURSE TREATMENT ELAPSED DAYS: 0
RAD ONC ARIA PLAN FRACTIONS TREATED TO DATE: 1
RAD ONC ARIA PLAN ID: NORMAL
RAD ONC ARIA PLAN PRESCRIBED DOSE PER FRACTION: 10 GY
RAD ONC ARIA PLAN PRIMARY REFERENCE POINT: NORMAL
RAD ONC ARIA PLAN TOTAL FRACTIONS PRESCRIBED: 5
RAD ONC ARIA PLAN TOTAL PRESCRIBED DOSE: 5000 CGY
RAD ONC ARIA REFERENCE POINT DOSAGE GIVEN TO DATE: 10 GY
RAD ONC ARIA REFERENCE POINT ID: NORMAL
RAD ONC ARIA REFERENCE POINT SESSION DOSAGE GIVEN: 10 GY

## 2025-06-23 PROCEDURE — 77373 STRTCTC BDY RAD THER TX DLVR: CPT

## 2025-06-24 ENCOUNTER — HOSPITAL ENCOUNTER (OUTPATIENT)
Dept: RADIATION ONCOLOGY | Age: 75
Setting detail: RECURRING SERIES
Discharge: HOME | End: 2025-06-27
Payer: MEDICARE

## 2025-06-24 DIAGNOSIS — E04.1 THYROID NODULE: ICD-10-CM

## 2025-06-24 DIAGNOSIS — C34.92 NON-SMALL CELL CANCER OF LEFT LUNG (HCC): Primary | ICD-10-CM

## 2025-06-24 LAB
RAD ONC ARIA COURSE FIRST TREATMENT DATE: NORMAL
RAD ONC ARIA COURSE ID: NORMAL
RAD ONC ARIA COURSE INTENT: NORMAL
RAD ONC ARIA COURSE LAST TREATMENT DATE: NORMAL
RAD ONC ARIA COURSE SESSION NUMBER: 2
RAD ONC ARIA COURSE START DATE: NORMAL
RAD ONC ARIA COURSE TREATMENT ELAPSED DAYS: 1
RAD ONC ARIA PLAN FRACTIONS TREATED TO DATE: 2
RAD ONC ARIA PLAN ID: NORMAL
RAD ONC ARIA PLAN PRESCRIBED DOSE PER FRACTION: 10 GY
RAD ONC ARIA PLAN PRIMARY REFERENCE POINT: NORMAL
RAD ONC ARIA PLAN TOTAL FRACTIONS PRESCRIBED: 5
RAD ONC ARIA PLAN TOTAL PRESCRIBED DOSE: 5000 CGY
RAD ONC ARIA REFERENCE POINT DOSAGE GIVEN TO DATE: 20 GY
RAD ONC ARIA REFERENCE POINT ID: NORMAL
RAD ONC ARIA REFERENCE POINT SESSION DOSAGE GIVEN: 10 GY

## 2025-06-24 PROCEDURE — 77373 STRTCTC BDY RAD THER TX DLVR: CPT

## 2025-06-25 ENCOUNTER — HOSPITAL ENCOUNTER (OUTPATIENT)
Dept: LAB | Age: 75
Discharge: HOME | End: 2025-06-25
Payer: MEDICARE

## 2025-06-25 ENCOUNTER — HOSPITAL ENCOUNTER (OUTPATIENT)
Dept: RADIATION ONCOLOGY | Age: 75
Setting detail: RECURRING SERIES
Discharge: HOME | End: 2025-06-28
Payer: MEDICARE

## 2025-06-25 DIAGNOSIS — C34.92 NON-SMALL CELL CANCER OF LEFT LUNG (HCC): ICD-10-CM

## 2025-06-25 DIAGNOSIS — E04.1 THYROID NODULE: ICD-10-CM

## 2025-06-25 LAB
ALBUMIN SERPL-MCNC: 3.7 G/DL (ref 3.2–4.6)
ALBUMIN/GLOB SERPL: 1 (ref 1–1.9)
ALP SERPL-CCNC: 86 U/L (ref 35–104)
ALT SERPL-CCNC: 22 U/L (ref 8–45)
ANION GAP SERPL CALC-SCNC: 10 MMOL/L (ref 7–16)
AST SERPL-CCNC: 22 U/L (ref 15–37)
BASOPHILS # BLD: 0.04 K/UL (ref 0–0.2)
BASOPHILS NFR BLD: 0.6 % (ref 0–2)
BILIRUB SERPL-MCNC: 0.5 MG/DL (ref 0–1.2)
BUN SERPL-MCNC: 12 MG/DL (ref 8–23)
CALCIUM SERPL-MCNC: 9.5 MG/DL (ref 8.8–10.2)
CHLORIDE SERPL-SCNC: 101 MMOL/L (ref 98–107)
CO2 SERPL-SCNC: 27 MMOL/L (ref 20–29)
CREAT SERPL-MCNC: 0.9 MG/DL (ref 0.6–1.1)
DIFFERENTIAL METHOD BLD: ABNORMAL
EOSINOPHIL # BLD: 0.12 K/UL (ref 0–0.8)
EOSINOPHIL NFR BLD: 1.9 % (ref 0.5–7.8)
ERYTHROCYTE [DISTWIDTH] IN BLOOD BY AUTOMATED COUNT: 14.6 % (ref 11.9–14.6)
GLOBULIN SER CALC-MCNC: 3.6 G/DL (ref 2.3–3.5)
GLUCOSE SERPL-MCNC: 102 MG/DL (ref 70–99)
HCT VFR BLD AUTO: 45 % (ref 35.8–46.3)
HGB BLD-MCNC: 14.3 G/DL (ref 11.7–15.4)
IMM GRANULOCYTES # BLD AUTO: 0.02 K/UL (ref 0–0.5)
IMM GRANULOCYTES NFR BLD AUTO: 0.3 % (ref 0–5)
LYMPHOCYTES # BLD: 1.33 K/UL (ref 0.5–4.6)
LYMPHOCYTES NFR BLD: 20.8 % (ref 13–44)
MCH RBC QN AUTO: 27.7 PG (ref 26.1–32.9)
MCHC RBC AUTO-ENTMCNC: 31.8 G/DL (ref 31.4–35)
MCV RBC AUTO: 87 FL (ref 82–102)
MONOCYTES # BLD: 0.97 K/UL (ref 0.1–1.3)
MONOCYTES NFR BLD: 15.2 % (ref 4–12)
NEUTS SEG # BLD: 3.91 K/UL (ref 1.7–8.2)
NEUTS SEG NFR BLD: 61.2 % (ref 43–78)
NRBC # BLD: 0 K/UL (ref 0–0.2)
PLATELET # BLD AUTO: 282 K/UL (ref 150–450)
PMV BLD AUTO: 8.9 FL (ref 9.4–12.3)
POTASSIUM SERPL-SCNC: 4.6 MMOL/L (ref 3.5–5.1)
PROT SERPL-MCNC: 7.3 G/DL (ref 6.3–8.2)
RAD ONC ARIA COURSE FIRST TREATMENT DATE: NORMAL
RAD ONC ARIA COURSE ID: NORMAL
RAD ONC ARIA COURSE INTENT: NORMAL
RAD ONC ARIA COURSE LAST TREATMENT DATE: NORMAL
RAD ONC ARIA COURSE SESSION NUMBER: 3
RAD ONC ARIA COURSE START DATE: NORMAL
RAD ONC ARIA COURSE TREATMENT ELAPSED DAYS: 2
RAD ONC ARIA PLAN FRACTIONS TREATED TO DATE: 3
RAD ONC ARIA PLAN ID: NORMAL
RAD ONC ARIA PLAN PRESCRIBED DOSE PER FRACTION: 10 GY
RAD ONC ARIA PLAN PRIMARY REFERENCE POINT: NORMAL
RAD ONC ARIA PLAN TOTAL FRACTIONS PRESCRIBED: 5
RAD ONC ARIA PLAN TOTAL PRESCRIBED DOSE: 5000 CGY
RAD ONC ARIA REFERENCE POINT DOSAGE GIVEN TO DATE: 30 GY
RAD ONC ARIA REFERENCE POINT ID: NORMAL
RAD ONC ARIA REFERENCE POINT SESSION DOSAGE GIVEN: 10 GY
RBC # BLD AUTO: 5.17 M/UL (ref 4.05–5.2)
SODIUM SERPL-SCNC: 138 MMOL/L (ref 136–145)
WBC # BLD AUTO: 6.4 K/UL (ref 4.3–11.1)

## 2025-06-25 PROCEDURE — 36415 COLL VENOUS BLD VENIPUNCTURE: CPT

## 2025-06-25 PROCEDURE — 80053 COMPREHEN METABOLIC PANEL: CPT

## 2025-06-25 PROCEDURE — 77373 STRTCTC BDY RAD THER TX DLVR: CPT

## 2025-06-25 PROCEDURE — 85025 COMPLETE CBC W/AUTO DIFF WBC: CPT

## 2025-06-26 ENCOUNTER — HOSPITAL ENCOUNTER (OUTPATIENT)
Dept: RADIATION ONCOLOGY | Age: 75
Setting detail: RECURRING SERIES
Discharge: HOME | End: 2025-06-29
Payer: MEDICARE

## 2025-06-26 ENCOUNTER — OFFICE VISIT (OUTPATIENT)
Dept: ONCOLOGY | Age: 75
End: 2025-06-26
Payer: MEDICARE

## 2025-06-26 VITALS
TEMPERATURE: 97.5 F | DIASTOLIC BLOOD PRESSURE: 63 MMHG | RESPIRATION RATE: 17 BRPM | WEIGHT: 224.5 LBS | HEART RATE: 90 BPM | OXYGEN SATURATION: 90 % | BODY MASS INDEX: 37.4 KG/M2 | HEIGHT: 65 IN | SYSTOLIC BLOOD PRESSURE: 114 MMHG

## 2025-06-26 DIAGNOSIS — C34.92 NON-SMALL CELL CANCER OF LEFT LUNG (HCC): Primary | ICD-10-CM

## 2025-06-26 DIAGNOSIS — R94.8 ABNORMAL POSITRON EMISSION TOMOGRAPHY (PET) SCAN: ICD-10-CM

## 2025-06-26 DIAGNOSIS — E04.1 THYROID NODULE: ICD-10-CM

## 2025-06-26 LAB
RAD ONC ARIA COURSE FIRST TREATMENT DATE: NORMAL
RAD ONC ARIA COURSE ID: NORMAL
RAD ONC ARIA COURSE INTENT: NORMAL
RAD ONC ARIA COURSE LAST TREATMENT DATE: NORMAL
RAD ONC ARIA COURSE SESSION NUMBER: 4
RAD ONC ARIA COURSE START DATE: NORMAL
RAD ONC ARIA COURSE TREATMENT ELAPSED DAYS: 3
RAD ONC ARIA PLAN FRACTIONS TREATED TO DATE: 4
RAD ONC ARIA PLAN ID: NORMAL
RAD ONC ARIA PLAN PRESCRIBED DOSE PER FRACTION: 10 GY
RAD ONC ARIA PLAN PRIMARY REFERENCE POINT: NORMAL
RAD ONC ARIA PLAN TOTAL FRACTIONS PRESCRIBED: 5
RAD ONC ARIA PLAN TOTAL PRESCRIBED DOSE: 5000 CGY
RAD ONC ARIA REFERENCE POINT DOSAGE GIVEN TO DATE: 40 GY
RAD ONC ARIA REFERENCE POINT ID: NORMAL
RAD ONC ARIA REFERENCE POINT SESSION DOSAGE GIVEN: 10 GY

## 2025-06-26 PROCEDURE — 3078F DIAST BP <80 MM HG: CPT | Performed by: STUDENT IN AN ORGANIZED HEALTH CARE EDUCATION/TRAINING PROGRAM

## 2025-06-26 PROCEDURE — 99417 PROLNG OP E/M EACH 15 MIN: CPT | Performed by: STUDENT IN AN ORGANIZED HEALTH CARE EDUCATION/TRAINING PROGRAM

## 2025-06-26 PROCEDURE — G8428 CUR MEDS NOT DOCUMENT: HCPCS | Performed by: STUDENT IN AN ORGANIZED HEALTH CARE EDUCATION/TRAINING PROGRAM

## 2025-06-26 PROCEDURE — G2211 COMPLEX E/M VISIT ADD ON: HCPCS | Performed by: STUDENT IN AN ORGANIZED HEALTH CARE EDUCATION/TRAINING PROGRAM

## 2025-06-26 PROCEDURE — 99215 OFFICE O/P EST HI 40 MIN: CPT | Performed by: STUDENT IN AN ORGANIZED HEALTH CARE EDUCATION/TRAINING PROGRAM

## 2025-06-26 PROCEDURE — 77373 STRTCTC BDY RAD THER TX DLVR: CPT

## 2025-06-26 PROCEDURE — G8399 PT W/DXA RESULTS DOCUMENT: HCPCS | Performed by: STUDENT IN AN ORGANIZED HEALTH CARE EDUCATION/TRAINING PROGRAM

## 2025-06-26 PROCEDURE — 3017F COLORECTAL CA SCREEN DOC REV: CPT | Performed by: STUDENT IN AN ORGANIZED HEALTH CARE EDUCATION/TRAINING PROGRAM

## 2025-06-26 PROCEDURE — 1123F ACP DISCUSS/DSCN MKR DOCD: CPT | Performed by: STUDENT IN AN ORGANIZED HEALTH CARE EDUCATION/TRAINING PROGRAM

## 2025-06-26 PROCEDURE — 4004F PT TOBACCO SCREEN RCVD TLK: CPT | Performed by: STUDENT IN AN ORGANIZED HEALTH CARE EDUCATION/TRAINING PROGRAM

## 2025-06-26 PROCEDURE — 1090F PRES/ABSN URINE INCON ASSESS: CPT | Performed by: STUDENT IN AN ORGANIZED HEALTH CARE EDUCATION/TRAINING PROGRAM

## 2025-06-26 PROCEDURE — 3074F SYST BP LT 130 MM HG: CPT | Performed by: STUDENT IN AN ORGANIZED HEALTH CARE EDUCATION/TRAINING PROGRAM

## 2025-06-26 PROCEDURE — G8417 CALC BMI ABV UP PARAM F/U: HCPCS | Performed by: STUDENT IN AN ORGANIZED HEALTH CARE EDUCATION/TRAINING PROGRAM

## 2025-06-26 PROCEDURE — 1125F AMNT PAIN NOTED PAIN PRSNT: CPT | Performed by: STUDENT IN AN ORGANIZED HEALTH CARE EDUCATION/TRAINING PROGRAM

## 2025-06-26 ASSESSMENT — PATIENT HEALTH QUESTIONNAIRE - PHQ9
SUM OF ALL RESPONSES TO PHQ QUESTIONS 1-9: 1
SUM OF ALL RESPONSES TO PHQ QUESTIONS 1-9: 1
1. LITTLE INTEREST OR PLEASURE IN DOING THINGS: NOT AT ALL
SUM OF ALL RESPONSES TO PHQ QUESTIONS 1-9: 1
SUM OF ALL RESPONSES TO PHQ QUESTIONS 1-9: 1
2. FEELING DOWN, DEPRESSED OR HOPELESS: SEVERAL DAYS

## 2025-06-26 NOTE — PROGRESS NOTES
any questions or concerns prior to next visit.     In all, I spent  65 minutes in the care of Rafia Donnelly  today, over 50% of which was in direct counseling and coordination of care.      Elements of this note have been dictated using speech recognition software. As a result, errors of speech recognition may have occurred.           Freda Jenkins MD  [facility]  [address] [place] [address]  Office : [phone]  Fax : [phone]

## 2025-06-26 NOTE — PATIENT INSTRUCTIONS
Ref Range Status    Course ID 06/24/2025 C1   Final    Course Intent 06/24/2025 Curative   Final    Course Start Date 06/24/2025 6/10/2025  4:03 PM   Final    Session Number 06/24/2025 2   Final    Course First Treatment Date 06/24/2025 6/23/2025  2:02 PM   Final    Course Last Treatment Date 06/24/2025 6/24/2025  2:55 PM   Final    Course Elapsed Days 06/24/2025 1   Final    Reference Point ID 06/24/2025 L Lung SBRT   Final    Reference Point Dosage Given to Da* 06/24/2025 20  Gy Final    Reference Point Session Dosage Giv* 06/24/2025 10  Gy Final    Plan ID 06/24/2025 L Lung SBRT   Final    Plan Fractions Treated to Date 06/24/2025 2   Final    Plan Total Fractions Prescribed 06/24/2025 5   Final    Plan Prescribed Dose Per Fraction 06/24/2025 10  Gy Final    Plan Total Prescribed Dose 06/24/2025 5,000  cGy Final    Plan Primary Reference Point 06/24/2025 L Lung SBRT   Final       Please refer to After Visit Summary or [employer] for upcoming appointment information. Please call our office for rescheduling needs at least 24 hours before your scheduled appointment time.If you have any questions regarding your upcoming schedule please reach out to your care team through [employer] or call [phone].     Please notify your assigned Nurse Navigator of any unplanned hospital admissions or Emergency Room visits within 24 hours of discharge.    -------------------------------------------------------------------------------------------------------------------  Please call our office at [phone] if you have any  of the following symptoms:   Fever of 100.5 or greater  Chills  Shortness of breath  Swelling or pain in one leg    After office hours an answering service is available and will contact a provider for emergencies or if you are experiencing any of the above symptoms.    ZEE WALESKA, RN

## 2025-06-27 ENCOUNTER — TELEPHONE (OUTPATIENT)
Dept: ONCOLOGY | Age: 75
End: 2025-06-27

## 2025-06-27 ENCOUNTER — HOSPITAL ENCOUNTER (OUTPATIENT)
Dept: RADIATION ONCOLOGY | Age: 75
Setting detail: RECURRING SERIES
Discharge: HOME | End: 2025-06-30
Payer: MEDICARE

## 2025-06-27 LAB
RAD ONC ARIA COURSE FIRST TREATMENT DATE: NORMAL
RAD ONC ARIA COURSE ID: NORMAL
RAD ONC ARIA COURSE INTENT: NORMAL
RAD ONC ARIA COURSE LAST TREATMENT DATE: NORMAL
RAD ONC ARIA COURSE SESSION NUMBER: 5
RAD ONC ARIA COURSE START DATE: NORMAL
RAD ONC ARIA COURSE TREATMENT ELAPSED DAYS: 4
RAD ONC ARIA PLAN FRACTIONS TREATED TO DATE: 5
RAD ONC ARIA PLAN ID: NORMAL
RAD ONC ARIA PLAN PRESCRIBED DOSE PER FRACTION: 10 GY
RAD ONC ARIA PLAN PRIMARY REFERENCE POINT: NORMAL
RAD ONC ARIA PLAN TOTAL FRACTIONS PRESCRIBED: 5
RAD ONC ARIA PLAN TOTAL PRESCRIBED DOSE: 5000 CGY
RAD ONC ARIA REFERENCE POINT DOSAGE GIVEN TO DATE: 50 GY
RAD ONC ARIA REFERENCE POINT ID: NORMAL
RAD ONC ARIA REFERENCE POINT SESSION DOSAGE GIVEN: 10 GY

## 2025-06-27 PROCEDURE — 77373 STRTCTC BDY RAD THER TX DLVR: CPT

## 2025-06-27 PROCEDURE — 77336 RADIATION PHYSICS CONSULT: CPT

## 2025-06-27 NOTE — TELEPHONE ENCOUNTER
Notified patient that Dr. Jenkins referred her to ENT. She was aware but missed their calls. Patient said she will call ENT on Monday.

## 2025-06-27 NOTE — ON TREATMENT VISIT
[facility] RADIATION ONCOLOGY ON TREATMENT VISIT    Patient: Rafia Donnelly MRN: [id number]  SSN: [id number]    YOB: 1950  Age: 75 y.o.  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 female who is currently receiving lung SBRT for a LLL NSCLC    Current RT dose: 50/50 Gy in 5/5 fractions.     No concurrent systemic therapy    Subjective:  Week 1: No complaints.     Objective:  There were no vitals filed for this visit.  Pain 0/10    General: Alert and conversant, in NAD  Skin: no changes    Assessment:  Patient is tolerating radiation therapy well with no XRT issues.    Plan:  -Finished with RT as planned.  -RTC per Dr. Tran.   -Treatment images reviewed.  -The patient has a documented plan of care to address pain.  Pain is not present.      Conner Chowdary MD  06/27/25

## 2025-07-01 ENCOUNTER — TELEPHONE (OUTPATIENT)
Dept: PULMONOLOGY | Age: 75
End: 2025-07-01

## 2025-07-01 ENCOUNTER — TELEPHONE (OUTPATIENT)
Dept: ONCOLOGY | Age: 75
End: 2025-07-01

## 2025-07-01 DIAGNOSIS — C34.92 NON-SMALL CELL CANCER OF LEFT LUNG (HCC): Primary | ICD-10-CM

## 2025-07-01 NOTE — TELEPHONE ENCOUNTER
Physician provider: Dr. Jenkins  Reason for today's call (Please detail here patients chief complaint): Question    Last office visit:na  Patient Callback Number: [phone]  Was callback number verified?: Yes  Preferred [facility] (If refill request): [facility]  Veriified that patient confirmed no refills left at pharmacy? :No  Has the patient called the office for this concern within the last 48 hours?:No    Red Word Mentioned  Warm Transfer Phone Call to (Name): na    Patient notified that their information will be routed to the appropriate team for review. Patient is advised that they will receive a phone call from the appropriate department. If awaiting a call from the triage department and symptoms worsen before receiving a call back, the patient has been advised to proceed to the nearest ED.      [phone]  Pls leave a voicemail if pt does not answer  Pt would like to know if Dr. Jenkins would still like to see her even if she hasn't completed the ordered ENT visit by then. ENT visit is scheduled for 7/31

## 2025-07-01 NOTE — TELEPHONE ENCOUNTER
Chart reviewed.   Caris is still pending.   Radiation therapy is completed per Dr. Chowdary note dated 6/27/25.  Follow up with Dr. Jenkins on 7/15/25.  ENT visit is 7/31/25.      Will ask Dr. Jenkins and call patient back.    After we have caris results. thanks     Told patient Dr. Jenkins would like to see patient after Caris testing is back; should be back by 7/15/25 when return visit it. Patient said she feels fine.

## 2025-07-03 ENCOUNTER — TELEMEDICINE (OUTPATIENT)
Dept: ONCOLOGY | Age: 75
End: 2025-07-03

## 2025-07-03 DIAGNOSIS — Z80.3 FAMILY HISTORY OF BREAST CANCER: Primary | ICD-10-CM

## 2025-07-03 DIAGNOSIS — C34.92 NON-SMALL CELL CANCER OF LEFT LUNG (HCC): ICD-10-CM

## 2025-07-05 LAB
CARIS ALK: NEGATIVE
CARIS GENOMIC LOH - EXOME: NORMAL
CARIS HER2/NEU: NEGATIVE
CARIS HLA-A: NORMAL
CARIS HLA-B: NORMAL
CARIS HLA-C: NORMAL
CARIS MSI - EXOME: NORMAL
CARIS PD-L1 (22C3): NEGATIVE
CARIS PD-L1 (SP263): NEGATIVE
CARIS PD-L1 FDA (28-8): NEGATIVE
CARIS PD-L1 FDA(SP142): NEGATIVE
CARIS TMB - EXOME: NORMAL

## 2025-07-07 ENCOUNTER — TELEPHONE (OUTPATIENT)
Dept: PULMONOLOGY | Age: 75
End: 2025-07-07

## 2025-07-07 NOTE — TELEPHONE ENCOUNTER
I spoke with patient about her call. She states that she she wants her concentrator  and keep her small POC and tanks I explain to her that she needs to use the concentrator and not tanks. But I will reach out to [facility] to see what they will say and with her insurance. Mora fuentes

## 2025-07-07 NOTE — TELEPHONE ENCOUNTER
I left a message for patient to let her know that I spoke with babak ([employer]) states that patient will need to keep concentrator in order to keep tanks. Patient owns her Poc. I explain that we will need to test her before we can DC concentrator. Mora fuentes

## 2025-07-11 DIAGNOSIS — J44.9 STAGE 4 VERY SEVERE COPD BY GOLD CLASSIFICATION (HCC): ICD-10-CM

## 2025-07-14 RX ORDER — ALBUTEROL SULFATE 90 UG/1
2 INHALANT RESPIRATORY (INHALATION) EVERY 4 HOURS PRN
Qty: 20.1 G | Refills: 0 | Status: SHIPPED | OUTPATIENT
Start: 2025-07-14

## 2025-07-15 ENCOUNTER — OFFICE VISIT (OUTPATIENT)
Dept: ONCOLOGY | Age: 75
End: 2025-07-15
Payer: MEDICARE

## 2025-07-15 ENCOUNTER — HOSPITAL ENCOUNTER (OUTPATIENT)
Dept: LAB | Age: 75
Discharge: HOME | End: 2025-07-15
Payer: MEDICARE

## 2025-07-15 VITALS
HEART RATE: 94 BPM | TEMPERATURE: 98 F | RESPIRATION RATE: 19 BRPM | WEIGHT: 224.7 LBS | HEIGHT: 65 IN | DIASTOLIC BLOOD PRESSURE: 74 MMHG | OXYGEN SATURATION: 90 % | BODY MASS INDEX: 37.44 KG/M2 | SYSTOLIC BLOOD PRESSURE: 127 MMHG

## 2025-07-15 DIAGNOSIS — E04.1 THYROID NODULE: ICD-10-CM

## 2025-07-15 DIAGNOSIS — Z80.3 FAMILY HISTORY OF BREAST CANCER: ICD-10-CM

## 2025-07-15 DIAGNOSIS — C34.92 NON-SMALL CELL CANCER OF LEFT LUNG (HCC): ICD-10-CM

## 2025-07-15 DIAGNOSIS — C34.92 NON-SMALL CELL CANCER OF LEFT LUNG (HCC): Primary | ICD-10-CM

## 2025-07-15 LAB
COLLECTION INFORMATION: NORMAL
DATE SENT TO REF LAB: NORMAL
Lab: NORMAL

## 2025-07-15 PROCEDURE — G8399 PT W/DXA RESULTS DOCUMENT: HCPCS | Performed by: STUDENT IN AN ORGANIZED HEALTH CARE EDUCATION/TRAINING PROGRAM

## 2025-07-15 PROCEDURE — G8417 CALC BMI ABV UP PARAM F/U: HCPCS | Performed by: STUDENT IN AN ORGANIZED HEALTH CARE EDUCATION/TRAINING PROGRAM

## 2025-07-15 PROCEDURE — G8428 CUR MEDS NOT DOCUMENT: HCPCS | Performed by: STUDENT IN AN ORGANIZED HEALTH CARE EDUCATION/TRAINING PROGRAM

## 2025-07-15 PROCEDURE — 1090F PRES/ABSN URINE INCON ASSESS: CPT | Performed by: STUDENT IN AN ORGANIZED HEALTH CARE EDUCATION/TRAINING PROGRAM

## 2025-07-15 PROCEDURE — 3078F DIAST BP <80 MM HG: CPT | Performed by: STUDENT IN AN ORGANIZED HEALTH CARE EDUCATION/TRAINING PROGRAM

## 2025-07-15 PROCEDURE — 3074F SYST BP LT 130 MM HG: CPT | Performed by: STUDENT IN AN ORGANIZED HEALTH CARE EDUCATION/TRAINING PROGRAM

## 2025-07-15 PROCEDURE — 1123F ACP DISCUSS/DSCN MKR DOCD: CPT | Performed by: STUDENT IN AN ORGANIZED HEALTH CARE EDUCATION/TRAINING PROGRAM

## 2025-07-15 PROCEDURE — 36415 COLL VENOUS BLD VENIPUNCTURE: CPT

## 2025-07-15 PROCEDURE — 4004F PT TOBACCO SCREEN RCVD TLK: CPT | Performed by: STUDENT IN AN ORGANIZED HEALTH CARE EDUCATION/TRAINING PROGRAM

## 2025-07-15 PROCEDURE — G2211 COMPLEX E/M VISIT ADD ON: HCPCS | Performed by: STUDENT IN AN ORGANIZED HEALTH CARE EDUCATION/TRAINING PROGRAM

## 2025-07-15 PROCEDURE — 3017F COLORECTAL CA SCREEN DOC REV: CPT | Performed by: STUDENT IN AN ORGANIZED HEALTH CARE EDUCATION/TRAINING PROGRAM

## 2025-07-15 PROCEDURE — 99215 OFFICE O/P EST HI 40 MIN: CPT | Performed by: STUDENT IN AN ORGANIZED HEALTH CARE EDUCATION/TRAINING PROGRAM

## 2025-07-15 PROCEDURE — 1125F AMNT PAIN NOTED PAIN PRSNT: CPT | Performed by: STUDENT IN AN ORGANIZED HEALTH CARE EDUCATION/TRAINING PROGRAM

## 2025-07-15 ASSESSMENT — PATIENT HEALTH QUESTIONNAIRE - PHQ9
SUM OF ALL RESPONSES TO PHQ QUESTIONS 1-9: 0
SUM OF ALL RESPONSES TO PHQ QUESTIONS 1-9: 0
2. FEELING DOWN, DEPRESSED OR HOPELESS: NOT AT ALL
SUM OF ALL RESPONSES TO PHQ QUESTIONS 1-9: 0
1. LITTLE INTEREST OR PLEASURE IN DOING THINGS: NOT AT ALL
SUM OF ALL RESPONSES TO PHQ QUESTIONS 1-9: 0

## 2025-07-15 NOTE — PATIENT INSTRUCTIONS
Patient Information from Today's Visit    The members of your [facility] are listed below:    Physician Provider: Radha Jenkins MD  Medical Oncologist  Advanced Practice Clinician: KYARA Temple  Registered Nurse: Hortencia ANDRADE RN  Navigator: N/A  Medical Assistant: KILEY Golden  : Fito  Supportive Care Services: Karla RINCON LMSW    Diagnosis:     Lung    Follow Up Instructions:     You will have labs drawn after today's visit.   Follow up as scheduled.       Treatment Summary has been discussed and given to patient:  N/A      Please refer to After Visit Summary or MyChart for upcoming appointment information. Please call our office for rescheduling needs at least 24 hours before your scheduled appointment time.If you have any questions regarding your upcoming schedule please reach out to your care team through [facility] or call [phone].     Please notify your assigned Nurse Navigator of any unplanned hospital admissions or Emergency Room visits within 24 hours of discharge.    -------------------------------------------------------------------------------------------------------------------  Please call our office at [phone] if you have any  of the following symptoms:   Fever of 100.5 or greater  Chills  Shortness of breath  Swelling or pain in one leg    After office hours an answering service is available and will contact a provider for emergencies or if you are experiencing any of the above symptoms.    ROGELIO BRUCE RN

## 2025-07-15 NOTE — PROGRESS NOTES
[facility]: Office Visit Established Patient    Chief Complaint:    Chief Complaint   Patient presents with    Follow-up     Oncology Diagnosis:  Stage IB T2a, N0, M0 adenocarcinoma left lung    Ngs - no targetable mutation.      Oncology treatment:  SBRT 50/50 Gy in 5/5 fractions in 6/25.       History of Present Illness:  Ms. Donnelly is a 75 y.o. female who was referred to me for evaluation of adenocarcinoma of the lung.    Patient says she is doing okay.  Has chronic pain which is12 out of 10 from her rheumatoid arthritis for which she is on Enbrel.  Chronic cough.  Denies worsening of cough or dyspnea.  Denies weight loss.  Has a lot of chronic headaches which are throbbing in nature.  Quality of headaches has not changed.    Smoked for 60 years from 1 pack to 3 packs a day intermittently.  Quit in April 2025.  Quit drinking 23 years ago used to drink daily large amount for 10 years.  Used to smoke.  Quit 23 years ago.    Family history significant for colon cancer in maternal grandfather.  Mom and sister had breast cancer.  Maternal aunt had  cancer and many cousins had cancer.      Workup:  3/17/2025-CT lung screen  Slowly enlarging groundglass lesion in the posterior left lung base,  3.5 cm in greatest dimension.  While this may represent scarring, noninvasive tumor is not excluded. Suggest further evaluation with PET scan or follow-up CT in 3-6 months.        3/26/2025-PET scan  Area of pleural-based peribronchial consolidation in the lower lobe of the  left lung demonstrating FDG activity, inflammation/infection versus neoplasm.  The total volume of tissue demonstrating FDG activity may be smaller than 8 mm, precluding definitive characterization by FDG activity.   Hypermetabolic left level 4 cervical lymph node consistent with neoplastic  disease.  Other findings as noted.    4/10/25- Robotic Bronchoscopy of LLL semisolid/GGO w/ staging EBUS      Due to a latex allergy a balloon

## 2025-07-22 ENCOUNTER — CLINICAL DOCUMENTATION (OUTPATIENT)
Dept: ONCOLOGY | Age: 75
End: 2025-07-22

## 2025-07-29 ENCOUNTER — CLINICAL DOCUMENTATION (OUTPATIENT)
Dept: ONCOLOGY | Age: 75
End: 2025-07-29

## 2025-07-30 ENCOUNTER — CLINICAL DOCUMENTATION (OUTPATIENT)
Dept: ONCOLOGY | Age: 75
End: 2025-07-30

## 2025-07-31 ENCOUNTER — OFFICE VISIT (OUTPATIENT)
Dept: ENT CLINIC | Age: 75
End: 2025-07-31
Payer: MEDICARE

## 2025-07-31 ENCOUNTER — CLINICAL DOCUMENTATION (OUTPATIENT)
Dept: ONCOLOGY | Age: 75
End: 2025-07-31

## 2025-07-31 VITALS
SYSTOLIC BLOOD PRESSURE: 122 MMHG | HEIGHT: 65 IN | BODY MASS INDEX: 37.49 KG/M2 | WEIGHT: 225 LBS | DIASTOLIC BLOOD PRESSURE: 72 MMHG

## 2025-07-31 DIAGNOSIS — E04.1 THYROID NODULE: Primary | ICD-10-CM

## 2025-07-31 PROCEDURE — 3078F DIAST BP <80 MM HG: CPT | Performed by: OTOLARYNGOLOGY

## 2025-07-31 PROCEDURE — 1159F MED LIST DOCD IN RCRD: CPT | Performed by: OTOLARYNGOLOGY

## 2025-07-31 PROCEDURE — 1123F ACP DISCUSS/DSCN MKR DOCD: CPT | Performed by: OTOLARYNGOLOGY

## 2025-07-31 PROCEDURE — 99213 OFFICE O/P EST LOW 20 MIN: CPT | Performed by: OTOLARYNGOLOGY

## 2025-07-31 PROCEDURE — G8427 DOCREV CUR MEDS BY ELIG CLIN: HCPCS | Performed by: OTOLARYNGOLOGY

## 2025-07-31 PROCEDURE — 4004F PT TOBACCO SCREEN RCVD TLK: CPT | Performed by: OTOLARYNGOLOGY

## 2025-07-31 PROCEDURE — 1090F PRES/ABSN URINE INCON ASSESS: CPT | Performed by: OTOLARYNGOLOGY

## 2025-07-31 PROCEDURE — G8399 PT W/DXA RESULTS DOCUMENT: HCPCS | Performed by: OTOLARYNGOLOGY

## 2025-07-31 PROCEDURE — 3017F COLORECTAL CA SCREEN DOC REV: CPT | Performed by: OTOLARYNGOLOGY

## 2025-07-31 PROCEDURE — 3074F SYST BP LT 130 MM HG: CPT | Performed by: OTOLARYNGOLOGY

## 2025-07-31 PROCEDURE — G8417 CALC BMI ABV UP PARAM F/U: HCPCS | Performed by: OTOLARYNGOLOGY

## 2025-07-31 ASSESSMENT — ENCOUNTER SYMPTOMS
RESPIRATORY NEGATIVE: 1
ALLERGIC/IMMUNOLOGIC NEGATIVE: 1
GASTROINTESTINAL NEGATIVE: 1
EYES NEGATIVE: 1

## 2025-07-31 NOTE — PROGRESS NOTES
CancerNext-Expanded+RNAinsight panel.                           Results showed no clinically actionable findings.  CC:   Rafia Jenkins MD   Chart

## 2025-07-31 NOTE — PROGRESS NOTES
Other Reaction(s): Unknown-Unspecified    Sodium Benzoate Itching    Sulfa Antibiotics Itching     Nausea and itching    Other Reaction(s): GI intolerance    VOMITING    Sulfamethoxazole-Trimethoprim      Other Reaction(s): Not available    sulfamethoxazole / trimethoprim     Patient Active Problem List   Diagnosis    GERD (gastroesophageal reflux disease)    Golfer's elbow, left    Family history of breast cancer    Cholecystitis    COPD, very severe (HCC)    Major depressive disorder    Essential hypertension    Polycythemia    Cubital tunnel syndrome on left    Class 1 obesity due to excess calories in adult    Obesity    Chronic fatigue    Cigarette nicotine dependence without complication    Adenopathy    Pulmonary nodule    Non-small cell cancer of left lung (HCC)    Thyroid nodule    Abnormal positron emission tomography (PET) scan    Multiple thyroid nodules     Current Outpatient Medications   Medication Sig    albuterol sulfate HFA (PROVENTIL;VENTOLIN;PROAIR) 108 (90 Base) MCG/ACT inhaler Inhale 2 puffs into the lungs every 4 hours as needed for Wheezing    B Complex Vitamins (VITAMIN B-COMPLEX) TABS Take 1 tablet by mouth daily    azithromycin (ZITHROMAX) 250 MG tablet TAKE ONE TABLET BY MOUTH 3 TIMES A WEEK    albuterol (PROVENTIL) (2.5 MG/3ML) 0.083% nebulizer solution Take 3 mLs by nebulization every 4 hours as needed for Wheezing Dx code j44.9    fluticasone-umeclidin-vilant (TRELEGY ELLIPTA) 100-62.5-25 MCG/ACT AEPB inhaler Inhale 1 puff into the lungs daily Dx code j44.9    varenicline (CHANTIX) 1 MG tablet Take 1 tablet by mouth 2 times daily    venlafaxine (EFFEXOR) 75 MG tablet Take 1 tablet by mouth daily    OXYGEN 4 lpm during sleep and exertion.    aspirin 81 MG EC tablet Take by mouth daily    vitamin D3 (CHOLECALCIFEROL) 125 MCG (5000 UT) TABS tablet Take by mouth daily    pantoprazole (PROTONIX) 40 MG tablet Take 1 tablet by mouth daily    Etanercept 50 MG/ML SOAJ Inject 50 mg into the skin

## 2025-08-11 DIAGNOSIS — C34.92 NON-SMALL CELL CANCER OF LEFT LUNG (HCC): Primary | ICD-10-CM

## 2025-08-12 ENCOUNTER — HOSPITAL ENCOUNTER (OUTPATIENT)
Dept: LAB | Age: 75
Discharge: HOME | End: 2025-08-12
Payer: MEDICARE

## 2025-08-12 ENCOUNTER — OFFICE VISIT (OUTPATIENT)
Dept: ONCOLOGY | Age: 75
End: 2025-08-12
Payer: MEDICARE

## 2025-08-12 VITALS
RESPIRATION RATE: 18 BRPM | SYSTOLIC BLOOD PRESSURE: 144 MMHG | TEMPERATURE: 97.4 F | DIASTOLIC BLOOD PRESSURE: 72 MMHG | HEIGHT: 65 IN | BODY MASS INDEX: 37.59 KG/M2 | OXYGEN SATURATION: 91 % | HEART RATE: 83 BPM | WEIGHT: 225.6 LBS

## 2025-08-12 DIAGNOSIS — Z80.3 FAMILY HISTORY OF BREAST CANCER: ICD-10-CM

## 2025-08-12 DIAGNOSIS — C34.92 NON-SMALL CELL CANCER OF LEFT LUNG (HCC): Primary | ICD-10-CM

## 2025-08-12 DIAGNOSIS — R94.8 ABNORMAL POSITRON EMISSION TOMOGRAPHY (PET) SCAN: ICD-10-CM

## 2025-08-12 DIAGNOSIS — E04.1 THYROID NODULE: ICD-10-CM

## 2025-08-12 DIAGNOSIS — C34.92 NON-SMALL CELL CANCER OF LEFT LUNG (HCC): ICD-10-CM

## 2025-08-12 LAB
ALBUMIN SERPL-MCNC: 3.9 G/DL (ref 3.2–4.6)
ALBUMIN/GLOB SERPL: 1 (ref 1–1.9)
ALP SERPL-CCNC: 87 U/L (ref 35–104)
ALT SERPL-CCNC: 22 U/L (ref 8–45)
ANION GAP SERPL CALC-SCNC: 12 MMOL/L (ref 7–16)
AST SERPL-CCNC: 21 U/L (ref 15–37)
BASOPHILS # BLD: 0.03 K/UL (ref 0–0.2)
BASOPHILS NFR BLD: 0.5 % (ref 0–2)
BILIRUB SERPL-MCNC: 0.5 MG/DL (ref 0–1.2)
BUN SERPL-MCNC: 14 MG/DL (ref 8–23)
CALCIUM SERPL-MCNC: 9.9 MG/DL (ref 8.8–10.2)
CHLORIDE SERPL-SCNC: 100 MMOL/L (ref 98–107)
CO2 SERPL-SCNC: 27 MMOL/L (ref 20–29)
CREAT SERPL-MCNC: 0.93 MG/DL (ref 0.6–1.1)
DIFFERENTIAL METHOD BLD: ABNORMAL
EOSINOPHIL # BLD: 0.1 K/UL (ref 0–0.8)
EOSINOPHIL NFR BLD: 1.6 % (ref 0.5–7.8)
ERYTHROCYTE [DISTWIDTH] IN BLOOD BY AUTOMATED COUNT: 13.8 % (ref 11.9–14.6)
GLOBULIN SER CALC-MCNC: 3.8 G/DL (ref 2.3–3.5)
GLUCOSE SERPL-MCNC: 112 MG/DL (ref 70–99)
HCT VFR BLD AUTO: 44.5 % (ref 35.8–46.3)
HGB BLD-MCNC: 14.2 G/DL (ref 11.7–15.4)
IMM GRANULOCYTES # BLD AUTO: 0.02 K/UL (ref 0–0.5)
IMM GRANULOCYTES NFR BLD AUTO: 0.3 % (ref 0–5)
LYMPHOCYTES # BLD: 0.84 K/UL (ref 0.5–4.6)
LYMPHOCYTES NFR BLD: 13.7 % (ref 13–44)
MCH RBC QN AUTO: 28.1 PG (ref 26.1–32.9)
MCHC RBC AUTO-ENTMCNC: 31.9 G/DL (ref 31.4–35)
MCV RBC AUTO: 88.1 FL (ref 82–102)
MONOCYTES # BLD: 0.78 K/UL (ref 0.1–1.3)
MONOCYTES NFR BLD: 12.7 % (ref 4–12)
NEUTS SEG # BLD: 4.35 K/UL (ref 1.7–8.2)
NEUTS SEG NFR BLD: 71.2 % (ref 43–78)
NRBC # BLD: 0 K/UL (ref 0–0.2)
PLATELET # BLD AUTO: 256 K/UL (ref 150–450)
PMV BLD AUTO: 8.9 FL (ref 9.4–12.3)
POTASSIUM SERPL-SCNC: 4.8 MMOL/L (ref 3.5–5.1)
PROT SERPL-MCNC: 7.6 G/DL (ref 6.3–8.2)
RBC # BLD AUTO: 5.05 M/UL (ref 4.05–5.2)
SODIUM SERPL-SCNC: 139 MMOL/L (ref 136–145)
WBC # BLD AUTO: 6.1 K/UL (ref 4.3–11.1)

## 2025-08-12 PROCEDURE — G8399 PT W/DXA RESULTS DOCUMENT: HCPCS | Performed by: STUDENT IN AN ORGANIZED HEALTH CARE EDUCATION/TRAINING PROGRAM

## 2025-08-12 PROCEDURE — 1090F PRES/ABSN URINE INCON ASSESS: CPT | Performed by: STUDENT IN AN ORGANIZED HEALTH CARE EDUCATION/TRAINING PROGRAM

## 2025-08-12 PROCEDURE — 3017F COLORECTAL CA SCREEN DOC REV: CPT | Performed by: STUDENT IN AN ORGANIZED HEALTH CARE EDUCATION/TRAINING PROGRAM

## 2025-08-12 PROCEDURE — 99214 OFFICE O/P EST MOD 30 MIN: CPT | Performed by: STUDENT IN AN ORGANIZED HEALTH CARE EDUCATION/TRAINING PROGRAM

## 2025-08-12 PROCEDURE — 1159F MED LIST DOCD IN RCRD: CPT | Performed by: STUDENT IN AN ORGANIZED HEALTH CARE EDUCATION/TRAINING PROGRAM

## 2025-08-12 PROCEDURE — 4004F PT TOBACCO SCREEN RCVD TLK: CPT | Performed by: STUDENT IN AN ORGANIZED HEALTH CARE EDUCATION/TRAINING PROGRAM

## 2025-08-12 PROCEDURE — 1123F ACP DISCUSS/DSCN MKR DOCD: CPT | Performed by: STUDENT IN AN ORGANIZED HEALTH CARE EDUCATION/TRAINING PROGRAM

## 2025-08-12 PROCEDURE — 36415 COLL VENOUS BLD VENIPUNCTURE: CPT

## 2025-08-12 PROCEDURE — G8427 DOCREV CUR MEDS BY ELIG CLIN: HCPCS | Performed by: STUDENT IN AN ORGANIZED HEALTH CARE EDUCATION/TRAINING PROGRAM

## 2025-08-12 PROCEDURE — 1125F AMNT PAIN NOTED PAIN PRSNT: CPT | Performed by: STUDENT IN AN ORGANIZED HEALTH CARE EDUCATION/TRAINING PROGRAM

## 2025-08-12 PROCEDURE — 3078F DIAST BP <80 MM HG: CPT | Performed by: STUDENT IN AN ORGANIZED HEALTH CARE EDUCATION/TRAINING PROGRAM

## 2025-08-12 PROCEDURE — 3077F SYST BP >= 140 MM HG: CPT | Performed by: STUDENT IN AN ORGANIZED HEALTH CARE EDUCATION/TRAINING PROGRAM

## 2025-08-12 PROCEDURE — 80053 COMPREHEN METABOLIC PANEL: CPT

## 2025-08-12 PROCEDURE — 85025 COMPLETE CBC W/AUTO DIFF WBC: CPT

## 2025-08-12 PROCEDURE — G8417 CALC BMI ABV UP PARAM F/U: HCPCS | Performed by: STUDENT IN AN ORGANIZED HEALTH CARE EDUCATION/TRAINING PROGRAM

## (undated) DEVICE — KIT SURG CUST BRONCHSCP CUST SFD

## (undated) DEVICE — TROCAR: Brand: KII® SLEEVE

## (undated) DEVICE — SUTURE SZ 0 27IN 5/8 CIR UR-6  TAPER PT VIOLET ABSRB VICRYL J603H

## (undated) DEVICE — NEEDLE ASPIR 21GA L700MM US GUID TREAT DST END FOR EFFICIENT

## (undated) DEVICE — KIT,ANTI FOG,W/SPONGE & FLUID,SOFT PACK: Brand: MEDLINE

## (undated) DEVICE — CONTAINER SPEC FRMLN 120ML --

## (undated) DEVICE — FORCEPS BX L115CM ALGTR JAW STP DISP

## (undated) DEVICE — GENERAL LAPAROSCOPY: Brand: MEDLINE INDUSTRIES, INC.

## (undated) DEVICE — SOLUTION IV 1000ML 0.9% SOD CHL

## (undated) DEVICE — [HIGH FLOW INSUFFLATOR,  DO NOT USE IF PACKAGE IS DAMAGED,  KEEP DRY,  KEEP AWAY FROM SUNLIGHT,  PROTECT FROM HEAT AND RADIOACTIVE SOURCES.]: Brand: PNEUMOSURE

## (undated) DEVICE — MOUTHPIECE ENDOSCP L CTRL OPN AND SIDE PORTS DISP

## (undated) DEVICE — SUTURE MCRYL SZ 4-0 L27IN ABSRB UD L19MM PS-2 1/2 CIR PRIM Y426H

## (undated) DEVICE — LOGICUT SCISSOR LENGTH 320MM: Brand: LOGI - LAPAROSCOPIC INSTRUMENT SYSTEM

## (undated) DEVICE — SINGLE USE SUCTION VALVE MAJ-209: Brand: SINGLE USE SUCTION VALVE (STERILE)

## (undated) DEVICE — DRAPE TWL SURG 16X26IN BLU ORB04] ALLCARE INC]

## (undated) DEVICE — SYR 50ML LR LCK 1ML GRAD NSAF --

## (undated) DEVICE — DERMABOND SKIN ADH 0.7ML -- DERMABOND ADVANCED 12/BX

## (undated) DEVICE — LAPAROSCOPIC TROCAR SLEEVE/SINGLE USE: Brand: KII® OPTICAL ACCESS SYSTEM

## (undated) DEVICE — REM POLYHESIVE ADULT PATIENT RETURN ELECTRODE: Brand: VALLEYLAB

## (undated) DEVICE — CLIP APPLIER WITH CLIP LOGIC TECHNOLOGY: Brand: ENDO CLIP III

## (undated) DEVICE — BUTTON SWITCH PENCIL BLADE ELECTRODE, HOLSTER: Brand: EDGE

## (undated) DEVICE — TROCAR: Brand: KII FIOS FIRST ENTRY

## (undated) DEVICE — NEEDLE HYPO 21GA L1.5IN INTRAMUSCULAR S STL LATCH BVL UP

## (undated) DEVICE — TROCARS: Brand: KII® BLUNT TIP ACCESS SYSTEM

## (undated) DEVICE — (D)PREP SKN CHLRAPRP APPL 26ML -- CONVERT TO ITEM 371833

## (undated) DEVICE — SINGLE USE BIOPSY VALVE MAJ-210: Brand: SINGLE USE BIOPSY VALVE (STERILE)